# Patient Record
Sex: FEMALE | Race: WHITE | NOT HISPANIC OR LATINO | Employment: UNEMPLOYED | URBAN - METROPOLITAN AREA
[De-identification: names, ages, dates, MRNs, and addresses within clinical notes are randomized per-mention and may not be internally consistent; named-entity substitution may affect disease eponyms.]

---

## 2017-01-12 ENCOUNTER — OFFICE VISIT (OUTPATIENT)
Dept: FAMILY MEDICINE | Facility: CLINIC | Age: 67
End: 2017-01-12
Payer: COMMERCIAL

## 2017-01-12 VITALS
HEIGHT: 65 IN | DIASTOLIC BLOOD PRESSURE: 70 MMHG | WEIGHT: 158.5 LBS | SYSTOLIC BLOOD PRESSURE: 102 MMHG | BODY MASS INDEX: 26.41 KG/M2 | HEART RATE: 71 BPM | OXYGEN SATURATION: 98 % | TEMPERATURE: 97.9 F

## 2017-01-12 DIAGNOSIS — F41.0 ANXIETY ATTACK: ICD-10-CM

## 2017-01-12 DIAGNOSIS — R07.0 THROAT PAIN: ICD-10-CM

## 2017-01-12 DIAGNOSIS — E03.9 ACQUIRED HYPOTHYROIDISM: ICD-10-CM

## 2017-01-12 DIAGNOSIS — J01.00 ACUTE MAXILLARY SINUSITIS, RECURRENCE NOT SPECIFIED: Primary | ICD-10-CM

## 2017-01-12 LAB
DEPRECATED S PYO AG THROAT QL EIA: NORMAL
MICRO REPORT STATUS: NORMAL
SPECIMEN SOURCE: NORMAL

## 2017-01-12 PROCEDURE — 87081 CULTURE SCREEN ONLY: CPT | Performed by: FAMILY MEDICINE

## 2017-01-12 PROCEDURE — 87880 STREP A ASSAY W/OPTIC: CPT | Performed by: FAMILY MEDICINE

## 2017-01-12 PROCEDURE — 99213 OFFICE O/P EST LOW 20 MIN: CPT | Performed by: FAMILY MEDICINE

## 2017-01-12 RX ORDER — AZITHROMYCIN 250 MG/1
TABLET, FILM COATED ORAL
Qty: 6 TABLET | Refills: 0 | Status: SHIPPED | OUTPATIENT
Start: 2017-01-12 | End: 2017-04-05

## 2017-01-12 RX ORDER — LORAZEPAM 0.5 MG/1
TABLET ORAL
Qty: 20 TABLET | Refills: 0 | Status: SHIPPED | OUTPATIENT
Start: 2017-01-12 | End: 2017-05-02

## 2017-01-12 RX ORDER — LEVOTHYROXINE SODIUM 125 UG/1
TABLET ORAL
Qty: 90 TABLET | Refills: 1 | Status: SHIPPED | OUTPATIENT
Start: 2017-01-12 | End: 2018-01-26

## 2017-01-12 NOTE — PROGRESS NOTES
Subjective: She has had chronic problems with sinuses in the past which have mostly been better off gluten and sugars but with the stress of her recent finalized divorce and poor eating over the holidays she's had more trouble and 4 days ago she got a lot of sinus pressure and right ear pain and sore throat. Mucus is still mostly clear or thick. She is leaving in about a week to go to [facility]. She is worried about being sick there. She also asked for refill of the lorazepam, tends to use it for travel, hasn't filled it for 7 months. I notice she will need some more refills of her thyroid medication to get her till next June when she is due for a physical again.    Objective: ENT is nonspecific. Neck is normal. Lungs are clear. She does have a little pain over the right maxillary sinus    Assessment and plan: Sinusitis, relatively mild, could watch it for a few days and see if it gets better on its own and I wrote out a prescription for her to fill if things are not getting better in the next few days. I refilled the Ativan and sent another 6 months of thyroid.

## 2017-01-12 NOTE — NURSING NOTE
"Chief Complaint   Patient presents with     URI     sx for 4 days, right ear pain,  right side sinus pressure, sore throat      /70 mmHg  Pulse 71  Temp(Src) 97.9  F (36.6  C) (Oral)  Ht 5' 5.25\" (1.657 m)  Wt 158 lb 8 oz (71.895 kg)  BMI 26.19 kg/m2  SpO2 98%  LMP 09/14/2006 Estimated body mass index is 26.19 kg/(m^2) as calculated from the following:    Height as of this encounter: 5' 5.25\" (1.657 m).    Weight as of this encounter: 158 lb 8 oz (71.895 kg).  BP completed using cuff size: regular       Health Maintenance due pending provider review:  PHQ9    PHQ/ACT/KARINA--Gave pt questionnare      Tyrell Castañeda CMA  "

## 2017-01-13 ASSESSMENT — PATIENT HEALTH QUESTIONNAIRE - PHQ9: SUM OF ALL RESPONSES TO PHQ QUESTIONS 1-9: 4

## 2017-01-14 LAB
BACTERIA SPEC CULT: NORMAL
MICRO REPORT STATUS: NORMAL
SPECIMEN SOURCE: NORMAL

## 2017-02-20 ENCOUNTER — RADIANT APPOINTMENT (OUTPATIENT)
Dept: MAMMOGRAPHY | Facility: CLINIC | Age: 67
End: 2017-02-20

## 2017-02-20 ENCOUNTER — RECORDS - HEALTHEAST (OUTPATIENT)
Dept: ADMINISTRATIVE | Facility: OTHER | Age: 67
End: 2017-02-20

## 2017-02-20 DIAGNOSIS — Z12.31 VISIT FOR SCREENING MAMMOGRAM: ICD-10-CM

## 2017-02-21 ENCOUNTER — RECORDS - HEALTHEAST (OUTPATIENT)
Dept: ADMINISTRATIVE | Facility: OTHER | Age: 67
End: 2017-02-21

## 2017-04-05 ENCOUNTER — OFFICE VISIT (OUTPATIENT)
Dept: FAMILY MEDICINE | Facility: CLINIC | Age: 67
End: 2017-04-05
Payer: COMMERCIAL

## 2017-04-05 VITALS
OXYGEN SATURATION: 100 % | RESPIRATION RATE: 15 BRPM | SYSTOLIC BLOOD PRESSURE: 118 MMHG | BODY MASS INDEX: 25.49 KG/M2 | HEIGHT: 65 IN | WEIGHT: 153 LBS | DIASTOLIC BLOOD PRESSURE: 70 MMHG | HEART RATE: 72 BPM | TEMPERATURE: 98.3 F

## 2017-04-05 DIAGNOSIS — R30.0 DYSURIA: Primary | ICD-10-CM

## 2017-04-05 DIAGNOSIS — E03.9 ACQUIRED HYPOTHYROIDISM: ICD-10-CM

## 2017-04-05 LAB
ALBUMIN UR-MCNC: ABNORMAL MG/DL
APPEARANCE UR: CLEAR
BACTERIA #/AREA URNS HPF: ABNORMAL /HPF
BILIRUB UR QL STRIP: NEGATIVE
COLOR UR AUTO: YELLOW
GLUCOSE UR STRIP-MCNC: NEGATIVE MG/DL
HGB UR QL STRIP: ABNORMAL
KETONES UR STRIP-MCNC: NEGATIVE MG/DL
LEUKOCYTE ESTERASE UR QL STRIP: ABNORMAL
NITRATE UR QL: POSITIVE
NON-SQ EPI CELLS #/AREA URNS LPF: ABNORMAL /LPF
PH UR STRIP: 5.5 PH (ref 5–7)
RBC #/AREA URNS AUTO: ABNORMAL /HPF (ref 0–2)
SP GR UR STRIP: 1.01 (ref 1–1.03)
URN SPEC COLLECT METH UR: ABNORMAL
UROBILINOGEN UR STRIP-ACNC: 0.2 EU/DL (ref 0.2–1)
WBC #/AREA URNS AUTO: ABNORMAL /HPF (ref 0–2)

## 2017-04-05 PROCEDURE — 87186 SC STD MICRODIL/AGAR DIL: CPT | Performed by: FAMILY MEDICINE

## 2017-04-05 PROCEDURE — 81001 URINALYSIS AUTO W/SCOPE: CPT | Performed by: FAMILY MEDICINE

## 2017-04-05 PROCEDURE — 84443 ASSAY THYROID STIM HORMONE: CPT | Performed by: FAMILY MEDICINE

## 2017-04-05 PROCEDURE — 99214 OFFICE O/P EST MOD 30 MIN: CPT | Performed by: FAMILY MEDICINE

## 2017-04-05 PROCEDURE — 36415 COLL VENOUS BLD VENIPUNCTURE: CPT | Performed by: FAMILY MEDICINE

## 2017-04-05 PROCEDURE — 87088 URINE BACTERIA CULTURE: CPT | Performed by: FAMILY MEDICINE

## 2017-04-05 PROCEDURE — 87086 URINE CULTURE/COLONY COUNT: CPT | Performed by: FAMILY MEDICINE

## 2017-04-05 RX ORDER — CEPHALEXIN 500 MG/1
500 CAPSULE ORAL 3 TIMES DAILY
Qty: 9 CAPSULE | Refills: 0 | Status: SHIPPED | OUTPATIENT
Start: 2017-04-05 | End: 2017-04-08

## 2017-04-05 NOTE — MR AVS SNAPSHOT
"              After Visit Summary   4/5/2017    Adalgisa Dennis    MRN: [id number]           Patient Information     Date Of Birth          1950        Visit Information        Provider Department      4/5/2017 3:00 PM Daria Holt MD [facility]        Today's Diagnoses     Dysuria    -  1    Acquired hypothyroidism           Follow-ups after your visit        Who to contact     If you have questions or need follow up information about today's clinic visit or your schedule please contact [facility] directly at [phone].  Normal or non-critical lab and imaging results will be communicated to you by [employer], letter or phone within 4 business days after the clinic has received the results. If you do not hear from us within 7 days, please contact the clinic through [employer] or phone. If you have a critical or abnormal lab result, we will notify you by phone as soon as possible.  Submit refill requests through [employer] or call your pharmacy and they will forward the refill request to us. Please allow 3 business days for your refill to be completed.          Additional Information About Your Visit        MyChart Information     [employer] gives you secure access to your electronic health record. If you see a primary care provider, you can also send messages to your care team and make appointments. If you have questions, please call your primary care clinic.  If you do not have a primary care provider, please call [phone] and they will assist you.        Care EveryWhere ID     This is your Care EveryWhere ID. This could be used by other organizations to access your [facility] medical records  [id number]        Your Vitals Were     Pulse Temperature Respirations Height Last Period Pulse Oximetry    72 98.3  F (36.8  C) (Tympanic) 15 5' 5.25\" (1.657 m) 09/14/2006 100%    Breastfeeding? BMI (Body Mass Index)                No 25.27 kg/m2           Blood Pressure from Last 3 " Encounters:   04/05/17 118/70   01/12/17 102/70   06/02/16 118/62    Weight from Last 3 Encounters:   04/05/17 153 lb (69.4 kg)   01/12/17 158 lb 8 oz (71.9 kg)   06/02/16 156 lb 8 oz (71 kg)              We Performed the Following     TSH with free T4 reflex     UA with Microscopic reflex to Culture     Urine Culture Aerobic Bacterial          Today's Medication Changes          These changes are accurate as of: 4/5/17 11:59 PM.  If you have any questions, ask your nurse or doctor.               Start taking these medicines.        Dose/Directions    cephALEXin 500 MG capsule   Commonly known as:  KEFLEX   Used for:  Dysuria   Started by:  Daria Holt MD        Dose:  500 mg   Take 1 capsule (500 mg) by mouth 3 times daily for 3 days   Quantity:  9 capsule   Refills:  0            Where to get your medicines      These medications were sent to [facility] [address] [place] [address] AT [address] [address]     Phone:  [phone]     cephALEXin 500 MG capsule                Primary Care Provider Office Phone # Fax #    Panfilo Solares [phone]       [facility] [address] [address]        Thank you!     Thank you for choosing [facility]  for your care. Our goal is always to provide you with excellent care. Hearing back from our patients is one way we can continue to improve our services. Please take a few minutes to complete the written survey that you may receive in the mail after your visit with us. Thank you!             Your Updated Medication List - Protect others around you: Learn how to safely use, store and throw away your medicines at www.disposemymeds.org.          This list is accurate as of: 4/5/17 11:59 PM.  Always use your most recent med list.                   Brand Name Dispense Instructions for use    ascorbic acid 1000 MG Tabs    vitamin C     Take 1,000 mg by  mouth three times a week.       aspirin 81 MG tablet     100    1 tab po QD (Once per day)       CALCIUM 600 PO          cephALEXin 500 MG capsule    KEFLEX    9 capsule    Take 1 capsule (500 mg) by mouth 3 times daily for 3 days       HOMEOPATHIC PRODUCTS PO      Take  by mouth daily.       * levothyroxine 112 MCG tablet    SYNTHROID/LEVOTHROID    90 tablet    Take 1 tablet (112 mcg) by mouth daily Some days will take .125       * levothyroxine 125 MCG tablet    SYNTHROID/LEVOTHROID    90 tablet    TAKE 1 TABLET BY MOUTH 2 DAYS A WEEK       LORazepam 0.5 MG tablet    ATIVAN    20 tablet    Up to 1 daily as needed for anxiety       propranolol 20 MG tablet    INDERAL    540 tablet    TAKE 1-2 TABLETS BY MOUTH EVERY 8 HOURS       vitamin D 1000 UNITS capsule     30    1 CAPSULE DAILY       * Notice:  This list has 2 medication(s) that are the same as other medications prescribed for you. Read the directions carefully, and ask your doctor or other care provider to review them with you.

## 2017-04-05 NOTE — PROGRESS NOTES
"    SUBJECTIVE:                                                    Adalgisa Dennis is a 67 year old female who presents to clinic today for the following health issues:      URINARY TRACT SYMPTOMS      Duration: 1 day    Description  burning    Intensity:  moderate    Accompanying signs and symptoms:  Fever/chills: no   Flank pain no   Nausea and vomiting: no   Vaginal symptoms: none  Abdominal/Pelvic Pain: no     History  History of frequent UTI's: YES  History of kidney stones: no   Sexually Active: no   Possibility of pregnancy: No    Precipitating or alleviating factors: None    Therapies tried and outcome: Tylenol   Outcome: still having pain       PROBLEMS TO ADD ON...    Problem list and histories reviewed & adjusted, as indicated.  Additional history: as documented    Labs reviewed in EPIC    Reviewed and updated as needed this visit by clinical staff       Reviewed and updated as needed this visit by Provider         ROS:  Constitutional, HEENT, cardiovascular, pulmonary, GI, , musculoskeletal, neuro, skin, endocrine and psych systems are negative, except as otherwise noted.    OBJECTIVE:                                                    /70  Pulse 72  Temp 98.3  F (36.8  C) (Tympanic)  Resp 15  Ht 5' 5.25\" (1.657 m)  Wt 153 lb (69.4 kg)  LMP 09/14/2006  SpO2 100%  Breastfeeding? No  BMI 25.27 kg/m2  Body mass index is 25.27 kg/(m^2).  GENERAL: healthy, alert and no distress  NECK: no adenopathy, no asymmetry, masses, or scars and thyroid normal to palpation  RESP: lungs clear to auscultation - no rales, rhonchi or wheezes  CV: regular rate and rhythm, normal S1 S2, no S3 or S4, no murmur, click or rub, no peripheral edema and peripheral pulses strong  ABDOMEN: soft, nontender, no hepatosplenomegaly, no masses and bowel sounds normal  MS: no gross musculoskeletal defects noted, no edema          ASSESSMENT/PLAN:                                                    (R30.0) Dysuria  " (primary encounter diagnosis)  Plan: UA with Microscopic reflex to Culture, Urine Culture Aerobic Bacterial, encouraged hydration   start cephALEXin (KEFLEX)  500 MG capsule twice daily for 3 days   Potential medication side effects were discussed with the patient; let me know if any occur.  Follow up as needed  If not better      (E03.9) Acquired hypothyroidism  Comment: takes 125mcg M,F  twice a week and 112 rest of the week  Plan: TSH with free T4 reflex  Will refill if needed and TSH in range        The patient indicates understanding of these issues and agrees with the plan.     Daria Holt MD  [facility]

## 2017-04-06 LAB — TSH SERPL DL<=0.005 MIU/L-ACNC: 3.21 MU/L (ref 0.4–4)

## 2017-04-07 LAB
BACTERIA SPEC CULT: ABNORMAL
MICRO REPORT STATUS: ABNORMAL
MICROORGANISM SPEC CULT: ABNORMAL
SPECIMEN SOURCE: ABNORMAL

## 2017-04-08 NOTE — PROGRESS NOTES
-TSH (thyroid stimulating hormone) level is normal which indicates appropriate thyroid replacement dosing.  ADVISE: continuing same replacement dose and recheck in 12 months (TSH w/ T4 reflex, DX: hypothyroidism.)    Please keep us posted if need refill    The urine culture is positive for bacterial that's typical for urinary tract infection    Antibiotic should help. If not please keep us posted

## 2017-05-02 DIAGNOSIS — F41.0 ANXIETY ATTACK: ICD-10-CM

## 2017-05-02 DIAGNOSIS — E03.8 OTHER SPECIFIED HYPOTHYROIDISM: ICD-10-CM

## 2017-05-03 RX ORDER — LEVOTHYROXINE SODIUM 112 UG/1
TABLET ORAL
Qty: 65 TABLET | Refills: 2 | Status: SHIPPED | OUTPATIENT
Start: 2017-05-03 | End: 2017-06-27

## 2017-05-03 RX ORDER — LORAZEPAM 0.5 MG/1
TABLET ORAL
Qty: 20 TABLET | Refills: 0 | Status: SHIPPED | OUTPATIENT
Start: 2017-05-03 | End: 2017-06-27

## 2017-05-03 NOTE — TELEPHONE ENCOUNTER
MP,    Controlled Substance Refill Request for Ativan    Last refill: [facility] website not working this AM    Last clinic visit: 1/12/2017    Clinic visit frequency required: not documented  Next appt: none    Controlled substance agreement on file: No.    Documentation in problem list reviewed:  started, needs to be completed    Processing:  F[facility] Rx to [facility]    RX monitoring program ([place]) reviewed: [facility] website not working this AM  [place]:  [email or web address]    Please authorize if appropriate.  Thanks,  Ana ONEAL RN

## 2017-05-03 NOTE — TELEPHONE ENCOUNTER
Prescription approved per [facility] Refill Protocol.  Ana ONEAL RN

## 2017-05-03 NOTE — TELEPHONE ENCOUNTER
Pending Prescriptions:                       Disp   Refills    levothyroxine (SYNTHROID/LEVOTHROID) 112 *90 tab*2            Sig: TAKE 1 TABLET BY MOUTH ONCE DAILY, 5 DAYS A WEEK       Last Written Prescription Date: 12/15/2015  Last Quantity: 90, # refills: PRN  Last Office Visit with [facility], [facility] or [facility] prescribing provider: 4/5/2017        TSH   Date Value Ref Range Status   04/05/2017 3.21 0.40 - 4.00 mU/L Final

## 2017-05-03 NOTE — TELEPHONE ENCOUNTER
Pending Prescriptions:                       Disp   Refills    LORazepam (ATIVAN) 0.5 MG tablet [Pharmac*20 tab*0            Sig: TAKE 1 TABLET BY MOUTH AS NEEDED FOR ANXIETY        Last Written Prescription Date:  1/12/2017  Last Fill Quantity: 20,   # refills: 0  Last Office Visit with [facility], [facility] or [facility] prescribing provider: 4/5/2017  Future Office visit:       Routing refill request to provider for review/approval because:  Drug not on the [facility], [facility] or [facility] refill protocol or controlled substance

## 2017-05-12 ENCOUNTER — TRANSFERRED RECORDS (OUTPATIENT)
Dept: HEALTH INFORMATION MANAGEMENT | Facility: CLINIC | Age: 67
End: 2017-05-12

## 2017-05-23 ENCOUNTER — COMMUNICATION - HEALTHEAST (OUTPATIENT)
Dept: SCHEDULING | Facility: CLINIC | Age: 67
End: 2017-05-23

## 2017-06-27 ENCOUNTER — OFFICE VISIT (OUTPATIENT)
Dept: FAMILY MEDICINE | Facility: CLINIC | Age: 67
End: 2017-06-27
Payer: COMMERCIAL

## 2017-06-27 VITALS
SYSTOLIC BLOOD PRESSURE: 97 MMHG | TEMPERATURE: 96.2 F | OXYGEN SATURATION: 97 % | BODY MASS INDEX: 25.69 KG/M2 | DIASTOLIC BLOOD PRESSURE: 51 MMHG | HEART RATE: 63 BPM | WEIGHT: 154.2 LBS | HEIGHT: 65 IN

## 2017-06-27 DIAGNOSIS — Z23 NEED FOR VACCINATION: ICD-10-CM

## 2017-06-27 DIAGNOSIS — R82.90 NONSPECIFIC FINDING ON EXAMINATION OF URINE: ICD-10-CM

## 2017-06-27 DIAGNOSIS — F40.243 FLYING PHOBIA: ICD-10-CM

## 2017-06-27 DIAGNOSIS — F41.0 ANXIETY ATTACK: ICD-10-CM

## 2017-06-27 DIAGNOSIS — Z00.00 MEDICARE ANNUAL WELLNESS VISIT, SUBSEQUENT: Primary | ICD-10-CM

## 2017-06-27 DIAGNOSIS — D69.6 THROMBOCYTOPENIA (H): ICD-10-CM

## 2017-06-27 DIAGNOSIS — E03.8 OTHER SPECIFIED HYPOTHYROIDISM: ICD-10-CM

## 2017-06-27 DIAGNOSIS — I48.0 PAROXYSMAL ATRIAL FIBRILLATION (H): ICD-10-CM

## 2017-06-27 DIAGNOSIS — R30.0 DYSURIA: ICD-10-CM

## 2017-06-27 LAB
ALBUMIN UR-MCNC: NEGATIVE MG/DL
APPEARANCE UR: CLEAR
BILIRUB UR QL STRIP: NEGATIVE
COLOR UR AUTO: YELLOW
GLUCOSE UR STRIP-MCNC: NEGATIVE MG/DL
HGB UR QL STRIP: ABNORMAL
KETONES UR STRIP-MCNC: NEGATIVE MG/DL
LEUKOCYTE ESTERASE UR QL STRIP: ABNORMAL
NITRATE UR QL: NEGATIVE
NON-SQ EPI CELLS #/AREA URNS LPF: ABNORMAL /LPF
PH UR STRIP: 5.5 PH (ref 5–7)
RBC #/AREA URNS AUTO: ABNORMAL /HPF (ref 0–2)
SP GR UR STRIP: <=1.005 (ref 1–1.03)
URN SPEC COLLECT METH UR: ABNORMAL
UROBILINOGEN UR STRIP-ACNC: 0.2 EU/DL (ref 0.2–1)
WBC #/AREA URNS AUTO: ABNORMAL /HPF (ref 0–2)

## 2017-06-27 PROCEDURE — 81001 URINALYSIS AUTO W/SCOPE: CPT | Performed by: FAMILY MEDICINE

## 2017-06-27 PROCEDURE — 99213 OFFICE O/P EST LOW 20 MIN: CPT | Mod: 25 | Performed by: FAMILY MEDICINE

## 2017-06-27 PROCEDURE — G0438 PPPS, INITIAL VISIT: HCPCS | Performed by: FAMILY MEDICINE

## 2017-06-27 PROCEDURE — 87086 URINE CULTURE/COLONY COUNT: CPT | Performed by: FAMILY MEDICINE

## 2017-06-27 RX ORDER — NITROFURANTOIN 25; 75 MG/1; MG/1
100 CAPSULE ORAL 2 TIMES DAILY
Qty: 14 CAPSULE | Refills: 0 | Status: SHIPPED | OUTPATIENT
Start: 2017-06-27 | End: 2021-09-02

## 2017-06-27 RX ORDER — LEVOTHYROXINE SODIUM 112 UG/1
TABLET ORAL
Qty: 65 TABLET | Refills: 2 | Status: SHIPPED | OUTPATIENT
Start: 2017-06-27 | End: 2018-04-16

## 2017-06-27 RX ORDER — PROPRANOLOL HYDROCHLORIDE 20 MG/1
20 TABLET ORAL
Qty: 50 TABLET | Refills: 1 | Status: SHIPPED | OUTPATIENT
Start: 2017-06-27 | End: 2017-08-18

## 2017-06-27 RX ORDER — LORAZEPAM 0.5 MG/1
0.5 TABLET ORAL PRN
Qty: 20 TABLET | Refills: 0 | Status: SHIPPED | OUTPATIENT
Start: 2017-06-27 | End: 2017-11-27

## 2017-06-27 NOTE — NURSING NOTE
"Chief Complaint   Patient presents with     Wellness Visit       Initial BP 97/51  Pulse 63  Temp 96.2  F (35.7  C) (Oral)  Ht 5' 5.25\" (1.657 m)  Wt 154 lb 3.2 oz (69.9 kg)  LMP 09/14/2006  SpO2 97%  BMI 25.46 kg/m2 Estimated body mass index is 25.46 kg/(m^2) as calculated from the following:    Height as of this encounter: 5' 5.25\" (1.657 m).    Weight as of this encounter: 154 lb 3.2 oz (69.9 kg).  Medication Reconciliation: complete      Health Maintenance that is potentially due pending provider review:  NONE    n/a    EDITH Rodriguez  "

## 2017-06-27 NOTE — PROGRESS NOTES
SUBJECTIVE:   Adalgsia Dennis is a 67 year old female who presents for Preventive Visit.    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 She takes levothyroxine 112 mcg, 5 days a week and 125 mcg twice a week. She has refill on 125 mcg but needs more refill on 112 mcg.  She reports major life changes in past 6 months, finalized divorce, moved a mile away and step mom passed on, all in a span of past 6 months, so has been having a lot of grief but doing a lot better with grief and depression     She reports she has never done well on antidepressant, and has done better with counseling and homeopathy- see a specialist gets some prescribed medications based on symptoms based treatment  She got something today for bladder infection-camtheris, she took one tab, and not sure if it has helped yet or not  She has Paroxysms of afib and uses propranolol as needed - needs it once or twice a month, she reports dehydration, chocolate and coffee are often the triggers, so she limits them as much as she can. She does need refill on them as well  She states she feels that she has a lesions that she feels on left side of the vagina- it  comes and goes.She states she also has chronic genital HSV that comes and goes as well    Are you in the first 12 months of your Medicare Part B coverage?  No    Healthy Habits:    Do you get at least three servings of calcium containing foods daily (dairy, green leafy vegetables, etc.)? no, taking calcium and/or vitamin D supplement: yes     Amount of exercise or daily  activities, outside of work: 3 day(s) per week    Problems taking medications regularly No    Medication side effects: No    Have you had an eye exam in the past two years? yes    Do you see a dentist twice per year? yes    Do you have sleep apnea, excessive snoring or daytime drowsiness?no    COGNITIVE SCREEN  1) Repeat 3 items (Banana, Sunrise, Chair)    2) Clock draw: NORMAL  3) 3 item recall: Recalls 3 objects  Results: 3 items recalled: COGNITIVE IMPAIRMENT LESS LIKELY    Mini-CogTM Copyright S Drew. Licensed by the author for use in [facility]; reprinted with permission (ash@[facility]). All rights reserved.      Patient is having pain with urination.           Reviewed and updated as needed this visit by clinical staff  Tobacco  Allergies  Meds         Reviewed and updated as needed this visit by Provider        Social History   Substance Use Topics     Smoking status: Never Smoker     Smokeless tobacco: Never Used     Alcohol use 0.0 oz/week     0 Standard drinks or equivalent per week      Comment: Social ETOH       The patient does not drink >3 drinks per day nor >7 drinks per week.    Today's PHQ-2 Score:   PHQ-2 ( 1999 [employer]) 6/27/2017 5/24/2016   Q1: Little interest or pleasure in doing things 0 1   Q2: Feeling down, depressed or hopeless 0 0   PHQ-2 Score 0 1       Do you feel safe in your environment - Yes    Do you have a Health Care Directive?: No: Advance care planning reviewed with patient; information given to patient to review.    Current providers sharing in care for this patient include:   Patient Care Team:  Panfilo Solares MD as PCP - General (Family Practice)      Hearing impairment: No    Ability to successfully perform activities of daily living: Yes, no assistance needed     Fall risk:  Fallen 2 or more times in the past year?: No  Any fall with injury in the past year?: No    Home safety:  none identified      The following health maintenance items are reviewed in  "Epic and correct as of today:  Health Maintenance   Topic Date Due     PNEUMOCOCCAL (1 of 2 - PCV13) 03/09/2016     ADVANCE DIRECTIVE PLANNING Q5 YRS  06/21/2017     FALL RISK ASSESSMENT  06/02/2017     PHQ-9 Q6 MONTHS  07/12/2017     INFLUENZA VACCINE (SYSTEM ASSIGNED)  09/01/2017     DEPRESSION ACTION PLAN Q1 YR  01/12/2018     TSH Q1 YEAR  04/05/2018     MAMMO SCREEN Q2 YR (SYSTEM ASSIGNED)  02/20/2019     TETANUS IMMUNIZATION (SYSTEM ASSIGNED)  11/15/2020     LIPID SCREEN Q5 YR FEMALE (SYSTEM ASSIGNED)  12/17/2020     COLONOSCOPY Q10 YR  04/09/2025     DEXA SCAN SCREENING (SYSTEM ASSIGNED)  Completed     HEPATITIS C SCREENING  Completed     Labs reviewed in EPIC    Vaccinations are not Up to date      ROS:  Constitutional, HEENT, cardiovascular, pulmonary, GI, , musculoskeletal, neuro, skin, endocrine and psych systems are negative, except as otherwise noted.    OBJECTIVE:   BP 97/51  Pulse 63  Temp 96.2  F (35.7  C) (Oral)  Ht 5' 5.25\" (1.657 m)  Wt 154 lb 3.2 oz (69.9 kg)  LMP 09/14/2006  SpO2 97%  BMI 25.46 kg/m2 Estimated body mass index is 25.46 kg/(m^2) as calculated from the following:    Height as of this encounter: 5' 5.25\" (1.657 m).    Weight as of this encounter: 154 lb 3.2 oz (69.9 kg).  EXAM:   GENERAL: healthy, alert and no distress  EYES: Eyes grossly normal to inspection, PERRL and conjunctivae and sclerae normal  HENT: ear canals and TM's normal, nose and mouth without ulcers or lesions  NECK: no adenopathy, no asymmetry, masses, or scars and thyroid normal to palpation  RESP: lungs clear to auscultation - no rales, rhonchi or wheezes  BREAST: normal without masses, tenderness or nipple discharge and no palpable axillary masses or adenopathy  CV: regular rate and rhythm, normal S1 S2, no S3 or S4, no murmur, click or rub, no peripheral edema and peripheral pulses strong  ABDOMEN: soft, nontender, no hepatosplenomegaly, no masses and bowel sounds normal   (female): normal female " external genitalia, normal urethral meatus, vaginal mucosa pink, moist, well rugated, and normal cervix/adnexa/uterus without masses or discharge  MS: no gross musculoskeletal defects noted, no edema  SKIN: no suspicious lesions or rashes  NEURO: Normal strength and tone, mentation intact and speech normal  PSYCH: mentation appears normal, affect normal/bright. Patient deffered generalized anxiety disorder questions  KARINA-7 SCORE 5/24/2016   Total Score 3     PHQ-9 SCORE 12/17/2015 5/24/2016 1/12/2017   Total Score - - -   Total Score 4 5 4     ASSESSMENT / PLAN:   (Z00.00) Medicare annual wellness visit, subsequent  (primary encounter diagnosis)  Plan:     (I48.0) Paroxysmal atrial fibrillation (H)  Plan: propranolol (INDERAL) 20 MG tablet as needed .      (E03.8) Other specified hypothyroidism  Plan: levothyroxine (SYNTHROID/LEVOTHROID) 125 mcg Monday and Friday week and  112 MCG  5 days a week. Most recent TSH in range        tablet       (D69.6) Thrombocytopenia (H)  Plan: no active bleeding.chronic in nature. 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 She is Under care of - i dont know the interaction of supplemenet with other medications and she is advised caution. lorazepam only as needed- not intended for long term treatment   Plan: LORazepam (ATIVAN) 0.5 MG tablet,  As needed  Only # 20 given  No need for controlled substance contract. [place] concerns [place]    (R30.0) Dysuria  Plan: signs of urinary tract infection- advised antibiotic and improved hydration. She wants printed antibiotic so she can discuss it further with her homeopathy doctors  UA with Microscopic,  Urine Culture Aerobic Bacterial  nitrofurantoin, macrocrystal-monohydrate, (MACROBID) 100 MG  Twice daily for 7 days       Situational stress- she reports she is over all doing better      (Z23) Need for  "vaccination  Comment: discussed Prevnar and zostavax- patient declined  Plan:     In addition to the preventive visit, 15  minutes of the appointment were spent evaluating and developing a treatment plan for she additional concern(s) as above.        End of Life Planning:  Patient currently has an advanced directive: Yes.  Practitioner is supportive of decision.    COUNSELING:  Reviewed preventive health counseling, as reflected in patient instructions       Regular exercise       Healthy diet/nutrition    BP Screening:   Last 3 BP Readings:    BP Readings from Last 3 Encounters:   06/27/17 97/51   04/05/17 118/70   01/12/17 102/70       The following was recommended to the patient:  Re-screen BP within a year and recommended lifestyle modifications    Estimated body mass index is 25.46 kg/(m^2) as calculated from the following:    Height as of this encounter: 5' 5.25\" (1.657 m).    Weight as of this encounter: 154 lb 3.2 oz (69.9 kg).     reports that she has never smoked. She has never used smokeless tobacco.      Appropriate preventive services were discussed with this patient, including applicable screening as appropriate for cardiovascular disease, diabetes, osteopenia/osteoporosis, and glaucoma.  As appropriate for age/gender, discussed screening for colorectal cancer, prostate cancer, breast cancer, and cervical cancer. Checklist reviewing preventive services available has been given to the patient.    Reviewed patients plan of care and provided an AVS. The Basic Care Plan (routine screening as documented in Health Maintenance) for Adalgisa meets the Care Plan requirement. This Care Plan has been established and reviewed with the Patient.    Counseling Resources:  ATP IV Guidelines  Pooled Cohorts Equation Calculator  Breast Cancer Risk Calculator  FRAX Risk Assessment  ICSI Preventive Guidelines  Dietary Guidelines for Americans, 2010  USDA's MyPlate  ASA Prophylaxis  Lung CA Screening    Daria Blakely " MD Jonathon  [facility]

## 2017-06-27 NOTE — MR AVS SNAPSHOT
After Visit Summary   6/27/2017    Adalgisa Dennis    MRN: [id number]           Patient Information     Date Of Birth          1950        Visit Information        Provider Department      6/27/2017 2:00 PM Daria Holt MD [facility]        Today's Diagnoses     Medicare annual wellness visit, subsequent    -  1    Paroxysmal atrial fibrillation (H)        Other specified hypothyroidism        Thrombocytopenia (H)        Anxiety attack        Flying phobia        Dysuria        Need for vaccination        Nonspecific finding on examination of urine          Care Instructions      Preventive Health Recommendations    Female Ages 65 +    Yearly exam:     See your health care provider every year in order to  o Review health changes.   o Discuss preventive care.    o Review your medicines if your doctor has prescribed any.      You no longer need a yearly Pap test unless you've had an abnormal Pap test in the past 10 years. If you have vaginal symptoms, such as bleeding or discharge, be sure to talk with your provider about a Pap test.      Every 1 to 2 years, have a mammogram.  If you are over 69, talk with your health care provider about whether or not you want to continue having screening mammograms.      Every 10 years, have a colonoscopy. Or, have a yearly FIT test (stool test). These exams will check for colon cancer.       Have a cholesterol test every 5 years, or more often if your doctor advises it.       Have a diabetes test (fasting glucose) every three years. If you are at risk for diabetes, you should have this test more often.       At age 65, have a bone density scan (DEXA) to check for osteoporosis (brittle bone disease).    Shots:    Get a flu shot each year.    Get a tetanus shot every 10 years.    Talk to your doctor about your pneumonia vaccines. There are now two you should receive - Pneumovax (PPSV 23) and Prevnar (PCV 13).    Talk to your doctor about the  shingles vaccine.    Talk to your doctor about the hepatitis B vaccine.    Nutrition:     Eat at least 5 servings of fruits and vegetables each day.      Eat whole-grain bread, whole-wheat pasta and brown rice instead of white grains and rice.      Talk to your provider about Calcium and Vitamin D.     Lifestyle    Exercise at least 150 minutes a week (30 minutes a day, 5 days a week). This will help you control your weight and prevent disease.      Limit alcohol to one drink per day.      No smoking.       Wear sunscreen to prevent skin cancer.       See your dentist twice a year for an exam and cleaning.      See your eye doctor every 1 to 2 years to screen for conditions such as glaucoma, macular degeneration and cataracts.          Follow-ups after your visit        Who to contact     If you have questions or need follow up information about today's clinic visit or your schedule please contact [facility] directly at [phone].  Normal or non-critical lab and imaging results will be communicated to you by MyChart, letter or phone within 4 business days after the clinic has received the results. If you do not hear from us within 7 days, please contact the clinic through CloudRunner I/Ot or phone. If you have a critical or abnormal lab result, we will notify you by phone as soon as possible.  Submit refill requests through Zet Universe or call your pharmacy and they will forward the refill request to us. Please allow 3 business days for your refill to be completed.          Additional Information About Your Visit        TufinharSoundtracker Information     [employer] Universe gives you secure access to your electronic health record. If you see a primary care provider, you can also send messages to your care team and make appointments. If you have questions, please call your primary care clinic.  If you do not have a primary care provider, please call [phone] and they will assist you.        Care EveryWhere ID     This is your Care  "EveryWhere ID. This could be used by other organizations to access your [place] medical records  [id number]        Your Vitals Were     Pulse Temperature Height Last Period Pulse Oximetry BMI (Body Mass Index)    63 96.2  F (35.7  C) (Oral) 5' 5.25\" (1.657 m) 09/14/2006 97% 25.46 kg/m2       Blood Pressure from Last 3 Encounters:   06/27/17 97/51   04/05/17 118/70   01/12/17 102/70    Weight from Last 3 Encounters:   06/27/17 154 lb 3.2 oz (69.9 kg)   04/05/17 153 lb (69.4 kg)   01/12/17 158 lb 8 oz (71.9 kg)              We Performed the Following     OFFICE/OUTPT VISIT,EST,LEVL III     UA with Microscopic     Urine Culture Aerobic Bacterial          Today's Medication Changes          These changes are accurate as of: 6/27/17 11:59 PM.  If you have any questions, ask your nurse or doctor.               Start taking these medicines.        Dose/Directions    nitroFURantoin (macrocrystal-monohydrate) 100 MG capsule   Commonly known as:  MACROBID   Used for:  Dysuria   Started by:  Daria Holt MD        Dose:  100 mg   Take 1 capsule (100 mg) by mouth 2 times daily   Quantity:  14 capsule   Refills:  0         These medicines have changed or have updated prescriptions.        Dose/Directions    LORazepam 0.5 MG tablet   Commonly known as:  ATIVAN   This may have changed:  See the new instructions.   Used for:  Anxiety attack, Flying phobia   Changed by:  Daria Holt MD        Dose:  0.5 mg   Take 1 tablet (0.5 mg) by mouth as needed for anxiety   Quantity:  20 tablet   Refills:  0       propranolol 20 MG tablet   Commonly known as:  INDERAL   This may have changed:  See the new instructions.   Used for:  Paroxysmal atrial fibrillation (H)   Changed by:  Daria Holt MD        Dose:  20 mg   Take 1 tablet (20 mg) by mouth once as needed   Quantity:  50 tablet   Refills:  1            Where to get your medicines      These medications were sent to [facility], [place] - [address]  [address] [facility] [address]     Phone:  [phone]     levothyroxine 112 MCG tablet    propranolol 20 MG tablet         Some of these will need a paper prescription and others can be bought over the counter.  Ask your nurse if you have questions.     Bring a paper prescription for each of these medications     LORazepam 0.5 MG tablet    nitroFURantoin (macrocrystal-monohydrate) 100 MG capsule                Primary Care Provider Office Phone # Fax #    Panfilo Solares [phone]       [facility] [address] [facility]  [address]  [facility] [address]        Equal Access to Services     [place] : Hadii leora Guillen, waaxda shala, qaybta kaalmada maria isabel, deja ballesteros . So [facility] [phone].    ATENCIÓN: Si habla español, tiene a cervantes disposición servicios gratuitos de asistencia lingüística. Llame al [phone].    We comply with applicable federal civil rights laws and [place] laws. We do not discriminate on the basis of race, color, national origin, age, disability sex, sexual orientation or gender identity.            Thank you!     Thank you for choosing [facility]  for your care. Our goal is always to provide you with excellent care. Hearing back from our patients is one way we can continue to improve our services. Please take a few minutes to complete the written survey that you may receive in the mail after your visit with us. Thank you!             Your Updated Medication List - Protect others around you: Learn how to safely use, store and throw away your medicines at www.disposemymeds.org.          This list is accurate as of: 6/27/17 11:59 PM.  Always use your most recent med list.                   Brand Name Dispense Instructions for use Diagnosis    ascorbic acid 1000 MG Tabs    vitamin C     Take 1,000 mg by mouth three times a week.        aspirin 81 MG tablet     100    1 tab po QD  (Once per day)        CALCIUM 600 PO           HOMEOPATHIC PRODUCTS PO      Take  by mouth daily.        * levothyroxine 125 MCG tablet    SYNTHROID/LEVOTHROID    90 tablet    TAKE 1 TABLET BY MOUTH 2 DAYS A WEEK    Acquired hypothyroidism       * levothyroxine 112 MCG tablet    SYNTHROID/LEVOTHROID    65 tablet    Take 1 tablet 5 days a week    Other specified hypothyroidism       LORazepam 0.5 MG tablet    ATIVAN    20 tablet    Take 1 tablet (0.5 mg) by mouth as needed for anxiety    Anxiety attack, Flying phobia       nitroFURantoin (macrocrystal-monohydrate) 100 MG capsule    MACROBID    14 capsule    Take 1 capsule (100 mg) by mouth 2 times daily    Dysuria       propranolol 20 MG tablet    INDERAL    50 tablet    Take 1 tablet (20 mg) by mouth once as needed    Paroxysmal atrial fibrillation (H)       vitamin D 1000 UNITS capsule     30    1 CAPSULE DAILY        * Notice:  This list has 2 medication(s) that are the same as other medications prescribed for you. Read the directions carefully, and ask your doctor or other care provider to review them with you.

## 2017-06-28 LAB
BACTERIA SPEC CULT: NORMAL
MICRO REPORT STATUS: NORMAL
SPECIMEN SOURCE: NORMAL

## 2017-06-28 NOTE — PROGRESS NOTES
Mixed bacterial growth  If antibiotic helping no further action is required  If you never started antibiotic and no urinary symptoms - than no need to start antibiotics either

## 2017-08-01 ENCOUNTER — COMMUNICATION - HEALTHEAST (OUTPATIENT)
Dept: SCHEDULING | Facility: CLINIC | Age: 67
End: 2017-08-01

## 2017-08-03 ENCOUNTER — COMMUNICATION - HEALTHEAST (OUTPATIENT)
Dept: FAMILY MEDICINE | Facility: CLINIC | Age: 67
End: 2017-08-03

## 2017-08-05 ENCOUNTER — OFFICE VISIT (OUTPATIENT)
Dept: URGENT CARE | Facility: URGENT CARE | Age: 67
End: 2017-08-05
Payer: COMMERCIAL

## 2017-08-05 ENCOUNTER — RADIANT APPOINTMENT (OUTPATIENT)
Dept: GENERAL RADIOLOGY | Facility: CLINIC | Age: 67
End: 2017-08-05
Attending: INTERNAL MEDICINE
Payer: COMMERCIAL

## 2017-08-05 VITALS
SYSTOLIC BLOOD PRESSURE: 104 MMHG | WEIGHT: 156 LBS | HEIGHT: 65 IN | OXYGEN SATURATION: 99 % | TEMPERATURE: 97.8 F | HEART RATE: 56 BPM | BODY MASS INDEX: 25.99 KG/M2 | DIASTOLIC BLOOD PRESSURE: 66 MMHG

## 2017-08-05 DIAGNOSIS — R53.83 FATIGUE, UNSPECIFIED TYPE: ICD-10-CM

## 2017-08-05 DIAGNOSIS — R06.02 SOB (SHORTNESS OF BREATH): ICD-10-CM

## 2017-08-05 DIAGNOSIS — R09.81 SINUS CONGESTION: ICD-10-CM

## 2017-08-05 DIAGNOSIS — R53.83 FATIGUE, UNSPECIFIED TYPE: Primary | ICD-10-CM

## 2017-08-05 DIAGNOSIS — D69.6 THROMBOCYTOPENIA (H): ICD-10-CM

## 2017-08-05 LAB
BASOPHILS # BLD AUTO: 0.1 10E9/L (ref 0–0.2)
BASOPHILS NFR BLD AUTO: 0.7 %
DIFFERENTIAL METHOD BLD: ABNORMAL
EOSINOPHIL # BLD AUTO: 0.2 10E9/L (ref 0–0.7)
EOSINOPHIL NFR BLD AUTO: 2 %
ERYTHROCYTE [DISTWIDTH] IN BLOOD BY AUTOMATED COUNT: 12.8 % (ref 10–15)
HCT VFR BLD AUTO: 41.8 % (ref 35–47)
HGB BLD-MCNC: 13.8 G/DL (ref 11.7–15.7)
LYMPHOCYTES # BLD AUTO: 3.2 10E9/L (ref 0.8–5.3)
LYMPHOCYTES NFR BLD AUTO: 35 %
MCH RBC QN AUTO: 31.4 PG (ref 26.5–33)
MCHC RBC AUTO-ENTMCNC: 33 G/DL (ref 31.5–36.5)
MCV RBC AUTO: 95 FL (ref 78–100)
MONOCYTES # BLD AUTO: 0.8 10E9/L (ref 0–1.3)
MONOCYTES NFR BLD AUTO: 8.7 %
NEUTROPHILS # BLD AUTO: 4.9 10E9/L (ref 1.6–8.3)
NEUTROPHILS NFR BLD AUTO: 53.6 %
PLATELET # BLD AUTO: 82 10E9/L (ref 150–450)
RBC # BLD AUTO: 4.4 10E12/L (ref 3.8–5.2)
TSH SERPL DL<=0.005 MIU/L-ACNC: 2.45 MU/L (ref 0.4–4)
WBC # BLD AUTO: 9.1 10E9/L (ref 4–11)

## 2017-08-05 PROCEDURE — 84443 ASSAY THYROID STIM HORMONE: CPT | Performed by: INTERNAL MEDICINE

## 2017-08-05 PROCEDURE — 71020 XR CHEST 2 VW: CPT

## 2017-08-05 PROCEDURE — 85025 COMPLETE CBC W/AUTO DIFF WBC: CPT | Performed by: INTERNAL MEDICINE

## 2017-08-05 PROCEDURE — 99214 OFFICE O/P EST MOD 30 MIN: CPT | Performed by: INTERNAL MEDICINE

## 2017-08-05 PROCEDURE — 36415 COLL VENOUS BLD VENIPUNCTURE: CPT | Performed by: INTERNAL MEDICINE

## 2017-08-05 RX ORDER — FLUTICASONE PROPIONATE 50 MCG
1-2 SPRAY, SUSPENSION (ML) NASAL DAILY
Qty: 1 BOTTLE | Refills: 0 | Status: SHIPPED | OUTPATIENT
Start: 2017-08-05 | End: 2019-09-15

## 2017-08-05 NOTE — MR AVS SNAPSHOT
After Visit Summary   8/5/2017    Adalgisa Dennis    MRN: [id number]           Patient Information     Date Of Birth          1950        Visit Information        Provider Department      8/5/2017 11:00 AM Sanna Magana MD [facility]        Today's Diagnoses     Fatigue, unspecified type    -  1    Sinus congestion        SOB (shortness of breath)        Thrombocytopenia (H)          Care Instructions    Return for chest x-ray in 1 hour when technician arrives    Blood test for blood counts and thyroid    flonase for sinus congestion  Start oral antibiotics if flonase not improving symptoms     Schedule appointment for recheck at primary later this coming week    Platelet count 75.  Please recheck at next visit  White count & hemoglobin are normal           Follow-ups after your visit        Future tests that were ordered for you today     Open Future Orders        Priority Expected Expires Ordered    XR Chest 2 Views Routine 8/5/2017 8/5/2018 8/5/2017            Who to contact     If you have questions or need follow up information about today's clinic visit or your schedule please contact [facility] directly at [phone].  Normal or non-critical lab and imaging results will be communicated to you by Trony Solarhart, letter or phone within 4 business days after the clinic has received the results. If you do not hear from us within 7 days, please contact the clinic through Trxade Groupt or phone. If you have a critical or abnormal lab result, we will notify you by phone as soon as possible.  Submit refill requests through BioMers or call your pharmacy and they will forward the refill request to us. Please allow 3 business days for your refill to be completed.          Additional Information About Your Visit        Trony Solarhart Information     [employer] gives you secure access to your electronic health record. If you see a primary care provider, you can  "also send messages to your care team and make appointments. If you have questions, please call your primary care clinic.  If you do not have a primary care provider, please call [phone] and they will assist you.        Care EveryWhere ID     This is your Care EveryWhere ID. This could be used by other organizations to access your [place] medical records  [id number]        Your Vitals Were     Pulse Temperature Height Last Period Pulse Oximetry BMI (Body Mass Index)    56 97.8  F (36.6  C) (Tympanic) 5' 5\" (1.651 m) 09/14/2006 99% 25.96 kg/m2       Blood Pressure from Last 3 Encounters:   08/05/17 104/66   06/27/17 97/51   04/05/17 118/70    Weight from Last 3 Encounters:   08/05/17 156 lb (70.8 kg)   06/27/17 154 lb 3.2 oz (69.9 kg)   04/05/17 153 lb (69.4 kg)              We Performed the Following     CBC with platelets and differential     TSH with free T4 reflex          Today's Medication Changes          These changes are accurate as of: 8/5/17 12:12 PM.  If you have any questions, ask your nurse or doctor.               Start taking these medicines.        Dose/Directions    amoxicillin-clavulanate 875-125 MG per tablet   Commonly known as:  AUGMENTIN   Used for:  Sinus congestion   Started by:  Sanna Magana MD        Dose:  1 tablet   Take 1 tablet by mouth 2 times daily   Quantity:  20 tablet   Refills:  0       fluticasone 50 MCG/ACT spray   Commonly known as:  FLONASE   Used for:  Sinus congestion   Started by:  Sanna Magana MD        Dose:  1-2 spray   Spray 1-2 sprays into both nostrils daily   Quantity:  1 Bottle   Refills:  0            Where to get your medicines      These medications were sent to [id number] - [place], [place] - [address] AT [facility] [address]  [address], [place] [address]     Phone:  [phone]     fluticasone 50 MCG/ACT spray         Some of these will need a paper prescription and others can be bought over the " counter.  Ask your nurse if you have questions.     Bring a paper prescription for each of these medications     amoxicillin-clavulanate 875-125 MG per tablet                Primary Care Provider Office Phone # Fax #    Panfilo Solares [phone]       [facility] [address] [address]        Equal Access to Services     [facility]: Hadii aad ku hadasho Soomaali, waaxda luqadaha, qaybta kaalmada adeegyada, waxay idiin hayaan adeeg kharash la'aamabel . So [facility] [phone].    ATENCIÓN: Si habla español, tiene a cervantes disposición servicios gratuitos de asistencia lingüística. Kaelave al [phone].    We comply with applicable federal civil rights laws and [place] laws. We do not discriminate on the basis of race, color, national origin, age, disability sex, sexual orientation or gender identity.            Thank you!     Thank you for choosing [facility]  for your care. Our goal is always to provide you with excellent care. Hearing back from our patients is one way we can continue to improve our services. Please take a few minutes to complete the written survey that you may receive in the mail after your visit with us. Thank you!             Your Updated Medication List - Protect others around you: Learn how to safely use, store and throw away your medicines at www.disposemymeds.org.          This list is accurate as of: 8/5/17 12:12 PM.  Always use your most recent med list.                   Brand Name Dispense Instructions for use Diagnosis    amoxicillin-clavulanate 875-125 MG per tablet    AUGMENTIN    20 tablet    Take 1 tablet by mouth 2 times daily    Sinus congestion       ascorbic acid 1000 MG Tabs    vitamin C     Take 1,000 mg by mouth three times a week.        aspirin 81 MG tablet     100    1 tab po QD (Once per day)        CALCIUM 600 PO           fluticasone 50 MCG/ACT spray    FLONASE    1 Bottle    Spray 1-2 sprays  into both nostrils daily    Sinus congestion       HOMEOPATHIC PRODUCTS PO      Take  by mouth daily.        * levothyroxine 125 MCG tablet    SYNTHROID/LEVOTHROID    90 tablet    TAKE 1 TABLET BY MOUTH 2 DAYS A WEEK    Acquired hypothyroidism       * levothyroxine 112 MCG tablet    SYNTHROID/LEVOTHROID    65 tablet    Take 1 tablet 5 days a week    Other specified hypothyroidism       LORazepam 0.5 MG tablet    ATIVAN    20 tablet    Take 1 tablet (0.5 mg) by mouth as needed for anxiety    Anxiety attack, Flying phobia       nitroFURantoin (macrocrystal-monohydrate) 100 MG capsule    MACROBID    14 capsule    Take 1 capsule (100 mg) by mouth 2 times daily    Dysuria       propranolol 20 MG tablet    INDERAL    50 tablet    Take 1 tablet (20 mg) by mouth once as needed    Paroxysmal atrial fibrillation (H)       vitamin D 1000 UNITS capsule     30    1 CAPSULE DAILY        * Notice:  This list has 2 medication(s) that are the same as other medications prescribed for you. Read the directions carefully, and ask your doctor or other care provider to review them with you.

## 2017-08-05 NOTE — PATIENT INSTRUCTIONS
Return for chest x-ray in 1 hour when technician arrives    Blood test for blood counts and thyroid    flonase for sinus congestion  Start oral antibiotics if flonase not improving symptoms     Schedule appointment for recheck at primary later this coming week    Platelet count 75.  Please recheck at next visit  White count & hemoglobin are normal

## 2017-08-05 NOTE — NURSING NOTE
"Chief Complaint   Patient presents with     Urgent Care     Pt in clinic to have eval for sinus pain and fatigue.     Sinus Problem     Fatigue       Initial /66  Pulse 56  Temp 97.8  F (36.6  C) (Tympanic)  Ht 5' 5\" (1.651 m)  Wt 156 lb (70.8 kg)  LMP 09/14/2006  SpO2 99%  BMI 25.96 kg/m2 Estimated body mass index is 25.96 kg/(m^2) as calculated from the following:    Height as of this encounter: 5' 5\" (1.651 m).    Weight as of this encounter: 156 lb (70.8 kg).  Medication Reconciliation: complete   Abi Florian/ MA    "

## 2017-08-05 NOTE — PROGRESS NOTES
SUBJECTIVE:   Adalgisa Dennis is a 67 year old female presenting with a chief complaint of  Chief Complaint   Patient presents with     Urgent Care     Pt in clinic to have eval for sinus pain and fatigue.     Sinus Problem     Fatigue     5/1/2-17 - had huge collapse/exhausted.  Seen at [facility]  Test normal   Then Had a CPE.  Test results normal   Now sick for 1 week  Huge depression and exhaustion  Heaving to walking up stairs  No cough  Having sinus tenderness and drainage.  Wonders if having walking pneumonia    Not shortness of breath but thinks has pneumonia  No chest pain     No fevers    Current and Associated symptoms: malaise  Treatment measures tried include None tried.  Predisposing factors include seasonal allergies.   HX of asthma -= cold air  Hx sinus stuff  No exposure to smoke,  Has lone Afib      Past Medical History:   Diagnosis Date     Abnormal glandular Papanicolaou smear of cervix 2002    yearly paps since 2003     Cyst of thyroid      Depressive disorder, not elsewhere classified      Genital herpes, unspecified      Leiomyoma of uterus, unspecified      Current Outpatient Prescriptions   Medication Sig Dispense Refill     fluticasone (FLONASE) 50 MCG/ACT spray Spray 1-2 sprays into both nostrils daily 1 Bottle 0     amoxicillin-clavulanate (AUGMENTIN) 875-125 MG per tablet Take 1 tablet by mouth 2 times daily 20 tablet 0     propranolol (INDERAL) 20 MG tablet Take 1 tablet (20 mg) by mouth once as needed 50 tablet 1     LORazepam (ATIVAN) 0.5 MG tablet Take 1 tablet (0.5 mg) by mouth as needed for anxiety 20 tablet 0     levothyroxine (SYNTHROID/LEVOTHROID) 112 MCG tablet Take 1 tablet 5 days a week 65 tablet 2     levothyroxine (SYNTHROID/LEVOTHROID) 125 MCG tablet TAKE 1 TABLET BY MOUTH 2 DAYS A WEEK 90 tablet 1     Calcium Carbonate (CALCIUM 600 PO)        ASPIRIN 81 MG OR TABS 1 tab po QD (Once per day) 100 3     nitrofurantoin, macrocrystal-monohydrate, (MACROBID) 100 MG capsule Take  "1 capsule (100 mg) by mouth 2 times daily (Patient not taking: Reported on 8/5/2017) 14 capsule 0     HOMEOPATHIC PRODUCTS PO Take  by mouth daily.       ascorbic acid (VITAMIN C) 1000 MG TABS Take 1,000 mg by mouth three times a week.       VITAMIN D 1000 UNIT OR CAPS 1 CAPSULE DAILY 30 0     Social History   Substance Use Topics     Smoking status: Former Smoker     Smokeless tobacco: Former User     Alcohol use 0.0 oz/week     0 Standard drinks or equivalent per week      Comment: Social ETOH       ROS:  CONSTITUTIONAL:above  ENT/MOUTH: NO cold symptoms   But has sinus congestion  RESP:above    OBJECTIVE  :/66  Pulse 56  Temp 97.8  F (36.6  C) (Tympanic)  Ht 5' 5\" (1.651 m)  Wt 156 lb (70.8 kg)  LMP 09/14/2006  SpO2 99%  BMI 25.96 kg/m2  GENERAL APPEARANCE: healthy, alert and no distress  HENT: ear canals and TM's normal.  Nose and mouth without ulcers, erythema or lesions  NECK: supple, nontender, no lymphadenopathy  RESP: lungs clear to auscultation - no rales, rhonchi or wheezes  CV: regular rates and rhythm, normal S1 S2, no murmur noted    ASSESSMENT:    ICD-10-CM    1. Fatigue, unspecified type R53.83 CBC with platelets and differential     TSH with free T4 reflex     XR Chest 2 Views   2. Sinus congestion R09.81 fluticasone (FLONASE) 50 MCG/ACT spray     XR Chest 2 Views     amoxicillin-clavulanate (AUGMENTIN) 875-125 MG per tablet   3. SOB (shortness of breath) R06.02 XR Chest 2 Views   4. Thrombocytopenia (H) D69.6      Has had low platelets 100-130's after reviewing chart    PLAN:  Patient Instructions   Return for chest x-ray in 1 hour when technician arrives    Blood test for blood counts and thyroid    flonase for sinus congestion  Start oral antibiotics if flonase not improving symptoms     Schedule appointment for recheck at primary later this coming week    Platelet count 75.  Please recheck at next visit  White count & hemoglobin are normal        Platelet Antibody 05/16/2006  2:55 " PM 51   (Note)  Platelet Antibody Test Results:  Platelet Antibody Screen:        Negative  HLA Class I Antibody Screen:     Negative     Range 100-130s      Addendum for chest x-ray - appears negative l  Radiology review pending.    Asked to hold aspirin until recheck

## 2017-08-15 DIAGNOSIS — E03.9 ACQUIRED HYPOTHYROIDISM: ICD-10-CM

## 2017-08-15 RX ORDER — LEVOTHYROXINE SODIUM 125 UG/1
TABLET ORAL
Start: 2017-08-15

## 2017-08-15 NOTE — TELEPHONE ENCOUNTER
Denied  Rx sent to [facility] ([phone]) 1/12/2017 for 1+ year supply, please call to transfer if patient wants to fill at [facility]  Ana ONEAL RN    Pending Prescriptions:                       Disp   Refills    levothyroxine (SYNTHROID/LEVOTHROID) 125 M*18 tab*4        Sig: TAKE 1 TABLET (125MCG) BY MOUTH TWO DAYS PER WEEK

## 2017-08-18 DIAGNOSIS — I48.0 PAROXYSMAL ATRIAL FIBRILLATION (H): ICD-10-CM

## 2017-08-21 RX ORDER — PROPRANOLOL HYDROCHLORIDE 20 MG/1
TABLET ORAL
Qty: 50 TABLET | Refills: 1 | Status: SHIPPED | OUTPATIENT
Start: 2017-08-21 | End: 2017-08-29

## 2017-08-21 NOTE — TELEPHONE ENCOUNTER
Inderal       Last Written Prescription Date: 06/27/2017  Last Fill Quantity: 50, # refills: 1  Last Office Visit with G, P or [facility] prescribing provider: 06/27/2017       Potassium   Date Value Ref Range Status   04/21/2016 4.6 3.4 - 5.3 mmol/L Final     Creatinine   Date Value Ref Range Status   04/21/2016 0.65 0.52 - 1.04 mg/dL Final     BP Readings from Last 3 Encounters:   08/05/17 104/66   06/27/17 97/51   04/05/17 118/70

## 2017-08-21 NOTE — TELEPHONE ENCOUNTER
Routing refill request to provider for review/approval because:  Drug not on the [facility] refill protocol for dx of Paroxysmal atrial fibrillation   Ana ONEAL RN

## 2017-08-22 NOTE — TELEPHONE ENCOUNTER
AS  Received fax from [facility].  Need clarification on how many pills she can take a day.  Instructions state take 1 tablet by mouth once as needed.  Does this mean one daily as needed?  Please clarify.  Thanks, Palak Siddiqi RN

## 2017-08-28 ENCOUNTER — OFFICE VISIT - HEALTHEAST (OUTPATIENT)
Dept: FAMILY MEDICINE | Facility: CLINIC | Age: 67
End: 2017-08-28

## 2017-08-28 DIAGNOSIS — R53.83 FATIGUE: ICD-10-CM

## 2017-08-28 DIAGNOSIS — F33.9 MAJOR DEPRESSION, RECURRENT (H): ICD-10-CM

## 2017-08-28 ASSESSMENT — MIFFLIN-ST. JEOR: SCORE: 1236.43

## 2017-08-29 RX ORDER — PROPRANOLOL HYDROCHLORIDE 20 MG/1
TABLET ORAL
Qty: 50 TABLET | Refills: 1 | Status: SHIPPED | OUTPATIENT
Start: 2017-08-29 | End: 2018-01-23

## 2017-08-31 ENCOUNTER — COMMUNICATION - HEALTHEAST (OUTPATIENT)
Dept: SCHEDULING | Facility: CLINIC | Age: 67
End: 2017-08-31

## 2017-08-31 DIAGNOSIS — R53.83 FATIGUE: ICD-10-CM

## 2017-09-01 ENCOUNTER — AMBULATORY - HEALTHEAST (OUTPATIENT)
Dept: LAB | Facility: CLINIC | Age: 67
End: 2017-09-01

## 2017-09-01 DIAGNOSIS — R53.83 FATIGUE: ICD-10-CM

## 2017-09-05 ENCOUNTER — COMMUNICATION - HEALTHEAST (OUTPATIENT)
Dept: FAMILY MEDICINE | Facility: CLINIC | Age: 67
End: 2017-09-05

## 2017-09-13 ENCOUNTER — COMMUNICATION - HEALTHEAST (OUTPATIENT)
Dept: FAMILY MEDICINE | Facility: CLINIC | Age: 67
End: 2017-09-13

## 2017-10-03 ENCOUNTER — TRANSFERRED RECORDS (OUTPATIENT)
Dept: HEALTH INFORMATION MANAGEMENT | Facility: CLINIC | Age: 67
End: 2017-10-03

## 2017-10-16 ENCOUNTER — OFFICE VISIT - HEALTHEAST (OUTPATIENT)
Dept: FAMILY MEDICINE | Facility: CLINIC | Age: 67
End: 2017-10-16

## 2017-10-16 DIAGNOSIS — F34.1 DYSTHYMIA: ICD-10-CM

## 2017-10-16 DIAGNOSIS — F43.9 SITUATIONAL STRESS: ICD-10-CM

## 2017-10-16 DIAGNOSIS — I48.91 ATRIAL FIBRILLATION (H): ICD-10-CM

## 2017-10-16 ASSESSMENT — MIFFLIN-ST. JEOR: SCORE: 1233.03

## 2017-11-27 DIAGNOSIS — F40.243 FLYING PHOBIA: ICD-10-CM

## 2017-11-27 DIAGNOSIS — F41.0 ANXIETY ATTACK: ICD-10-CM

## 2017-11-27 NOTE — TELEPHONE ENCOUNTER
Pending Prescriptions:                       Disp   Refills    LORazepam (ATIVAN) 0.5 MG tablet [Pharmacy*20 tab*PRN      Sig: TAKE 1 TABLET BY MOUTH AS NEEDED FOR ANXIETY         Last Written Prescription Date:  06/27/2017  Last Fill Quantity: 20,   # refills: 0  Last Office Visit: 06/27/2017  Future Office visit:       Routing refill request to provider for review/approval because:  Drug not on the [facility], P or [facility] refill protocol or controlled substance

## 2017-11-29 RX ORDER — LORAZEPAM 0.5 MG/1
TABLET ORAL
Qty: 20 TABLET | Status: SHIPPED | OUTPATIENT
Start: 2017-11-29 | End: 2021-10-25

## 2017-11-29 NOTE — TELEPHONE ENCOUNTER
AS,    Controlled Substance Refill Request for Ativan    Last refill: 8/18/2017 #20    Last clinic visit: 6/27/2017     Clinic visit frequency required: not documented  Next appt: none    Controlled substance agreement on file: No.    Documentation in problem list reviewed:  started, needs to be completed    Processing:  Fax Rx to [facility]    [place] reviewed[place]  reviewed- no concerns  [place]:  [email or web address]    Please authorize if appropriate.  Thanks,  Ana ONEAL RN

## 2017-11-29 NOTE — TELEPHONE ENCOUNTER
Please call patient   #20 tab- refill given for lorazepam  Any further refill- need return office visit

## 2017-12-15 ENCOUNTER — OFFICE VISIT - HEALTHEAST (OUTPATIENT)
Dept: FAMILY MEDICINE | Facility: CLINIC | Age: 67
End: 2017-12-15

## 2017-12-15 ENCOUNTER — COMMUNICATION - HEALTHEAST (OUTPATIENT)
Dept: TELEHEALTH | Facility: CLINIC | Age: 67
End: 2017-12-15

## 2017-12-15 DIAGNOSIS — J32.0 MAXILLARY SINUSITIS: ICD-10-CM

## 2017-12-15 DIAGNOSIS — R53.83 FATIGUE: ICD-10-CM

## 2017-12-20 ENCOUNTER — COMMUNICATION - HEALTHEAST (OUTPATIENT)
Dept: FAMILY MEDICINE | Facility: CLINIC | Age: 67
End: 2017-12-20

## 2018-01-23 DIAGNOSIS — I48.0 PAROXYSMAL ATRIAL FIBRILLATION (H): ICD-10-CM

## 2018-01-24 RX ORDER — PROPRANOLOL HYDROCHLORIDE 20 MG/1
TABLET ORAL
Qty: 50 TABLET | Refills: 1 | Status: SHIPPED | OUTPATIENT
Start: 2018-01-24 | End: 2022-05-31

## 2018-01-24 NOTE — TELEPHONE ENCOUNTER
"Prescription approved per [facility].    Requested Prescriptions   Signed Prescriptions Disp Refills     propranolol (INDERAL) 20 MG tablet 50 tablet 1     Sig: TAKE 1 TABLET (20 MG) BY MOUTH ONCE DAILY AS NEEDED    Beta-Blockers Protocol Passed    1/23/2018  6:04 PM       Passed - Blood pressure under 140/90    BP Readings from Last 3 Encounters:   08/05/17 104/66   06/27/17 97/51   04/05/17 118/70                Passed - Patient is age 6 or older       Passed - Recent or future visit with authorizing provider's specialty    Patient had office visit in the last year or has a visit in the next 30 days with authorizing provider.  See \"Patient Info\" tab in inbasket, or \"Choose Columns\" in Meds & Orders section of the refill encounter.               "

## 2018-01-26 DIAGNOSIS — E03.8 OTHER SPECIFIED HYPOTHYROIDISM: ICD-10-CM

## 2018-01-26 DIAGNOSIS — E03.9 ACQUIRED HYPOTHYROIDISM: ICD-10-CM

## 2018-01-26 RX ORDER — LEVOTHYROXINE SODIUM 125 UG/1
TABLET ORAL
Qty: 24 TABLET | Refills: 1 | Status: SHIPPED | OUTPATIENT
Start: 2018-01-26 | End: 2018-04-16

## 2018-01-26 RX ORDER — LEVOTHYROXINE SODIUM 112 UG/1
TABLET ORAL
Start: 2018-01-26

## 2018-01-26 NOTE — TELEPHONE ENCOUNTER
"Denied  Rx sent 6/27/2017 for 9 months  Ana ONEAL RN    Requested Prescriptions   Pending Prescriptions Disp Refills     levothyroxine (SYNTHROID/LEVOTHROID) 112 MCG tablet [Pharmacy Med Name: LEVOTHYROXINE 112 MCG TAB*# 112 TAB] 65 tablet 2     Sig: TAKE 1 TABLET 5 DAYS A WEEK    Thyroid Protocol Passed    1/26/2018 12:27 PM       Passed - Patient is 12 years or older       Passed - Recent or future visit with authorizing provider's specialty    Patient had office visit in the last year or has a visit in the next 30 days with authorizing provider.  See \"Patient Info\" tab in inbasket, or \"Choose Columns\" in Meds & Orders section of the refill encounter.            Passed - Normal TSH on file in past 12 months    Recent Labs   Lab Test  08/05/17   1134   TSH  2.45             Passed - No active pregnancy on record    If patient is pregnant or has had a positive pregnancy test, please check TSH.         Passed - No positive pregnancy test in past 12 months    If patient is pregnant or has had a positive pregnancy test, please check TSH.              "

## 2018-01-26 NOTE — TELEPHONE ENCOUNTER
"Prescription approved per [facility] Refill Protocol.  Ana ONEAL RN      Requested Prescriptions   Pending Prescriptions Disp Refills     levothyroxine (SYNTHROID/LEVOTHROID) 125 MCG tablet [Pharmacy Med Name: LEVOTHYROXINE 125 MCG TAB** 125 TAB] 90 tablet PRN     Sig: TAKE 1 TABLET BY MOUTH TWICE A WEEK    Thyroid Protocol Passed    1/26/2018 12:27 PM       Passed - Patient is 12 years or older       Passed - Recent or future visit with authorizing provider's specialty    Patient had office visit in the last year or has a visit in the next 30 days with authorizing provider.  See \"Patient Info\" tab in inbasket, or \"Choose Columns\" in Meds & Orders section of the refill encounter.            Passed - Normal TSH on file in past 12 months    Recent Labs   Lab Test  08/05/17   1134   TSH  2.45             Passed - No active pregnancy on record    If patient is pregnant or has had a positive pregnancy test, please check TSH.         Passed - No positive pregnancy test in past 12 months    If patient is pregnant or has had a positive pregnancy test, please check TSH.              "

## 2018-02-15 ENCOUNTER — RECORDS - HEALTHEAST (OUTPATIENT)
Dept: ADMINISTRATIVE | Facility: OTHER | Age: 68
End: 2018-02-15

## 2018-02-21 ENCOUNTER — RADIANT APPOINTMENT (OUTPATIENT)
Dept: MAMMOGRAPHY | Facility: CLINIC | Age: 68
End: 2018-02-21
Attending: INTERNAL MEDICINE
Payer: COMMERCIAL

## 2018-02-21 DIAGNOSIS — Z12.39 SCREENING BREAST EXAMINATION: ICD-10-CM

## 2018-04-16 DIAGNOSIS — E03.8 OTHER SPECIFIED HYPOTHYROIDISM: ICD-10-CM

## 2018-04-16 DIAGNOSIS — E03.9 ACQUIRED HYPOTHYROIDISM: ICD-10-CM

## 2018-04-16 NOTE — TELEPHONE ENCOUNTER
"Requested Prescriptions   Pending Prescriptions Disp Refills     levothyroxine (SYNTHROID/LEVOTHROID) 125 MCG tablet [Pharmacy Med Name: LEVOTHYROXINE 125 MCG TAB** 125 TAB]  Last Written Prescription Date:  01/26/2018  Last Fill Quantity: 24 tablet,  # refills: 1   Last Office Visit: 6/27/2017   Future Office Visit:    24 tablet 1     Sig: TAKE 1 TABLET BY MOUTH TWICE A WEEK    Thyroid Protocol Passed    4/16/2018 10:01 AM       Passed - Patient is 12 years or older       Passed - Recent (12 mo) or future (30 days) visit within the authorizing provider's specialty    Patient had office visit in the last 12 months or has a visit in the next 30 days with authorizing provider or within the authorizing provider's specialty.  See \"Patient Info\" tab in inbasket, or \"Choose Columns\" in Meds & Orders section of the refill encounter.           Passed - Normal TSH on file in past 12 months    Recent Labs   Lab Test  08/05/17   1134   TSH  2.45             Passed - No active pregnancy on record    If patient is pregnant or has had a positive pregnancy test, please check TSH.         Passed - No positive pregnancy test in past 12 months    If patient is pregnant or has had a positive pregnancy test, please check TSH.          levothyroxine (SYNTHROID/LEVOTHROID) 112 MCG tablet [Pharmacy Med Name: LEVOTHYROXINE 112 MCG TAB*# 112 TAB]  Last Written Prescription Date:  06/27/2017  Last Fill Quantity: 65 tablet,  # refills: 2   Last Office Visit: 6/27/2017   Future Office Visit:    65 tablet 2     Sig: TAKE 1 TABLET 5 DAYS A WEEK    Thyroid Protocol Passed    4/16/2018 10:01 AM       Passed - Patient is 12 years or older       Passed - Recent (12 mo) or future (30 days) visit within the authorizing provider's specialty    Patient had office visit in the last 12 months or has a visit in the next 30 days with authorizing provider or within the authorizing provider's specialty.  See \"Patient Info\" tab in inbasket, or \"Choose Columns\" " in Meds & Orders section of the refill encounter.           Passed - Normal TSH on file in past 12 months    Recent Labs   Lab Test  08/05/17   1134   TSH  2.45             Passed - No active pregnancy on record    If patient is pregnant or has had a positive pregnancy test, please check TSH.         Passed - No positive pregnancy test in past 12 months    If patient is pregnant or has had a positive pregnancy test, please check TSH.

## 2018-04-17 RX ORDER — LEVOTHYROXINE SODIUM 112 UG/1
TABLET ORAL
Qty: 65 TABLET | Refills: 1 | Status: SHIPPED | OUTPATIENT
Start: 2018-04-17 | End: 2021-09-02

## 2018-04-17 RX ORDER — LEVOTHYROXINE SODIUM 125 UG/1
TABLET ORAL
Qty: 24 TABLET | Refills: 1 | Status: SHIPPED | OUTPATIENT
Start: 2018-04-17 | End: 2021-09-02

## 2018-05-14 ENCOUNTER — OFFICE VISIT - HEALTHEAST (OUTPATIENT)
Dept: FAMILY MEDICINE | Facility: CLINIC | Age: 68
End: 2018-05-14

## 2018-05-14 DIAGNOSIS — R53.83 FATIGUE: ICD-10-CM

## 2018-05-14 DIAGNOSIS — E03.9 ACQUIRED HYPOTHYROIDISM: ICD-10-CM

## 2018-05-14 DIAGNOSIS — K92.9 DIGESTIVE DISORDER: ICD-10-CM

## 2018-05-14 LAB
BASOPHILS # BLD AUTO: 0.1 THOU/UL (ref 0–0.2)
BASOPHILS NFR BLD AUTO: 1 % (ref 0–2)
EOSINOPHIL # BLD AUTO: 0.3 THOU/UL (ref 0–0.4)
EOSINOPHIL NFR BLD AUTO: 3 % (ref 0–6)
ERYTHROCYTE [DISTWIDTH] IN BLOOD BY AUTOMATED COUNT: 13.2 % (ref 11–14.5)
GGT SERPL-CCNC: 14 U/L (ref 0–50)
HCT VFR BLD AUTO: 41.7 % (ref 35–47)
HGB BLD-MCNC: 13.9 G/DL (ref 12–16)
LYMPHOCYTES # BLD AUTO: 4.5 THOU/UL (ref 0.8–4.4)
LYMPHOCYTES NFR BLD AUTO: 49 % (ref 20–40)
MCH RBC QN AUTO: 31.3 PG (ref 27–34)
MCHC RBC AUTO-ENTMCNC: 33.3 G/DL (ref 32–36)
MCV RBC AUTO: 94 FL (ref 80–100)
MONOCYTES # BLD AUTO: 0.7 THOU/UL (ref 0–0.9)
MONOCYTES NFR BLD AUTO: 8 % (ref 2–10)
NEUTROPHILS # BLD AUTO: 3.6 THOU/UL (ref 2–7.7)
NEUTROPHILS NFR BLD AUTO: 39 % (ref 50–70)
PLATELET # BLD AUTO: 135 THOU/UL (ref 140–440)
PMV BLD AUTO: ABNORMAL FL (ref 8.5–12.5)
RBC # BLD AUTO: 4.44 MILL/UL (ref 3.8–5.4)
WBC: 9.2 THOU/UL (ref 4–11)

## 2018-05-17 LAB — T3REVERSE SERPL-MCNC: 21.5 NG/DL (ref 9–27)

## 2018-05-18 LAB
ARSENIC, WHOLE BLOOD: <10 NG/ML
LAB SAMPLE TYPE: NORMAL
LAB STATE REPORTED TO: NORMAL
LEAD, WHOLE BLOOD - HISTORICAL: 2 UG/DL
MERCURY, WHOLE BLOOD - HISTORICAL: <5 NG/ML
SPECIMEN STATUS: NORMAL

## 2018-05-22 ENCOUNTER — COMMUNICATION - HEALTHEAST (OUTPATIENT)
Dept: FAMILY MEDICINE | Facility: CLINIC | Age: 68
End: 2018-05-22

## 2018-05-30 ENCOUNTER — RECORDS - HEALTHEAST (OUTPATIENT)
Dept: ADMINISTRATIVE | Facility: OTHER | Age: 68
End: 2018-05-30

## 2018-06-06 ENCOUNTER — RECORDS - HEALTHEAST (OUTPATIENT)
Dept: ADMINISTRATIVE | Facility: OTHER | Age: 68
End: 2018-06-06

## 2018-06-11 ENCOUNTER — COMMUNICATION - HEALTHEAST (OUTPATIENT)
Dept: FAMILY MEDICINE | Facility: CLINIC | Age: 68
End: 2018-06-11

## 2018-06-15 ENCOUNTER — OFFICE VISIT - HEALTHEAST (OUTPATIENT)
Dept: FAMILY MEDICINE | Facility: CLINIC | Age: 68
End: 2018-06-15

## 2018-06-15 ENCOUNTER — COMMUNICATION - HEALTHEAST (OUTPATIENT)
Dept: FAMILY MEDICINE | Facility: CLINIC | Age: 68
End: 2018-06-15

## 2018-06-15 DIAGNOSIS — E27.9 ADRENAL GLAND DYSFUNCTION (H): ICD-10-CM

## 2018-06-15 DIAGNOSIS — K92.9 DIGESTIVE DISORDER: ICD-10-CM

## 2018-06-15 DIAGNOSIS — N94.10 DYSPAREUNIA IN FEMALE: ICD-10-CM

## 2018-06-15 DIAGNOSIS — R53.82 CHRONIC FATIGUE: ICD-10-CM

## 2018-06-18 ENCOUNTER — COMMUNICATION - HEALTHEAST (OUTPATIENT)
Dept: FAMILY MEDICINE | Facility: CLINIC | Age: 68
End: 2018-06-18

## 2018-07-09 ENCOUNTER — COMMUNICATION - HEALTHEAST (OUTPATIENT)
Dept: FAMILY MEDICINE | Facility: CLINIC | Age: 68
End: 2018-07-09

## 2018-07-13 ENCOUNTER — RECORDS - HEALTHEAST (OUTPATIENT)
Dept: ADMINISTRATIVE | Facility: OTHER | Age: 68
End: 2018-07-13

## 2018-07-16 ENCOUNTER — COMMUNICATION - HEALTHEAST (OUTPATIENT)
Dept: FAMILY MEDICINE | Facility: CLINIC | Age: 68
End: 2018-07-16

## 2018-07-18 ENCOUNTER — OFFICE VISIT - HEALTHEAST (OUTPATIENT)
Dept: FAMILY MEDICINE | Facility: CLINIC | Age: 68
End: 2018-07-18

## 2018-07-18 ENCOUNTER — COMMUNICATION - HEALTHEAST (OUTPATIENT)
Dept: FAMILY MEDICINE | Facility: CLINIC | Age: 68
End: 2018-07-18

## 2018-07-18 DIAGNOSIS — A69.20 LYME DISEASE: ICD-10-CM

## 2018-07-18 DIAGNOSIS — M79.621 PAIN OF RIGHT UPPER ARM: ICD-10-CM

## 2018-07-19 ENCOUNTER — COMMUNICATION - HEALTHEAST (OUTPATIENT)
Dept: FAMILY MEDICINE | Facility: CLINIC | Age: 68
End: 2018-07-19

## 2018-08-23 ENCOUNTER — COMMUNICATION - HEALTHEAST (OUTPATIENT)
Dept: FAMILY MEDICINE | Facility: CLINIC | Age: 68
End: 2018-08-23

## 2018-08-24 ENCOUNTER — AMBULATORY - HEALTHEAST (OUTPATIENT)
Dept: LAB | Facility: CLINIC | Age: 68
End: 2018-08-24

## 2018-08-24 DIAGNOSIS — M79.10 MYALGIA: ICD-10-CM

## 2018-08-24 DIAGNOSIS — M79.601 RIGHT UPPER LIMB PAIN: ICD-10-CM

## 2018-08-24 DIAGNOSIS — M25.511 RIGHT SHOULDER PAIN: ICD-10-CM

## 2018-08-24 DIAGNOSIS — R53.83 FATIGUE: ICD-10-CM

## 2018-08-24 DIAGNOSIS — E63.9 NUTRITIONAL DISORDER: ICD-10-CM

## 2018-08-24 DIAGNOSIS — I48.0 PAROXYSMAL ATRIAL FIBRILLATION (H): ICD-10-CM

## 2018-08-24 DIAGNOSIS — A69.20 LYME DISEASE: ICD-10-CM

## 2018-08-24 DIAGNOSIS — E55.9 VITAMIN D DEFICIENCY DISEASE: ICD-10-CM

## 2018-08-24 LAB
ALBUMIN SERPL-MCNC: 4.1 G/DL (ref 3.5–5)
ALP SERPL-CCNC: 64 U/L (ref 45–120)
ALT SERPL W P-5'-P-CCNC: 15 U/L (ref 0–45)
ANION GAP SERPL CALCULATED.3IONS-SCNC: 10 MMOL/L (ref 5–18)
AST SERPL W P-5'-P-CCNC: 21 U/L (ref 0–40)
BASOPHILS # BLD AUTO: 0.1 THOU/UL (ref 0–0.2)
BASOPHILS NFR BLD AUTO: 1 % (ref 0–2)
BILIRUB SERPL-MCNC: 0.9 MG/DL (ref 0–1)
BUN SERPL-MCNC: 19 MG/DL (ref 8–22)
CALCIUM SERPL-MCNC: 9.1 MG/DL (ref 8.5–10.5)
CHLORIDE BLD-SCNC: 107 MMOL/L (ref 98–107)
CO2 SERPL-SCNC: 22 MMOL/L (ref 22–31)
CREAT SERPL-MCNC: 0.8 MG/DL (ref 0.6–1.1)
EOSINOPHIL # BLD AUTO: 0.2 THOU/UL (ref 0–0.4)
EOSINOPHIL NFR BLD AUTO: 2 % (ref 0–6)
ERYTHROCYTE [DISTWIDTH] IN BLOOD BY AUTOMATED COUNT: 14.1 % (ref 11–14.5)
GFR SERPL CREATININE-BSD FRML MDRD: >60 ML/MIN/1.73M2
GLUCOSE BLD-MCNC: 104 MG/DL (ref 70–125)
HCT VFR BLD AUTO: 38.2 % (ref 35–47)
HGB BLD-MCNC: 13 G/DL (ref 12–16)
LYMPHOCYTES # BLD AUTO: 4 THOU/UL (ref 0.8–4.4)
LYMPHOCYTES NFR BLD AUTO: 47 % (ref 20–40)
MCH RBC QN AUTO: 31 PG (ref 27–34)
MCHC RBC AUTO-ENTMCNC: 34 G/DL (ref 32–36)
MCV RBC AUTO: 91 FL (ref 80–100)
MONOCYTES # BLD AUTO: 0.6 THOU/UL (ref 0–0.9)
MONOCYTES NFR BLD AUTO: 7 % (ref 2–10)
NEUTROPHILS # BLD AUTO: 3.7 THOU/UL (ref 2–7.7)
NEUTROPHILS NFR BLD AUTO: 43 % (ref 50–70)
PLATELET # BLD AUTO: 118 THOU/UL (ref 140–440)
PMV BLD AUTO: ABNORMAL FL (ref 8.5–12.5)
POTASSIUM BLD-SCNC: 4.5 MMOL/L (ref 3.5–5)
PROT SERPL-MCNC: 6.4 G/DL (ref 6–8)
RBC # BLD AUTO: 4.2 MILL/UL (ref 3.8–5.4)
SODIUM SERPL-SCNC: 139 MMOL/L (ref 136–145)
WBC: 8.6 THOU/UL (ref 4–11)

## 2018-08-27 LAB
25(OH)D3 SERPL-MCNC: 58.5 NG/ML (ref 30–80)
25(OH)D3 SERPL-MCNC: 58.5 NG/ML (ref 30–80)

## 2018-09-15 DIAGNOSIS — I48.0 PAROXYSMAL ATRIAL FIBRILLATION (H): ICD-10-CM

## 2018-09-17 NOTE — TELEPHONE ENCOUNTER
"Looks like pt being seen outside of   Sent MyChart to pt to determine if A.S still PCP  Ana ONEAL RN    Requested Prescriptions   Pending Prescriptions Disp Refills     propranolol (INDERAL) 20 MG tablet [Pharmacy Med Name: PROPRANOLOL 20 MG TABLET 20 TAB] 50 tablet 4     Sig: TAKE 1 TABLET (20 MG) BY MOUTH ONCE DAILY AS NEEDED    Beta-Blockers Protocol Failed    9/15/2018 10:43 AM       Failed - Blood pressure under 140/90 in past 12 months    BP Readings from Last 3 Encounters:   08/05/17 104/66   06/27/17 97/51   04/05/17 118/70                Failed - Recent (12 mo) or future (30 days) visit within the authorizing provider's specialty    Patient had office visit in the last 12 months or has a visit in the next 30 days with authorizing provider or within the authorizing provider's specialty.  See \"Patient Info\" tab in inbasket, or \"Choose Columns\" in Meds & Orders section of the refill encounter.           Passed - Patient is age 6 or older            "

## 2018-09-19 RX ORDER — PROPRANOLOL HYDROCHLORIDE 20 MG/1
TABLET ORAL
OUTPATIENT
Start: 2018-09-19

## 2018-09-19 NOTE — TELEPHONE ENCOUNTER
"Patient did not read MyChart  Denied refill request  \"Patient no longer under provider care.\"  Ana ONEAL RN    "

## 2018-09-20 ENCOUNTER — COMMUNICATION - HEALTHEAST (OUTPATIENT)
Dept: FAMILY MEDICINE | Facility: CLINIC | Age: 68
End: 2018-09-20

## 2018-10-03 DIAGNOSIS — E03.9 ACQUIRED HYPOTHYROIDISM: ICD-10-CM

## 2018-10-03 RX ORDER — LEVOTHYROXINE SODIUM 125 UG/1
TABLET ORAL
Start: 2018-10-03

## 2018-10-03 NOTE — TELEPHONE ENCOUNTER
"Denied  Patient not seeing Dr Holt  See provider outside of [facility]  Ana ONEAL RN    Requested Prescriptions   Pending Prescriptions Disp Refills     levothyroxine (SYNTHROID/LEVOTHROID) 125 MCG tablet [Pharmacy Med Name: LEVOTHYROXINE 125 MCG TAB** 125 TAB] 24 tablet 1     Sig: TAKE 1 TABLET BY MOUTH TWICE A WEEK    Thyroid Protocol Failed    10/3/2018  1:59 PM       Failed - Recent (12 mo) or future (30 days) visit within the authorizing provider's specialty    Patient had office visit in the last 12 months or has a visit in the next 30 days with authorizing provider or within the authorizing provider's specialty.  See \"Patient Info\" tab in inbasket, or \"Choose Columns\" in Meds & Orders section of the refill encounter.           Failed - Normal TSH on file in past 12 months    Recent Labs   Lab Test  08/05/17   1134   TSH  2.45             Passed - Patient is 12 years or older       Passed - No active pregnancy on record    If patient is pregnant or has had a positive pregnancy test, please check TSH.         Passed - No positive pregnancy test in past 12 months    If patient is pregnant or has had a positive pregnancy test, please check TSH.              "

## 2018-10-24 ENCOUNTER — COMMUNICATION - HEALTHEAST (OUTPATIENT)
Dept: FAMILY MEDICINE | Facility: CLINIC | Age: 68
End: 2018-10-24

## 2018-10-25 ENCOUNTER — COMMUNICATION - HEALTHEAST (OUTPATIENT)
Dept: FAMILY MEDICINE | Facility: CLINIC | Age: 68
End: 2018-10-25

## 2018-10-26 ENCOUNTER — AMBULATORY - HEALTHEAST (OUTPATIENT)
Dept: LAB | Facility: CLINIC | Age: 68
End: 2018-10-26

## 2018-10-26 DIAGNOSIS — M79.601 RIGHT UPPER LIMB PAIN: ICD-10-CM

## 2018-10-26 DIAGNOSIS — I48.0 PAROXYSMAL ATRIAL FIBRILLATION (H): ICD-10-CM

## 2018-10-26 DIAGNOSIS — A69.20 LYME DISEASE: ICD-10-CM

## 2018-10-26 DIAGNOSIS — M79.10 MYALGIA: ICD-10-CM

## 2018-10-26 DIAGNOSIS — M25.511 RIGHT SHOULDER PAIN: ICD-10-CM

## 2018-10-26 DIAGNOSIS — E55.9 VITAMIN D DEFICIENCY DISEASE: ICD-10-CM

## 2018-10-26 DIAGNOSIS — E63.9 NUTRITIONAL DISORDER: ICD-10-CM

## 2018-10-26 DIAGNOSIS — R53.83 FATIGUE: ICD-10-CM

## 2018-10-26 LAB
ALBUMIN SERPL-MCNC: 4.3 G/DL (ref 3.5–5)
ALP SERPL-CCNC: 72 U/L (ref 45–120)
ALT SERPL W P-5'-P-CCNC: 18 U/L (ref 0–45)
ANION GAP SERPL CALCULATED.3IONS-SCNC: 8 MMOL/L (ref 5–18)
AST SERPL W P-5'-P-CCNC: 20 U/L (ref 0–40)
BASOPHILS # BLD AUTO: 0.1 THOU/UL (ref 0–0.2)
BASOPHILS NFR BLD AUTO: 1 % (ref 0–2)
BILIRUB SERPL-MCNC: 0.6 MG/DL (ref 0–1)
BUN SERPL-MCNC: 16 MG/DL (ref 8–22)
CALCIUM SERPL-MCNC: 9.7 MG/DL (ref 8.5–10.5)
CHLORIDE BLD-SCNC: 103 MMOL/L (ref 98–107)
CO2 SERPL-SCNC: 28 MMOL/L (ref 22–31)
CREAT SERPL-MCNC: 0.92 MG/DL (ref 0.6–1.1)
EOSINOPHIL # BLD AUTO: 0.2 THOU/UL (ref 0–0.4)
EOSINOPHIL NFR BLD AUTO: 3 % (ref 0–6)
ERYTHROCYTE [DISTWIDTH] IN BLOOD BY AUTOMATED COUNT: 13.2 % (ref 11–14.5)
GFR SERPL CREATININE-BSD FRML MDRD: >60 ML/MIN/1.73M2
GLUCOSE BLD-MCNC: 95 MG/DL (ref 70–125)
HCT VFR BLD AUTO: 40.9 % (ref 35–47)
HGB BLD-MCNC: 13.1 G/DL (ref 12–16)
LYMPHOCYTES # BLD AUTO: 4.3 THOU/UL (ref 0.8–4.4)
LYMPHOCYTES NFR BLD AUTO: 51 % (ref 20–40)
MCH RBC QN AUTO: 30.9 PG (ref 27–34)
MCHC RBC AUTO-ENTMCNC: 32 G/DL (ref 32–36)
MCV RBC AUTO: 97 FL (ref 80–100)
MONOCYTES # BLD AUTO: 0.7 THOU/UL (ref 0–0.9)
MONOCYTES NFR BLD AUTO: 8 % (ref 2–10)
NEUTROPHILS # BLD AUTO: 3.1 THOU/UL (ref 2–7.7)
NEUTROPHILS NFR BLD AUTO: 38 % (ref 50–70)
PLATELET # BLD AUTO: 119 THOU/UL (ref 140–440)
PMV BLD AUTO: ABNORMAL FL (ref 8.5–12.5)
POTASSIUM BLD-SCNC: 4 MMOL/L (ref 3.5–5)
PROT SERPL-MCNC: 6.5 G/DL (ref 6–8)
RBC # BLD AUTO: 4.24 MILL/UL (ref 3.8–5.4)
SODIUM SERPL-SCNC: 139 MMOL/L (ref 136–145)
WBC: 8.3 THOU/UL (ref 4–11)

## 2018-10-31 ENCOUNTER — OFFICE VISIT - HEALTHEAST (OUTPATIENT)
Dept: FAMILY MEDICINE | Facility: CLINIC | Age: 68
End: 2018-10-31

## 2018-10-31 ENCOUNTER — COMMUNICATION - HEALTHEAST (OUTPATIENT)
Dept: FAMILY MEDICINE | Facility: CLINIC | Age: 68
End: 2018-10-31

## 2018-10-31 DIAGNOSIS — N89.8 VAGINAL IRRITATION: ICD-10-CM

## 2018-10-31 LAB
CLUE CELLS: ABNORMAL
TRICHOMONAS, WET PREP: ABNORMAL
YEAST, WET PREP: ABNORMAL

## 2018-10-31 ASSESSMENT — MIFFLIN-ST. JEOR: SCORE: 1237.56

## 2018-11-01 ENCOUNTER — AMBULATORY - HEALTHEAST (OUTPATIENT)
Dept: LAB | Facility: CLINIC | Age: 68
End: 2018-11-01

## 2018-11-01 DIAGNOSIS — R53.83 FATIGUE: ICD-10-CM

## 2018-11-01 DIAGNOSIS — M79.601 ARM PAIN, RIGHT: ICD-10-CM

## 2018-11-01 DIAGNOSIS — M25.511 RIGHT SHOULDER PAIN: ICD-10-CM

## 2018-11-01 DIAGNOSIS — T56.5X4S TOXIC EFFECT OF ZINC AND ITS COMPOUNDS, UNDETERMINED, SEQUELA: ICD-10-CM

## 2018-11-01 DIAGNOSIS — E63.9 NUTRITIONAL DISORDER: ICD-10-CM

## 2018-11-01 DIAGNOSIS — A69.22 LYME NEUROPATHY: ICD-10-CM

## 2018-11-01 DIAGNOSIS — E55.9 AVITAMINOSIS D: ICD-10-CM

## 2018-11-01 DIAGNOSIS — R74.9 ABNORMAL SERUM ENZYME LEVEL: ICD-10-CM

## 2018-11-01 DIAGNOSIS — M79.10 MYALGIA: ICD-10-CM

## 2018-11-01 DIAGNOSIS — A69.20 LYME DISEASE: ICD-10-CM

## 2018-11-01 DIAGNOSIS — I48.0 PAROXYSMAL ATRIAL FIBRILLATION (H): ICD-10-CM

## 2018-11-06 ENCOUNTER — TRANSFERRED RECORDS (OUTPATIENT)
Dept: HEALTH INFORMATION MANAGEMENT | Facility: CLINIC | Age: 68
End: 2018-11-06

## 2018-11-15 ENCOUNTER — OFFICE VISIT - HEALTHEAST (OUTPATIENT)
Dept: OBGYN | Facility: CLINIC | Age: 68
End: 2018-11-15

## 2018-11-15 DIAGNOSIS — N94.10 DYSPAREUNIA IN FEMALE: ICD-10-CM

## 2018-11-15 DIAGNOSIS — N90.5 VULVAR ATROPHY: ICD-10-CM

## 2018-11-15 DIAGNOSIS — N95.2 VAGINAL ATROPHY: ICD-10-CM

## 2018-11-15 ASSESSMENT — MIFFLIN-ST. JEOR: SCORE: 1228.49

## 2018-11-16 ENCOUNTER — OFFICE VISIT - HEALTHEAST (OUTPATIENT)
Dept: FAMILY MEDICINE | Facility: CLINIC | Age: 68
End: 2018-11-16

## 2018-11-16 DIAGNOSIS — Z01.818 PREOP GENERAL PHYSICAL EXAM: ICD-10-CM

## 2018-11-16 DIAGNOSIS — H25.013 CATARACT CORTICAL, SENILE, BILATERAL: ICD-10-CM

## 2018-11-16 ASSESSMENT — MIFFLIN-ST. JEOR: SCORE: 1234.16

## 2018-11-21 ENCOUNTER — COMMUNICATION - HEALTHEAST (OUTPATIENT)
Dept: SCHEDULING | Facility: CLINIC | Age: 68
End: 2018-11-21

## 2018-11-23 ENCOUNTER — OFFICE VISIT - HEALTHEAST (OUTPATIENT)
Dept: FAMILY MEDICINE | Facility: CLINIC | Age: 68
End: 2018-11-23

## 2018-11-23 DIAGNOSIS — M79.10 MYALGIA: ICD-10-CM

## 2018-11-23 DIAGNOSIS — R53.83 FATIGUE: ICD-10-CM

## 2018-11-23 DIAGNOSIS — M79.601 ARM PAIN, RIGHT: ICD-10-CM

## 2018-11-23 DIAGNOSIS — E55.9 AVITAMINOSIS D: ICD-10-CM

## 2018-11-23 DIAGNOSIS — R74.9 ABNORMAL SERUM ENZYME LEVEL: ICD-10-CM

## 2018-11-23 DIAGNOSIS — I48.0 PAROXYSMAL ATRIAL FIBRILLATION (H): ICD-10-CM

## 2018-11-23 DIAGNOSIS — A69.20 LYME DISEASE: ICD-10-CM

## 2018-11-23 DIAGNOSIS — T56.5X4S TOXIC EFFECT OF ZINC AND ITS COMPOUNDS, UNDETERMINED, SEQUELA: ICD-10-CM

## 2018-11-23 DIAGNOSIS — R23.3 BRUISING, SPONTANEOUS: ICD-10-CM

## 2018-11-23 DIAGNOSIS — E63.9 NUTRITIONAL DISORDER: ICD-10-CM

## 2018-11-23 DIAGNOSIS — M25.511 RIGHT SHOULDER PAIN: ICD-10-CM

## 2018-11-23 DIAGNOSIS — A69.22 LYME NEUROPATHY: ICD-10-CM

## 2018-11-23 LAB
ALBUMIN SERPL-MCNC: 3.9 G/DL (ref 3.5–5)
ALP SERPL-CCNC: 78 U/L (ref 45–120)
ALT SERPL W P-5'-P-CCNC: 20 U/L (ref 0–45)
ANION GAP SERPL CALCULATED.3IONS-SCNC: 13 MMOL/L (ref 5–18)
AST SERPL W P-5'-P-CCNC: 18 U/L (ref 0–40)
BASOPHILS # BLD AUTO: 0.1 THOU/UL (ref 0–0.2)
BASOPHILS NFR BLD AUTO: 1 % (ref 0–2)
BILIRUB SERPL-MCNC: 0.5 MG/DL (ref 0–1)
BUN SERPL-MCNC: 20 MG/DL (ref 8–22)
CALCIUM SERPL-MCNC: 9.8 MG/DL (ref 8.5–10.5)
CHLORIDE BLD-SCNC: 105 MMOL/L (ref 98–107)
CO2 SERPL-SCNC: 21 MMOL/L (ref 22–31)
CREAT SERPL-MCNC: 0.86 MG/DL (ref 0.6–1.1)
EOSINOPHIL # BLD AUTO: 0.2 THOU/UL (ref 0–0.4)
EOSINOPHIL NFR BLD AUTO: 2 % (ref 0–6)
ERYTHROCYTE [DISTWIDTH] IN BLOOD BY AUTOMATED COUNT: 13 % (ref 11–14.5)
GFR SERPL CREATININE-BSD FRML MDRD: >60 ML/MIN/1.73M2
GLUCOSE BLD-MCNC: 103 MG/DL (ref 70–125)
HCT VFR BLD AUTO: 42.4 % (ref 35–47)
HGB BLD-MCNC: 14 G/DL (ref 12–16)
LYMPHOCYTES # BLD AUTO: 4.4 THOU/UL (ref 0.8–4.4)
LYMPHOCYTES NFR BLD AUTO: 46 % (ref 20–40)
MCH RBC QN AUTO: 30.8 PG (ref 27–34)
MCHC RBC AUTO-ENTMCNC: 33 G/DL (ref 32–36)
MCV RBC AUTO: 93 FL (ref 80–100)
MONOCYTES # BLD AUTO: 0.7 THOU/UL (ref 0–0.9)
MONOCYTES NFR BLD AUTO: 7 % (ref 2–10)
NEUTROPHILS # BLD AUTO: 4.3 THOU/UL (ref 2–7.7)
NEUTROPHILS NFR BLD AUTO: 45 % (ref 50–70)
PLATELET # BLD AUTO: 129 THOU/UL (ref 140–440)
PMV BLD AUTO: ABNORMAL FL (ref 8.5–12.5)
POTASSIUM BLD-SCNC: 4.5 MMOL/L (ref 3.5–5)
PROT SERPL-MCNC: 6.5 G/DL (ref 6–8)
RBC # BLD AUTO: 4.54 MILL/UL (ref 3.8–5.4)
SODIUM SERPL-SCNC: 139 MMOL/L (ref 136–145)
WBC: 9.6 THOU/UL (ref 4–11)

## 2018-11-23 ASSESSMENT — MIFFLIN-ST. JEOR: SCORE: 1254.57

## 2018-11-27 LAB
COPPER SERPL-MCNC: 94 UG/DL (ref 80–155)
ZINC SERPL-MCNC: 67 UG/DL (ref 60–120)

## 2018-11-28 ENCOUNTER — COMMUNICATION - HEALTHEAST (OUTPATIENT)
Dept: FAMILY MEDICINE | Facility: CLINIC | Age: 68
End: 2018-11-28

## 2018-12-19 ENCOUNTER — COMMUNICATION - HEALTHEAST (OUTPATIENT)
Dept: FAMILY MEDICINE | Facility: CLINIC | Age: 68
End: 2018-12-19

## 2018-12-19 DIAGNOSIS — E03.9 HYPOTHYROIDISM: ICD-10-CM

## 2019-01-07 ENCOUNTER — OFFICE VISIT - HEALTHEAST (OUTPATIENT)
Dept: FAMILY MEDICINE | Facility: CLINIC | Age: 69
End: 2019-01-07

## 2019-01-07 DIAGNOSIS — Z12.31 VISIT FOR SCREENING MAMMOGRAM: ICD-10-CM

## 2019-01-07 DIAGNOSIS — E03.9 ACQUIRED HYPOTHYROIDISM: ICD-10-CM

## 2019-01-07 DIAGNOSIS — A69.20 LYME DISEASE: ICD-10-CM

## 2019-01-07 LAB
BASOPHILS # BLD AUTO: 0.1 THOU/UL (ref 0–0.2)
BASOPHILS NFR BLD AUTO: 1 % (ref 0–2)
EOSINOPHIL # BLD AUTO: 0.2 THOU/UL (ref 0–0.4)
EOSINOPHIL NFR BLD AUTO: 2 % (ref 0–6)
ERYTHROCYTE [DISTWIDTH] IN BLOOD BY AUTOMATED COUNT: 13.4 % (ref 11–14.5)
HCT VFR BLD AUTO: 45.4 % (ref 35–47)
HGB BLD-MCNC: 15 G/DL (ref 12–16)
LYMPHOCYTES # BLD AUTO: 3.7 THOU/UL (ref 0.8–4.4)
LYMPHOCYTES NFR BLD AUTO: 46 % (ref 20–40)
MCH RBC QN AUTO: 31.1 PG (ref 27–34)
MCHC RBC AUTO-ENTMCNC: 33 G/DL (ref 32–36)
MCV RBC AUTO: 94 FL (ref 80–100)
MONOCYTES # BLD AUTO: 0.7 THOU/UL (ref 0–0.9)
MONOCYTES NFR BLD AUTO: 9 % (ref 2–10)
NEUTROPHILS # BLD AUTO: 3.3 THOU/UL (ref 2–7.7)
NEUTROPHILS NFR BLD AUTO: 42 % (ref 50–70)
PLATELET # BLD AUTO: 142 THOU/UL (ref 140–440)
PMV BLD AUTO: ABNORMAL FL (ref 8.5–12.5)
RBC # BLD AUTO: 4.83 MILL/UL (ref 3.8–5.4)
T3FREE SERPL-MCNC: 2.4 PG/ML (ref 1.9–3.9)
T4 FREE SERPL-MCNC: 1.5 NG/DL (ref 0.7–1.8)
TSH SERPL DL<=0.005 MIU/L-ACNC: 0.59 UIU/ML (ref 0.3–5)
WBC: 7.9 THOU/UL (ref 4–11)

## 2019-01-07 ASSESSMENT — MIFFLIN-ST. JEOR: SCORE: 1250.03

## 2019-01-08 ENCOUNTER — COMMUNICATION - HEALTHEAST (OUTPATIENT)
Dept: FAMILY MEDICINE | Facility: CLINIC | Age: 69
End: 2019-01-08

## 2019-01-29 ENCOUNTER — COMMUNICATION - HEALTHEAST (OUTPATIENT)
Dept: FAMILY MEDICINE | Facility: CLINIC | Age: 69
End: 2019-01-29

## 2019-01-30 ENCOUNTER — COMMUNICATION - HEALTHEAST (OUTPATIENT)
Dept: FAMILY MEDICINE | Facility: CLINIC | Age: 69
End: 2019-01-30

## 2019-01-31 ENCOUNTER — COMMUNICATION - HEALTHEAST (OUTPATIENT)
Dept: FAMILY MEDICINE | Facility: CLINIC | Age: 69
End: 2019-01-31

## 2019-01-31 DIAGNOSIS — M25.511 CHRONIC RIGHT SHOULDER PAIN: ICD-10-CM

## 2019-01-31 DIAGNOSIS — G89.29 CHRONIC RIGHT SHOULDER PAIN: ICD-10-CM

## 2019-02-12 ENCOUNTER — COMMUNICATION - HEALTHEAST (OUTPATIENT)
Dept: FAMILY MEDICINE | Facility: CLINIC | Age: 69
End: 2019-02-12

## 2019-02-12 DIAGNOSIS — D22.9 ATYPICAL MOLE: ICD-10-CM

## 2019-02-28 ENCOUNTER — RECORDS - HEALTHEAST (OUTPATIENT)
Dept: ADMINISTRATIVE | Facility: OTHER | Age: 69
End: 2019-02-28

## 2019-02-28 ENCOUNTER — ANCILLARY PROCEDURE (OUTPATIENT)
Dept: MAMMOGRAPHY | Facility: CLINIC | Age: 69
End: 2019-02-28
Attending: FAMILY MEDICINE
Payer: COMMERCIAL

## 2019-02-28 DIAGNOSIS — Z12.31 VISIT FOR SCREENING MAMMOGRAM: ICD-10-CM

## 2019-03-05 ENCOUNTER — RECORDS - HEALTHEAST (OUTPATIENT)
Dept: ADMINISTRATIVE | Facility: OTHER | Age: 69
End: 2019-03-05

## 2019-03-15 ENCOUNTER — OFFICE VISIT - HEALTHEAST (OUTPATIENT)
Dept: FAMILY MEDICINE | Facility: CLINIC | Age: 69
End: 2019-03-15

## 2019-03-15 DIAGNOSIS — Z01.818 PREOP GENERAL PHYSICAL EXAM: ICD-10-CM

## 2019-03-15 DIAGNOSIS — H25.9 SENILE CATARACT OF RIGHT EYE, UNSPECIFIED AGE-RELATED CATARACT TYPE: ICD-10-CM

## 2019-03-15 DIAGNOSIS — I48.0 PAROXYSMAL ATRIAL FIBRILLATION (H): ICD-10-CM

## 2019-03-18 ENCOUNTER — COMMUNICATION - HEALTHEAST (OUTPATIENT)
Dept: FAMILY MEDICINE | Facility: CLINIC | Age: 69
End: 2019-03-18

## 2019-04-01 ENCOUNTER — COMMUNICATION - HEALTHEAST (OUTPATIENT)
Dept: FAMILY MEDICINE | Facility: CLINIC | Age: 69
End: 2019-04-01

## 2019-04-01 DIAGNOSIS — E03.9 HYPOTHYROIDISM: ICD-10-CM

## 2019-04-15 ENCOUNTER — COMMUNICATION - HEALTHEAST (OUTPATIENT)
Dept: FAMILY MEDICINE | Facility: CLINIC | Age: 69
End: 2019-04-15

## 2019-04-15 DIAGNOSIS — F41.9 ANXIETY: ICD-10-CM

## 2019-04-15 DIAGNOSIS — M81.0 SENILE OSTEOPOROSIS: ICD-10-CM

## 2019-05-01 ENCOUNTER — OFFICE VISIT - HEALTHEAST (OUTPATIENT)
Dept: FAMILY MEDICINE | Facility: CLINIC | Age: 69
End: 2019-05-01

## 2019-05-01 DIAGNOSIS — F40.243 FEAR OF FLYING: ICD-10-CM

## 2019-05-01 DIAGNOSIS — A69.20 LYME DISEASE: ICD-10-CM

## 2019-05-01 DIAGNOSIS — M85.80 OSTEOPENIA, UNSPECIFIED LOCATION: ICD-10-CM

## 2019-05-01 ASSESSMENT — MIFFLIN-ST. JEOR: SCORE: 1259.1

## 2019-05-07 ENCOUNTER — TRANSFERRED RECORDS (OUTPATIENT)
Dept: HEALTH INFORMATION MANAGEMENT | Facility: CLINIC | Age: 69
End: 2019-05-07

## 2019-06-01 ENCOUNTER — COMMUNICATION - HEALTHEAST (OUTPATIENT)
Dept: FAMILY MEDICINE | Facility: CLINIC | Age: 69
End: 2019-06-01

## 2019-06-01 DIAGNOSIS — I48.0 PAROXYSMAL ATRIAL FIBRILLATION (H): ICD-10-CM

## 2019-07-09 ENCOUNTER — COMMUNICATION - HEALTHEAST (OUTPATIENT)
Dept: FAMILY MEDICINE | Facility: CLINIC | Age: 69
End: 2019-07-09

## 2019-07-09 DIAGNOSIS — E03.9 ACQUIRED HYPOTHYROIDISM: ICD-10-CM

## 2019-08-08 ENCOUNTER — COMMUNICATION - HEALTHEAST (OUTPATIENT)
Dept: FAMILY MEDICINE | Facility: CLINIC | Age: 69
End: 2019-08-08

## 2019-08-08 DIAGNOSIS — F41.9 ANXIETY: ICD-10-CM

## 2019-09-15 ENCOUNTER — RECORDS - HEALTHEAST (OUTPATIENT)
Dept: ADMINISTRATIVE | Facility: OTHER | Age: 69
End: 2019-09-15

## 2019-09-15 ENCOUNTER — HOSPITAL ENCOUNTER (EMERGENCY)
Facility: CLINIC | Age: 69
Discharge: HOME | End: 2019-09-15
Attending: EMERGENCY MEDICINE | Admitting: EMERGENCY MEDICINE
Payer: COMMERCIAL

## 2019-09-15 VITALS
DIASTOLIC BLOOD PRESSURE: 69 MMHG | TEMPERATURE: 97.7 F | HEART RATE: 60 BPM | RESPIRATION RATE: 16 BRPM | BODY MASS INDEX: 26.46 KG/M2 | SYSTOLIC BLOOD PRESSURE: 126 MMHG | WEIGHT: 159 LBS | OXYGEN SATURATION: 100 %

## 2019-09-15 DIAGNOSIS — L53.9 REDNESS OF SKIN: ICD-10-CM

## 2019-09-15 PROCEDURE — 99284 EMERGENCY DEPT VISIT MOD MDM: CPT | Mod: Z6 | Performed by: EMERGENCY MEDICINE

## 2019-09-15 PROCEDURE — 99282 EMERGENCY DEPT VISIT SF MDM: CPT | Performed by: EMERGENCY MEDICINE

## 2019-09-15 RX ORDER — DOXYCYCLINE 100 MG/1
100 CAPSULE ORAL 2 TIMES DAILY
Qty: 28 CAPSULE | Refills: 0 | Status: SHIPPED | OUTPATIENT
Start: 2019-09-15 | End: 2019-09-29

## 2019-09-15 RX ORDER — CEPHALEXIN 500 MG/1
500 CAPSULE ORAL 4 TIMES DAILY
Qty: 40 CAPSULE | Refills: 0 | Status: SHIPPED | OUTPATIENT
Start: 2019-09-15 | End: 2019-09-25

## 2019-09-15 ASSESSMENT — ENCOUNTER SYMPTOMS
SHORTNESS OF BREATH: 0
FEVER: 0
COLOR CHANGE: 1
ABDOMINAL PAIN: 0
VOMITING: 0

## 2019-09-15 NOTE — ED PROVIDER NOTES
History     Chief Complaint   Patient presents with     Tick Removal     pt reported tick bite yesterday, left cheack, and left arm. pt was been diagnosed with lime disease 7/2018. redness on the sites noted. vs stable     HPI  Adalgisa Dennis is a 69 year old female who presents for concerns that she may have an infection from tick bites.  Patient mowed the lawn yesterday and yesterday evening noticed a small red area on the right forearm (not the left).  Then this morning she noticed redness and swelling just below the right eye in addition to the mild redness and swelling on the right forearm.  She denies eye symptoms, blurry vision or eye pain.  Denies fever.  The patient is concerned that she could have had tick bites that caused these areas of redness, but did not see a tick or remove a tick from herself.  14 months ago, patient had an erythema migrans rash and was treated for Lyme's disease.  She had been sick with an unknown illness for 2 years prior to that and continued to have the same symptoms from her prior illness.  Patient was treated with 4 full months of doxycycline and states that she is currently being treated with some herbal medicine for chronic Lyme's disease.  She tells me that her alternative medicine specialist has told her that she has several co-infections that she needs continued treatment for.  The patient cannot tell me the names of the co-infections, but they were found through blood tests.  Patient otherwise denies other symptoms, no fever, cough, chest pain, shortness of breath, abdominal pain, vomiting.    I have reviewed the Medications, Allergies, Past Medical and Surgical History, and Social History in the [employer] system.    Past Medical History:   Diagnosis Date     Abnormal glandular Papanicolaou smear of cervix 2002    yearly paps since 2003     Atrial fibrillation, unspecified type (H) 2005     Cyst of thyroid      Depressive disorder, not elsewhere classified      Genital  herpes, unspecified      Leiomyoma of uterus, unspecified        Past Surgical History:   Procedure Laterality Date     C APPENDECTOMY  1965     C NONSPECIFIC PROCEDURE  2002    Endometrial bx     ENT SURGERY      thyroidectomy       Family History   Problem Relation Age of Onset     Psychotic Disorder Mother         Mental Illness Schizophrenia, committed suicide at age 48     Depression Mother         suicide age 48, patient was age 20     Thyroid Disease Mother         questionable     Heart Disease Father         carotid endarterectomy     Cerebrovascular Disease Father         age 87     C.A.D. Father      Depression Sister         Dagmar     Cancer Paternal Uncle         Lung     Respiratory Maternal Uncle         Emphysema     Breast Cancer Sister         age 51     Anesthesia Reaction Paternal Grandmother      Arthritis Paternal Grandmother      Lipids Sister      Psychotic Disorder Sister        Social History     Tobacco Use     Smoking status: Former Smoker     Smokeless tobacco: Former User   Substance Use Topics     Alcohol use: Yes     Alcohol/week: 0.0 oz     Comment: Social ETOH         Review of Systems   Constitutional: Negative for fever.   Respiratory: Negative for shortness of breath.    Cardiovascular: Negative for chest pain.   Gastrointestinal: Negative for abdominal pain and vomiting.   Skin: Positive for color change.   All other systems reviewed and are negative.      Physical Exam   BP: 126/69  Pulse: 60  Temp: 97.7  F (36.5  C)  Resp: 16  Weight: 72.1 kg (159 lb)  SpO2: 100 %      Physical Exam    GEN:  Alert, well developed, no acute distress  HEENT:  PERRL, EOMI, Mucous membranes are moist.  No conjunctival injection.  There is erythema with mild swelling and induration of the skin overlying the right eyelid and inferior right orbit area.  I do not see a embedded tick.  There are no vesicles or excoriations.  No open wound.  Cardio:  RRR, no murmur, radial pulses equal bilaterally  PULM:   Lungs clear, good air movement, no wheezes, rales   Abd:  Soft, normal bowel sounds, no focal tenderness  Back exam:  No CVA tenderness  Musculoskeletal:  normal range of motion, no lower extremity swelling or calf tenderness  Neuro:  Alert and oriented X3, Follows commands, moving all extremities spontaneously   Skin:  Warm, dry, facial redness inferior to the right eye as noted above, the right forearm also has an area of mild swelling and erythema over the proximal ulna area.  No vesicles, no excoriations, no open wound, no embedded tick. The patient's back was checked for ticks and rash as well and no rash or wound or tick was seen on her back.   ED Course        Procedures           Critical Care time:  none  The patient is concerned that the 2 areas of redness may have a small tick in them, but I checked with magnification and do not see a tick in either area.  She could have other insect bites and have a localized reaction to them causing these 2 areas of redness. Cellulitis is possible as well, but having cellulitis in 2 different areas at the same time is not common.  No signs of orbital cellulitis.  The eye is not effected.   Labs Ordered and Resulted from Time of ED Arrival Up to the Time of Departure from the ED - No data to display         Assessments & Plan (with Medical Decision Making)   Patient presents 1 day after moving the lawn and has 2 areas of redness that I suspect may be from insect bites.  She, however, is concerned about reinfection with Lyme disease. I explained that the tick would have to be attached for 2-3 days to get Lyme from it, but she is concerned and does not believe the traditional knowledge that we have from the CDC regarding Lyme disease. I will treat for cellulitis with Keflex and for possible Lyme exposure with Doxycycline although I think her likelihood of re-infection with Lyme is very low.     I have reviewed the nursing notes.    I have reviewed the findings, diagnosis,  plan and need for follow up with the patient.    New Prescriptions    CEPHALEXIN (KEFLEX) 500 MG CAPSULE    Take 1 capsule (500 mg) by mouth 4 times daily for 10 days    DOXYCYCLINE HYCLATE (VIBRAMYCIN) 100 MG CAPSULE    Take 1 capsule (100 mg) by mouth 2 times daily for 14 days       Final diagnoses:   Redness of skin       9/15/2019   [facility], [place], EMERGENCY DEPARTMENT     Evi Monroy MD  09/15/19 0968

## 2019-09-15 NOTE — ED AVS SNAPSHOT
[facility], Emergency Department  [address]  [facility] [address]  Phone:  [phone]  Fax:  [phone]                                    Adalgisa Dennis   MRN: [id number]    Department:  [facility], Emergency Department   Date of Visit:  9/15/2019           After Visit Summary Signature Page    I have received my discharge instructions, and my questions have been answered. I have discussed any challenges I see with this plan with the nurse or doctor.    ..........................................................................................................................................  Patient/Patient Representative Signature      ..........................................................................................................................................  Patient Representative Print Name and Relationship to Patient    ..................................................               ................................................  Date                                   Time    ..........................................................................................................................................  Reviewed by Signature/Title    ...................................................              ..............................................  Date                                               Time          22EPIC Rev 08/18

## 2019-09-15 NOTE — DISCHARGE INSTRUCTIONS
Please make an appointment to follow up with Your Primary Care Provider in 2-3 days if not improving.

## 2019-09-15 NOTE — ED NOTES
"Writer called the pt to remind her the prescription, pt stated \" I was not going to take these medication any way\".   "

## 2019-10-02 ENCOUNTER — HEALTH MAINTENANCE LETTER (OUTPATIENT)
Age: 69
End: 2019-10-02

## 2019-10-02 ENCOUNTER — OFFICE VISIT - HEALTHEAST (OUTPATIENT)
Dept: FAMILY MEDICINE | Facility: CLINIC | Age: 69
End: 2019-10-02

## 2019-10-02 DIAGNOSIS — R53.82 CHRONIC FATIGUE: ICD-10-CM

## 2019-10-02 DIAGNOSIS — L29.9 ITCHY SCALP: ICD-10-CM

## 2019-10-02 ASSESSMENT — PATIENT HEALTH QUESTIONNAIRE - PHQ9: SUM OF ALL RESPONSES TO PHQ QUESTIONS 1-9: 3

## 2019-10-02 ASSESSMENT — MIFFLIN-ST. JEOR: SCORE: 1254.57

## 2019-10-07 ENCOUNTER — COMMUNICATION - HEALTHEAST (OUTPATIENT)
Dept: FAMILY MEDICINE | Facility: CLINIC | Age: 69
End: 2019-10-07

## 2019-10-10 ENCOUNTER — AMBULATORY - HEALTHEAST (OUTPATIENT)
Dept: LAB | Facility: CLINIC | Age: 69
End: 2019-10-10

## 2019-10-10 DIAGNOSIS — R53.83 FATIGUE: ICD-10-CM

## 2019-10-10 DIAGNOSIS — G47.00 INSOMNIA, UNSPECIFIED: ICD-10-CM

## 2019-10-10 DIAGNOSIS — G60.9 IDIOPATHIC PERIPHERAL NEUROPATHY: ICD-10-CM

## 2019-10-10 LAB
ALBUMIN SERPL-MCNC: 4.2 G/DL (ref 3.5–5)
ALP SERPL-CCNC: 89 U/L (ref 45–120)
ALT SERPL W P-5'-P-CCNC: 18 U/L (ref 0–45)
ANION GAP SERPL CALCULATED.3IONS-SCNC: 11 MMOL/L (ref 5–18)
AST SERPL W P-5'-P-CCNC: 20 U/L (ref 0–40)
BASOPHILS # BLD AUTO: 0.1 THOU/UL (ref 0–0.2)
BASOPHILS NFR BLD AUTO: 1 % (ref 0–2)
BILIRUB SERPL-MCNC: 0.3 MG/DL (ref 0–1)
BUN SERPL-MCNC: 26 MG/DL (ref 8–22)
CALCIUM SERPL-MCNC: 8.9 MG/DL (ref 8.5–10.5)
CHLORIDE BLD-SCNC: 100 MMOL/L (ref 98–107)
CO2 SERPL-SCNC: 24 MMOL/L (ref 22–31)
CREAT SERPL-MCNC: 0.68 MG/DL (ref 0.6–1.1)
EOSINOPHIL # BLD AUTO: 0.3 THOU/UL (ref 0–0.4)
EOSINOPHIL NFR BLD AUTO: 2 % (ref 0–6)
ERYTHROCYTE [DISTWIDTH] IN BLOOD BY AUTOMATED COUNT: 13.2 % (ref 11–14.5)
GFR SERPL CREATININE-BSD FRML MDRD: >60 ML/MIN/1.73M2
GLUCOSE BLD-MCNC: 94 MG/DL (ref 70–125)
HCT VFR BLD AUTO: 40.9 % (ref 35–47)
HGB BLD-MCNC: 13.6 G/DL (ref 12–16)
LYMPHOCYTES # BLD AUTO: 4.7 THOU/UL (ref 0.8–4.4)
LYMPHOCYTES NFR BLD AUTO: 43 % (ref 20–40)
MCH RBC QN AUTO: 31.6 PG (ref 27–34)
MCHC RBC AUTO-ENTMCNC: 33.3 G/DL (ref 32–36)
MCV RBC AUTO: 95 FL (ref 80–100)
MONOCYTES # BLD AUTO: 0.8 THOU/UL (ref 0–0.9)
MONOCYTES NFR BLD AUTO: 8 % (ref 2–10)
NEUTROPHILS # BLD AUTO: 5.1 THOU/UL (ref 2–7.7)
NEUTROPHILS NFR BLD AUTO: 46 % (ref 50–70)
PLATELET # BLD AUTO: 124 THOU/UL (ref 140–440)
PMV BLD AUTO: ABNORMAL FL
POTASSIUM BLD-SCNC: 4.9 MMOL/L (ref 3.5–5)
PROT SERPL-MCNC: 6.6 G/DL (ref 6–8)
RBC # BLD AUTO: 4.31 MILL/UL (ref 3.8–5.4)
SODIUM SERPL-SCNC: 135 MMOL/L (ref 136–145)
WBC: 11.1 THOU/UL (ref 4–11)

## 2019-10-11 ENCOUNTER — COMMUNICATION - HEALTHEAST (OUTPATIENT)
Dept: FAMILY MEDICINE | Facility: CLINIC | Age: 69
End: 2019-10-11

## 2019-10-15 ENCOUNTER — AMBULATORY - HEALTHEAST (OUTPATIENT)
Dept: LAB | Facility: CLINIC | Age: 69
End: 2019-10-15

## 2019-10-15 DIAGNOSIS — R53.82 CHRONIC FATIGUE: ICD-10-CM

## 2019-10-17 ENCOUNTER — COMMUNICATION - HEALTHEAST (OUTPATIENT)
Dept: FAMILY MEDICINE | Facility: CLINIC | Age: 69
End: 2019-10-17

## 2019-10-17 LAB
CORTIS SAL-MCNC: 0.05 UG/DL
CORTIS SAL-MCNC: 0.06 UG/DL
CORTIS SAL-MCNC: 0.08 UG/DL
CORTIS SAL-MCNC: 0.68 UG/DL

## 2019-11-07 ENCOUNTER — AMBULATORY - HEALTHEAST (OUTPATIENT)
Dept: LAB | Facility: CLINIC | Age: 69
End: 2019-11-07

## 2019-11-07 DIAGNOSIS — G93.32 CHRONIC FATIGUE SYNDROME: ICD-10-CM

## 2019-11-07 LAB
T3FREE SERPL-MCNC: 2 PG/ML (ref 1.9–3.9)
T4 FREE SERPL-MCNC: 1.1 NG/DL (ref 0.7–1.8)
THYROID PEROXIDASE ANTIBODIES - HISTORICAL: <3 IU/ML (ref 0–5.6)
TSH SERPL DL<=0.005 MIU/L-ACNC: 5.22 UIU/ML (ref 0.3–5)

## 2019-11-11 LAB
AC/FC RATIO: 0.2 (ref 0.1–0.8)
ACYLCARNITINE (AC): 9 NMOL/ML (ref 5–30)
FREE (FC): 40 NMOL/ML (ref 25–54)
INTERPRETATION: NORMAL
TOTAL: 49 NMOL/ML (ref 34–78)
TSI ACT/NOR SER: 104 %

## 2019-11-13 ENCOUNTER — COMMUNICATION - HEALTHEAST (OUTPATIENT)
Dept: FAMILY MEDICINE | Facility: CLINIC | Age: 69
End: 2019-11-13

## 2019-11-13 DIAGNOSIS — E03.9 ACQUIRED HYPOTHYROIDISM: ICD-10-CM

## 2019-11-13 LAB — ACHR BIND IGG+IGM SER IA-SCNC: 0 NMOL/L

## 2019-12-09 ENCOUNTER — COMMUNICATION - HEALTHEAST (OUTPATIENT)
Dept: FAMILY MEDICINE | Facility: CLINIC | Age: 69
End: 2019-12-09

## 2019-12-09 DIAGNOSIS — E03.9 ACQUIRED HYPOTHYROIDISM: ICD-10-CM

## 2019-12-15 ENCOUNTER — HEALTH MAINTENANCE LETTER (OUTPATIENT)
Age: 69
End: 2019-12-15

## 2020-01-16 ENCOUNTER — COMMUNICATION - HEALTHEAST (OUTPATIENT)
Dept: FAMILY MEDICINE | Facility: CLINIC | Age: 70
End: 2020-01-16

## 2020-01-16 DIAGNOSIS — E03.9 HYPOTHYROIDISM: ICD-10-CM

## 2020-01-21 ENCOUNTER — COMMUNICATION - HEALTHEAST (OUTPATIENT)
Dept: FAMILY MEDICINE | Facility: CLINIC | Age: 70
End: 2020-01-21

## 2020-01-21 DIAGNOSIS — E03.9 HYPOTHYROIDISM: ICD-10-CM

## 2020-02-03 ENCOUNTER — OFFICE VISIT - HEALTHEAST (OUTPATIENT)
Dept: FAMILY MEDICINE | Facility: CLINIC | Age: 70
End: 2020-02-03

## 2020-02-03 DIAGNOSIS — Z71.84 TRAVEL ADVICE ENCOUNTER: ICD-10-CM

## 2020-02-03 DIAGNOSIS — F41.9 ANXIETY: ICD-10-CM

## 2020-02-03 DIAGNOSIS — I48.0 PAROXYSMAL ATRIAL FIBRILLATION (H): ICD-10-CM

## 2020-02-05 ENCOUNTER — COMMUNICATION - HEALTHEAST (OUTPATIENT)
Dept: FAMILY MEDICINE | Facility: CLINIC | Age: 70
End: 2020-02-05

## 2020-02-05 DIAGNOSIS — R69 TRAVEL-RELATED ILLNESS: ICD-10-CM

## 2020-02-17 ENCOUNTER — OFFICE VISIT - HEALTHEAST (OUTPATIENT)
Dept: FAMILY MEDICINE | Facility: CLINIC | Age: 70
End: 2020-02-17

## 2020-02-17 DIAGNOSIS — Z00.00 ROUTINE GENERAL MEDICAL EXAMINATION AT A HEALTH CARE FACILITY: ICD-10-CM

## 2020-02-17 DIAGNOSIS — E03.9 ACQUIRED HYPOTHYROIDISM: ICD-10-CM

## 2020-02-17 DIAGNOSIS — E55.9 VITAMIN D DEFICIENCY: ICD-10-CM

## 2020-02-17 DIAGNOSIS — L72.3 SEBACEOUS CYST: ICD-10-CM

## 2020-02-17 DIAGNOSIS — D50.8 OTHER IRON DEFICIENCY ANEMIA: ICD-10-CM

## 2020-02-17 DIAGNOSIS — R53.81 MALAISE: ICD-10-CM

## 2020-02-17 LAB
BASOPHILS # BLD AUTO: 0.1 THOU/UL (ref 0–0.2)
BASOPHILS NFR BLD AUTO: 1 % (ref 0–2)
EOSINOPHIL # BLD AUTO: 0.2 THOU/UL (ref 0–0.4)
EOSINOPHIL NFR BLD AUTO: 3 % (ref 0–6)
ERYTHROCYTE [DISTWIDTH] IN BLOOD BY AUTOMATED COUNT: 13.2 % (ref 11–14.5)
HCT VFR BLD AUTO: 37.3 % (ref 35–47)
HGB BLD-MCNC: 11.8 G/DL (ref 12–16)
LYMPHOCYTES # BLD AUTO: 4 THOU/UL (ref 0.8–4.4)
LYMPHOCYTES NFR BLD AUTO: 46 % (ref 20–40)
MCH RBC QN AUTO: 30.8 PG (ref 27–34)
MCHC RBC AUTO-ENTMCNC: 31.6 G/DL (ref 32–36)
MCV RBC AUTO: 97 FL (ref 80–100)
MONOCYTES # BLD AUTO: 0.6 THOU/UL (ref 0–0.9)
MONOCYTES NFR BLD AUTO: 7 % (ref 2–10)
NEUTROPHILS # BLD AUTO: 3.7 THOU/UL (ref 2–7.7)
NEUTROPHILS NFR BLD AUTO: 43 % (ref 50–70)
PLATELET # BLD AUTO: 123 THOU/UL (ref 140–440)
PMV BLD AUTO: ABNORMAL FL
RBC # BLD AUTO: 3.83 MILL/UL (ref 3.8–5.4)
WBC: 8.7 THOU/UL (ref 4–11)

## 2020-02-17 ASSESSMENT — MIFFLIN-ST. JEOR: SCORE: 1264.78

## 2020-02-17 ASSESSMENT — PATIENT HEALTH QUESTIONNAIRE - PHQ9: SUM OF ALL RESPONSES TO PHQ QUESTIONS 1-9: 5

## 2020-02-18 ENCOUNTER — AMBULATORY - HEALTHEAST (OUTPATIENT)
Dept: FAMILY MEDICINE | Facility: CLINIC | Age: 70
End: 2020-02-18

## 2020-02-18 DIAGNOSIS — D50.8 OTHER IRON DEFICIENCY ANEMIA: ICD-10-CM

## 2020-02-18 LAB
ALBUMIN SERPL-MCNC: 4.1 G/DL (ref 3.5–5)
ALP SERPL-CCNC: 58 U/L (ref 45–120)
ALT SERPL W P-5'-P-CCNC: 18 U/L (ref 0–45)
ANION GAP SERPL CALCULATED.3IONS-SCNC: 10 MMOL/L (ref 5–18)
AST SERPL W P-5'-P-CCNC: 18 U/L (ref 0–40)
BILIRUB SERPL-MCNC: 0.3 MG/DL (ref 0–1)
BUN SERPL-MCNC: 20 MG/DL (ref 8–22)
CALCIUM SERPL-MCNC: 8.6 MG/DL (ref 8.5–10.5)
CHLORIDE BLD-SCNC: 105 MMOL/L (ref 98–107)
CO2 SERPL-SCNC: 23 MMOL/L (ref 22–31)
CREAT SERPL-MCNC: 0.64 MG/DL (ref 0.6–1.1)
GFR SERPL CREATININE-BSD FRML MDRD: >60 ML/MIN/1.73M2
GLUCOSE BLD-MCNC: 83 MG/DL (ref 70–125)
IRON SATN MFR SERPL: 34 % (ref 20–50)
IRON SERPL-MCNC: 84 UG/DL (ref 42–175)
POTASSIUM BLD-SCNC: 4 MMOL/L (ref 3.5–5)
PROT SERPL-MCNC: 6.1 G/DL (ref 6–8)
SODIUM SERPL-SCNC: 138 MMOL/L (ref 136–145)
T3FREE SERPL-MCNC: 1.1 PG/ML (ref 1.9–3.9)
T4 FREE SERPL-MCNC: 0.9 NG/DL (ref 0.7–1.8)
TIBC SERPL-MCNC: 249 UG/DL (ref 313–563)
TRANSFERRIN SERPL-MCNC: 199 MG/DL (ref 212–360)
TSH SERPL DL<=0.005 MIU/L-ACNC: 16.07 UIU/ML (ref 0.3–5)

## 2020-02-19 ENCOUNTER — COMMUNICATION - HEALTHEAST (OUTPATIENT)
Dept: FAMILY MEDICINE | Facility: CLINIC | Age: 70
End: 2020-02-19

## 2020-02-19 LAB — 25(OH)D3 SERPL-MCNC: 56.6 NG/ML (ref 30–80)

## 2020-02-20 ENCOUNTER — COMMUNICATION - HEALTHEAST (OUTPATIENT)
Dept: FAMILY MEDICINE | Facility: CLINIC | Age: 70
End: 2020-02-20

## 2020-02-20 DIAGNOSIS — R19.7 DIARRHEA OF PRESUMED INFECTIOUS ORIGIN: ICD-10-CM

## 2020-02-21 ENCOUNTER — COMMUNICATION - HEALTHEAST (OUTPATIENT)
Dept: FAMILY MEDICINE | Facility: CLINIC | Age: 70
End: 2020-02-21

## 2020-02-24 ENCOUNTER — AMBULATORY - HEALTHEAST (OUTPATIENT)
Dept: LAB | Facility: CLINIC | Age: 70
End: 2020-02-24

## 2020-02-24 ENCOUNTER — AMBULATORY - HEALTHEAST (OUTPATIENT)
Dept: FAMILY MEDICINE | Facility: CLINIC | Age: 70
End: 2020-02-24

## 2020-02-24 DIAGNOSIS — R19.7 DIARRHEA OF PRESUMED INFECTIOUS ORIGIN: ICD-10-CM

## 2020-02-24 DIAGNOSIS — E03.9 ACQUIRED HYPOTHYROIDISM: ICD-10-CM

## 2020-02-24 DIAGNOSIS — R53.82 CHRONIC FATIGUE: ICD-10-CM

## 2020-02-25 ENCOUNTER — COMMUNICATION - HEALTHEAST (OUTPATIENT)
Dept: FAMILY MEDICINE | Facility: CLINIC | Age: 70
End: 2020-02-25

## 2020-02-25 LAB
H PYLORI AG STL QL IA: POSITIVE
SHIGA TOXIN 1: NEGATIVE
SHIGA TOXIN 2: NEGATIVE

## 2020-02-27 ENCOUNTER — AMBULATORY - HEALTHEAST (OUTPATIENT)
Dept: LAB | Facility: CLINIC | Age: 70
End: 2020-02-27

## 2020-02-27 DIAGNOSIS — E03.9 ACQUIRED HYPOTHYROIDISM: ICD-10-CM

## 2020-02-27 DIAGNOSIS — R53.82 CHRONIC FATIGUE: ICD-10-CM

## 2020-02-27 LAB
BASOPHILS # BLD AUTO: 0.1 THOU/UL (ref 0–0.2)
BASOPHILS NFR BLD AUTO: 1 % (ref 0–2)
EOSINOPHIL # BLD AUTO: 0.2 THOU/UL (ref 0–0.4)
EOSINOPHIL NFR BLD AUTO: 2 % (ref 0–6)
ERYTHROCYTE [DISTWIDTH] IN BLOOD BY AUTOMATED COUNT: 13.2 % (ref 11–14.5)
HCT VFR BLD AUTO: 36.5 % (ref 35–47)
HGB BLD-MCNC: 12.2 G/DL (ref 12–16)
LYMPHOCYTES # BLD AUTO: 3.7 THOU/UL (ref 0.8–4.4)
LYMPHOCYTES NFR BLD AUTO: 37 % (ref 20–40)
MCH RBC QN AUTO: 31 PG (ref 27–34)
MCHC RBC AUTO-ENTMCNC: 33.4 G/DL (ref 32–36)
MCV RBC AUTO: 93 FL (ref 80–100)
MONOCYTES # BLD AUTO: 0.8 THOU/UL (ref 0–0.9)
MONOCYTES NFR BLD AUTO: 8 % (ref 2–10)
NEUTROPHILS # BLD AUTO: 5 THOU/UL (ref 2–7.7)
NEUTROPHILS NFR BLD AUTO: 51 % (ref 50–70)
PLATELET # BLD AUTO: 119 THOU/UL (ref 140–440)
PMV BLD AUTO: ABNORMAL FL
RBC # BLD AUTO: 3.93 MILL/UL (ref 3.8–5.4)
T3FREE SERPL-MCNC: 1.5 PG/ML (ref 1.9–3.9)
T4 FREE SERPL-MCNC: 1 NG/DL (ref 0.7–1.8)
TSH SERPL DL<=0.005 MIU/L-ACNC: 11.1 UIU/ML (ref 0.3–5)
WBC: 9.9 THOU/UL (ref 4–11)

## 2020-02-28 ENCOUNTER — COMMUNICATION - HEALTHEAST (OUTPATIENT)
Dept: FAMILY MEDICINE | Facility: CLINIC | Age: 70
End: 2020-02-28

## 2020-02-28 LAB — BACTERIA SPEC CULT: NORMAL

## 2020-03-02 ENCOUNTER — COMMUNICATION - HEALTHEAST (OUTPATIENT)
Dept: FAMILY MEDICINE | Facility: CLINIC | Age: 70
End: 2020-03-02

## 2020-03-02 DIAGNOSIS — E03.9 ACQUIRED HYPOTHYROIDISM: ICD-10-CM

## 2020-03-02 DIAGNOSIS — K92.9 DIGESTIVE DISORDER: ICD-10-CM

## 2020-03-10 ENCOUNTER — AMBULATORY - HEALTHEAST (OUTPATIENT)
Dept: LAB | Facility: CLINIC | Age: 70
End: 2020-03-10

## 2020-03-10 DIAGNOSIS — K92.9 DIGESTIVE DISORDER: ICD-10-CM

## 2020-03-10 DIAGNOSIS — E03.9 ACQUIRED HYPOTHYROIDISM: ICD-10-CM

## 2020-03-10 LAB
T3FREE SERPL-MCNC: 1.7 PG/ML (ref 1.9–3.9)
T4 FREE SERPL-MCNC: 1.1 NG/DL (ref 0.7–1.8)
TSH SERPL DL<=0.005 MIU/L-ACNC: 5.92 UIU/ML (ref 0.3–5)

## 2020-03-12 LAB — H PYLORI AG STL QL IA: NEGATIVE

## 2020-03-16 ENCOUNTER — COMMUNICATION - HEALTHEAST (OUTPATIENT)
Dept: FAMILY MEDICINE | Facility: CLINIC | Age: 70
End: 2020-03-16

## 2020-03-16 DIAGNOSIS — E03.9 HYPOTHYROIDISM: ICD-10-CM

## 2020-04-08 ENCOUNTER — COMMUNICATION - HEALTHEAST (OUTPATIENT)
Dept: FAMILY MEDICINE | Facility: CLINIC | Age: 70
End: 2020-04-08

## 2020-04-08 DIAGNOSIS — E03.9 HYPOTHYROIDISM: ICD-10-CM

## 2020-04-22 ENCOUNTER — COMMUNICATION - HEALTHEAST (OUTPATIENT)
Dept: FAMILY MEDICINE | Facility: CLINIC | Age: 70
End: 2020-04-22

## 2020-05-08 ENCOUNTER — COMMUNICATION - HEALTHEAST (OUTPATIENT)
Dept: FAMILY MEDICINE | Facility: CLINIC | Age: 70
End: 2020-05-08

## 2020-05-08 DIAGNOSIS — E03.9 ACQUIRED HYPOTHYROIDISM: ICD-10-CM

## 2020-05-21 ENCOUNTER — AMBULATORY - HEALTHEAST (OUTPATIENT)
Dept: LAB | Facility: CLINIC | Age: 70
End: 2020-05-21

## 2020-05-21 DIAGNOSIS — E03.9 ACQUIRED HYPOTHYROIDISM: ICD-10-CM

## 2020-05-21 LAB
T3FREE SERPL-MCNC: 2.3 PG/ML (ref 1.9–3.9)
T4 FREE SERPL-MCNC: 0.9 NG/DL (ref 0.7–1.8)
TSH SERPL DL<=0.005 MIU/L-ACNC: 6.05 UIU/ML (ref 0.3–5)

## 2020-05-26 ENCOUNTER — COMMUNICATION - HEALTHEAST (OUTPATIENT)
Dept: FAMILY MEDICINE | Facility: CLINIC | Age: 70
End: 2020-05-26

## 2020-05-28 ENCOUNTER — ANCILLARY PROCEDURE (OUTPATIENT)
Dept: MAMMOGRAPHY | Facility: CLINIC | Age: 70
End: 2020-05-28
Attending: FAMILY MEDICINE
Payer: COMMERCIAL

## 2020-05-28 ENCOUNTER — RECORDS - HEALTHEAST (OUTPATIENT)
Dept: ADMINISTRATIVE | Facility: OTHER | Age: 70
End: 2020-05-28

## 2020-05-28 DIAGNOSIS — Z12.31 VISIT FOR SCREENING MAMMOGRAM: ICD-10-CM

## 2020-06-05 ENCOUNTER — COMMUNICATION - HEALTHEAST (OUTPATIENT)
Dept: FAMILY MEDICINE | Facility: CLINIC | Age: 70
End: 2020-06-05

## 2020-06-10 ENCOUNTER — RECORDS - HEALTHEAST (OUTPATIENT)
Dept: ADMINISTRATIVE | Facility: OTHER | Age: 70
End: 2020-06-10

## 2020-06-29 ENCOUNTER — COMMUNICATION - HEALTHEAST (OUTPATIENT)
Dept: FAMILY MEDICINE | Facility: CLINIC | Age: 70
End: 2020-06-29

## 2020-06-29 DIAGNOSIS — E03.9 ACQUIRED HYPOTHYROIDISM: ICD-10-CM

## 2020-07-08 ENCOUNTER — COMMUNICATION - HEALTHEAST (OUTPATIENT)
Dept: FAMILY MEDICINE | Facility: CLINIC | Age: 70
End: 2020-07-08

## 2020-07-22 ENCOUNTER — COMMUNICATION - HEALTHEAST (OUTPATIENT)
Dept: FAMILY MEDICINE | Facility: CLINIC | Age: 70
End: 2020-07-22

## 2020-07-22 DIAGNOSIS — Z11.59 ENCOUNTER FOR SCREENING FOR OTHER VIRAL DISEASES: Primary | ICD-10-CM

## 2020-07-22 DIAGNOSIS — Z12.11 SCREEN FOR COLON CANCER: ICD-10-CM

## 2020-08-09 DIAGNOSIS — Z11.59 ENCOUNTER FOR SCREENING FOR OTHER VIRAL DISEASES: ICD-10-CM

## 2020-08-09 LAB
LABORATORY COMMENT REPORT: NORMAL
SARS-COV-2 RNA SPEC QL NAA+PROBE: NEGATIVE
SARS-COV-2 RNA SPEC QL NAA+PROBE: NORMAL
SPECIMEN SOURCE: NORMAL
SPECIMEN SOURCE: NORMAL

## 2020-08-11 ENCOUNTER — HOSPITAL ENCOUNTER (OUTPATIENT)
Facility: CLINIC | Age: 70
Discharge: HOME | End: 2020-08-11
Attending: SPECIALIST | Admitting: SPECIALIST
Payer: COMMERCIAL

## 2020-08-11 ENCOUNTER — RECORDS - HEALTHEAST (OUTPATIENT)
Dept: ADMINISTRATIVE | Facility: OTHER | Age: 70
End: 2020-08-11

## 2020-08-11 VITALS
HEIGHT: 65 IN | OXYGEN SATURATION: 100 % | HEART RATE: 52 BPM | DIASTOLIC BLOOD PRESSURE: 89 MMHG | TEMPERATURE: 98.1 F | SYSTOLIC BLOOD PRESSURE: 103 MMHG | RESPIRATION RATE: 8 BRPM | BODY MASS INDEX: 24.16 KG/M2 | WEIGHT: 145 LBS

## 2020-08-11 LAB — COLONOSCOPY: NORMAL

## 2020-08-11 PROCEDURE — 45378 DIAGNOSTIC COLONOSCOPY: CPT | Performed by: SPECIALIST

## 2020-08-11 PROCEDURE — 99153 MOD SED SAME PHYS/QHP EA: CPT

## 2020-08-11 PROCEDURE — 25000128 H RX IP 250 OP 636: Performed by: SPECIALIST

## 2020-08-11 PROCEDURE — G0105 COLORECTAL SCRN; HI RISK IND: HCPCS | Performed by: SPECIALIST

## 2020-08-11 PROCEDURE — G0500 MOD SEDAT ENDO SERVICE >5YRS: HCPCS | Performed by: SPECIALIST

## 2020-08-11 RX ORDER — ONDANSETRON 2 MG/ML
4 INJECTION INTRAMUSCULAR; INTRAVENOUS
Status: DISCONTINUED | OUTPATIENT
Start: 2020-08-11 | End: 2020-08-11 | Stop reason: HOSPADM

## 2020-08-11 RX ORDER — LIDOCAINE 40 MG/G
CREAM TOPICAL
Status: DISCONTINUED | OUTPATIENT
Start: 2020-08-11 | End: 2020-08-11 | Stop reason: HOSPADM

## 2020-08-11 RX ORDER — FENTANYL CITRATE 50 UG/ML
INJECTION, SOLUTION INTRAMUSCULAR; INTRAVENOUS PRN
Status: DISCONTINUED | OUTPATIENT
Start: 2020-08-11 | End: 2020-08-11 | Stop reason: HOSPADM

## 2020-08-11 ASSESSMENT — MIFFLIN-ST. JEOR: SCORE: 1183.6

## 2020-08-19 ENCOUNTER — OFFICE VISIT - HEALTHEAST (OUTPATIENT)
Dept: FAMILY MEDICINE | Facility: CLINIC | Age: 70
End: 2020-08-19
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
## 2020-08-19 DIAGNOSIS — S30.860A INSECT BITE OF LOWER BACK, INITIAL ENCOUNTER: ICD-10-CM

## 2020-08-19 DIAGNOSIS — A69.22 LYME NEUROPATHY: ICD-10-CM

## 2020-08-19 ASSESSMENT — PATIENT HEALTH QUESTIONNAIRE - PHQ9: SUM OF ALL RESPONSES TO PHQ QUESTIONS 1-9: 3

## 2020-08-25 ENCOUNTER — RECORDS - HEALTHEAST (OUTPATIENT)
Dept: ADMINISTRATIVE | Facility: OTHER | Age: 70
End: 2020-08-25

## 2020-10-02 ENCOUNTER — COMMUNICATION - HEALTHEAST (OUTPATIENT)
Dept: FAMILY MEDICINE | Facility: CLINIC | Age: 70
End: 2020-10-02

## 2020-10-02 DIAGNOSIS — E03.9 ACQUIRED HYPOTHYROIDISM: ICD-10-CM

## 2020-10-23 ENCOUNTER — VIRTUAL VISIT (OUTPATIENT)
Dept: FAMILY MEDICINE | Facility: OTHER | Age: 70
End: 2020-10-23
Payer: COMMERCIAL

## 2020-10-23 DIAGNOSIS — Z20.822 SUSPECTED COVID-19 VIRUS INFECTION: ICD-10-CM

## 2020-10-23 DIAGNOSIS — Z20.822 SUSPECTED COVID-19 VIRUS INFECTION: Primary | ICD-10-CM

## 2020-10-23 LAB
SARS-COV-2 RNA SPEC QL NAA+PROBE: NOT DETECTED
SPECIMEN SOURCE: NORMAL

## 2020-10-23 PROCEDURE — U0003 INFECTIOUS AGENT DETECTION BY NUCLEIC ACID (DNA OR RNA); SEVERE ACUTE RESPIRATORY SYNDROME CORONAVIRUS 2 (SARS-COV-2) (CORONAVIRUS DISEASE [COVID-19]), AMPLIFIED PROBE TECHNIQUE, MAKING USE OF HIGH THROUGHPUT TECHNOLOGIES AS DESCRIBED BY CMS-2020-01-R: HCPCS | Performed by: FAMILY MEDICINE

## 2020-10-23 PROCEDURE — 99421 OL DIG E/M SVC 5-10 MIN: CPT | Performed by: NURSE PRACTITIONER

## 2020-10-23 NOTE — PROGRESS NOTES
"Date: 10/23/2020 08:37:41  Clinician: Komal Diaz  Clinician NPI: [id number]  Patient: Adalgisa Dennis  Patient : 1950  Patient Address: [address], [place] [address]  Patient Phone: [phone]  Visit Protocol: URI  Patient Summary:  Adalgisa is a 70 year old ( : 1950 ) female who initiated a [facility] Visit for COVID-19 (Coronavirus) evaluation and screening. When asked the question \"Please sign me up to receive news, health information and promotions from [facility].\", Adalgisa responded \"No\".    When asked when her symptoms started, Adalgisa reported that she does not have any symptoms.   She denies taking antibiotic medication in the past month and having recent facial or sinus surgery in the past 60 days.    Pertinent COVID-19 (Coronavirus) information  In the past 14 days, Adalgisa has not worked in a congregate living setting.   She does not work or volunteer as healthcare worker or a  and does not work or volunteer in a healthcare facility.   Adalgisa also has not lived in a congregate living setting in the past 14 days. She does not live with a healthcare worker.   Adalgisa has had a close contact with a laboratory-confirmed COVID-19 patient in the last 14 days. Additional information about contact with COVID-19 (Coronavirus) patient as reported by the patient (free text): Casey Acevedo. We were at an outside cafe table more than six feet apart on a very windy day. As we were drinking coffee we did not wear masks.   Patient reported they are not living in the same household with a COVID-19 positive patient.  Patient denies being in an enclosed space for greater than 15 minutes with a COVID-19 patient.  Since 2019, Adalgisa and has not had upper respiratory infection or influenza-like illness. Has not been diagnosed with lab-confirmed COVID-19 test   Pertinent medical history  Adalgisa does not get yeast infections when she takes antibiotics.   " Adalgisa does not need a return to work/school note.   Weight: 141 lbs   Adalgisa does not smoke or use smokeless tobacco.   Additional information as reported by the patient (free text): I have chronic Lymes disease and am in treatment for a major mold infection   Weight: 141 lbs    MEDICATIONS: Eliquis oral, levothyroxine oral, ALLERGIES: Sulfa (Sulfonamide Antibiotics)  Clinician Response:  Dear Adalgisa,   Based on your exposure to COVID-19 (coronavirus), we would like to test you for this virus.  1. Please call [phone] to schedule your visit. Explain that you were referred by [facility] to have a COVID-19 test. Be ready to [facility].  The following will serve as your written order for this COVID Test, ordered by me, for the indication of suspected COVID [Z20.828]: The test will be ordered in [email or web address], our electronic health record, after you are scheduled. It will show as ordered and authorized by Abhijeet Thapa MD.  Order: COVID-19 (coronavirus) PCR for ASYMPTOMATIC EXPOSURE testing from [facility].  If you know you have had close contact with someone who tested positive, you should be quarantined for 14 days after this exposure. You should stay in quarantine for the14 days even if the covid test is negative, the optimal time to test after exposure is 5-7 days from the exposure  Quarantine means   What should I do?  For safety, it's very important to follow these rules. Do this for 14 days after the date you were last exposed to the virus..  Stay home and away from others. Don't go to school or anywhere else. Generally quarantine means staying home from work but there are some exceptions to this. Please contact your workplace.   No hugging, kissing or shaking hands.  Don't let anyone visit.  Cover your mouth and nose with a mask, tissue or washcloth to avoid spreading germs.  Wash your hands and face often. Use soap and water.  What are the symptoms of COVID-19?  The most common symptoms are  cough, fever and trouble breathing. Less common symptoms include headache, body aches, fatigue (feeling very tired), chills, sore throat, stuffy or runny nose, diarrhea (loose poop), loss of taste or smell, belly pain, and nausea or vomiting (feeling sick to your stomach or throwing up).  After 14 days, if you have still don't have symptoms, you likely don't have this virus.  If you develop symptoms, follow these guidelines.  If you're normally healthy: Please start another [facility] visit to report your symptoms. Go to [email or web address].  If you have a serious health problem (like cancer, heart failure, an organ transplant or kidney disease): Call your specialty clinic. Let them know that you might have COVID-19.  2. When it's time for your COVID test:  Stay at least 6 feet away from others. (If someone will drive you to your test, stay in the backseat, as far away from the  as you can.)  Cover your mouth and nose with a mask, tissue or washcloth.  Go straight to the testing site. Don't make any stops on the way there or back.  Please note  Caregivers in these groups are at risk for severe illness due to COVID-19:  o People 65 years and older  o People who live in a nursing home or long-term care facility  o People with chronic disease (lung, heart, cancer, diabetes, kidney, liver, immunologic)  o People who have a weakened immune system, including those who:  Are in cancer treatment  Take medicine that weakens the immune system, such as corticosteroids  Had a bone marrow or organ transplant  Have an immune deficiency  Have poorly controlled HIV or AIDS  Are obese (body mass index of 40 or higher)  Smoke regularly  Where can I get more information?   [facility] -- About COVID-19: [email or web address]  CDC -- What to Do If You're Sick: www.cdc.gov/coronavirus/2019-ncov/about/steps-when-sick.html  CDC -- Ending Home Isolation: www.cdc.gov/coronavirus/2019-ncov/hcp/disposition-in-home-patients.html  [facility]  -- Caring for Someone: www.cdc.gov/coronavirus/2019-ncov/if-you-are-sick/care-for-someone.html  [facility] -- Interim Guidance for Hospital Discharge to Home: www.health.[facility].[place]/diseases/coronavirus/hcp/hospdischarge.pdf  [facility] clinical trials (COVID-19 research studies): [email or web address]-clinical-trials  Below are the COVID-19 hotlines at the [place] Department of Health ([facility]). Interpreters are available.  For health questions: Call 897-805-2612 or 1-359.814.9631 (7 a.m. to 7 p.m.)  For questions about schools and childcare: Call 408-926-7477 or 1-502.975.9101 (7 a.m. to 7 p.m.)    Diagnosis: Contact with and (suspected) exposure to other viral communicable diseases  Diagnosis ICD: Z20.828

## 2020-10-25 ENCOUNTER — COMMUNICATION - HEALTHEAST (OUTPATIENT)
Dept: FAMILY MEDICINE | Facility: CLINIC | Age: 70
End: 2020-10-25

## 2020-10-27 ENCOUNTER — AMBULATORY - HEALTHEAST (OUTPATIENT)
Dept: LAB | Facility: CLINIC | Age: 70
End: 2020-10-27

## 2020-10-27 DIAGNOSIS — E03.9 ACQUIRED HYPOTHYROIDISM: ICD-10-CM

## 2020-10-27 LAB
T4 FREE SERPL-MCNC: 0.9 NG/DL (ref 0.7–1.8)
TSH SERPL DL<=0.005 MIU/L-ACNC: 20.11 UIU/ML (ref 0.3–5)

## 2020-10-28 ENCOUNTER — COMMUNICATION - HEALTHEAST (OUTPATIENT)
Dept: FAMILY MEDICINE | Facility: CLINIC | Age: 70
End: 2020-10-28

## 2020-10-28 ENCOUNTER — OFFICE VISIT - HEALTHEAST (OUTPATIENT)
Dept: FAMILY MEDICINE | Facility: CLINIC | Age: 70
End: 2020-10-28

## 2020-10-28 DIAGNOSIS — J32.9 CHRONIC SINUSITIS, UNSPECIFIED LOCATION: ICD-10-CM

## 2020-10-28 DIAGNOSIS — E03.9 ACQUIRED HYPOTHYROIDISM: ICD-10-CM

## 2020-11-03 ENCOUNTER — COMMUNICATION - HEALTHEAST (OUTPATIENT)
Dept: FAMILY MEDICINE | Facility: CLINIC | Age: 70
End: 2020-11-03

## 2020-12-03 ENCOUNTER — RECORDS - HEALTHEAST (OUTPATIENT)
Dept: ADMINISTRATIVE | Facility: OTHER | Age: 70
End: 2020-12-03

## 2020-12-10 ENCOUNTER — COMMUNICATION - HEALTHEAST (OUTPATIENT)
Dept: FAMILY MEDICINE | Facility: CLINIC | Age: 70
End: 2020-12-10

## 2020-12-10 ENCOUNTER — AMBULATORY - HEALTHEAST (OUTPATIENT)
Dept: LAB | Facility: CLINIC | Age: 70
End: 2020-12-10

## 2020-12-10 DIAGNOSIS — E03.9 ACQUIRED HYPOTHYROIDISM: ICD-10-CM

## 2020-12-10 LAB
T3FREE SERPL-MCNC: 1.6 PG/ML (ref 1.9–3.9)
T4 FREE SERPL-MCNC: 1 NG/DL (ref 0.7–1.8)
TSH SERPL DL<=0.005 MIU/L-ACNC: 15.19 UIU/ML (ref 0.3–5)

## 2021-01-09 ENCOUNTER — OFFICE VISIT (OUTPATIENT)
Dept: URGENT CARE | Facility: URGENT CARE | Age: 71
End: 2021-01-09
Payer: COMMERCIAL

## 2021-01-09 VITALS — BODY MASS INDEX: 22.99 KG/M2 | WEIGHT: 138 LBS | HEIGHT: 65 IN

## 2021-01-09 DIAGNOSIS — R82.90 NONSPECIFIC FINDING ON EXAMINATION OF URINE: ICD-10-CM

## 2021-01-09 DIAGNOSIS — R30.0 DYSURIA: ICD-10-CM

## 2021-01-09 DIAGNOSIS — N39.0 ACUTE UTI: Primary | ICD-10-CM

## 2021-01-09 LAB
ALBUMIN UR-MCNC: ABNORMAL MG/DL
APPEARANCE UR: CLEAR
BACTERIA #/AREA URNS HPF: ABNORMAL /HPF
BILIRUB UR QL STRIP: NEGATIVE
COLOR UR AUTO: YELLOW
GLUCOSE UR STRIP-MCNC: NEGATIVE MG/DL
HGB UR QL STRIP: ABNORMAL
KETONES UR STRIP-MCNC: NEGATIVE MG/DL
LEUKOCYTE ESTERASE UR QL STRIP: ABNORMAL
NITRATE UR QL: NEGATIVE
PH UR STRIP: 6 PH (ref 5–7)
RBC #/AREA URNS AUTO: >100 /HPF
SOURCE: ABNORMAL
SP GR UR STRIP: 1.02 (ref 1–1.03)
TRANS CELLS #/AREA URNS HPF: ABNORMAL /HPF
UROBILINOGEN UR STRIP-ACNC: 0.2 EU/DL (ref 0.2–1)
WBC #/AREA URNS AUTO: ABNORMAL /HPF
WBC CLUMPS #/AREA URNS HPF: PRESENT /HPF

## 2021-01-09 PROCEDURE — 87086 URINE CULTURE/COLONY COUNT: CPT | Performed by: PHYSICIAN ASSISTANT

## 2021-01-09 PROCEDURE — 87186 SC STD MICRODIL/AGAR DIL: CPT | Performed by: PHYSICIAN ASSISTANT

## 2021-01-09 PROCEDURE — 99203 OFFICE O/P NEW LOW 30 MIN: CPT | Performed by: PHYSICIAN ASSISTANT

## 2021-01-09 PROCEDURE — 81001 URINALYSIS AUTO W/SCOPE: CPT | Performed by: FAMILY MEDICINE

## 2021-01-09 PROCEDURE — 87088 URINE BACTERIA CULTURE: CPT | Performed by: PHYSICIAN ASSISTANT

## 2021-01-09 RX ORDER — CEFDINIR 300 MG/1
300 CAPSULE ORAL 2 TIMES DAILY
Qty: 20 CAPSULE | Refills: 0 | Status: SHIPPED | OUTPATIENT
Start: 2021-01-09 | End: 2021-01-19

## 2021-01-09 RX ORDER — PHENAZOPYRIDINE HYDROCHLORIDE 200 MG/1
200 TABLET, FILM COATED ORAL 3 TIMES DAILY PRN
Qty: 9 TABLET | Refills: 0 | Status: SHIPPED | OUTPATIENT
Start: 2021-01-09 | End: 2021-01-12

## 2021-01-09 ASSESSMENT — MIFFLIN-ST. JEOR: SCORE: 1151.84

## 2021-01-10 NOTE — PROGRESS NOTES
Patient presents with:  Urgent Care  UTI: burning with urination, started a couple days ago      ASSESSMENT:   Lower, uncomplicated urinary tract infection.    PLAN:  OMNICEF  PYRIDIUM  As per ordered above  Drink plenty of fluids.  Prevention and treatment of UTI's discussed.Signs and symptoms of pyelonephritis mentioned.  Follow up with primary care physician if not improving        SUBJECTIVE:   Adalgisa Dennis is a 70 year old female who  presents today for a possible UTI. Symptoms of dysuria, urgency and frequency have been going on for 2day(s).  Hematuria no, but the urine has been dark.  sudden onsetand moderate.  There is no history of fever, chills, nausea or vomiting.  No history of vaginal or penile discharge. This patient does have a history of urinary tract infections. Patient denies long duration, rigors, flank pain, temperature > 101 degrees F. and Vomiting, significant nausea or diarrhea.    Past Medical History:   Diagnosis Date     Abnormal glandular Papanicolaou smear of cervix 2002    yearly paps since 2003     Atrial fibrillation, unspecified type (H) 2005     Cyst of thyroid      Depressive disorder, not elsewhere classified      Genital herpes, unspecified      Leiomyoma of uterus, unspecified      Patient Active Problem List   Diagnosis     Cyst of thyroid     Other isolated or specific phobias     Thrombocytopenia (H)     Degeneration of lumbar or lumbosacral intervertebral disc     Dysthymia     Paroxysmal atrial fibrillation (H)     Acquired hypothyroidism     CARDIOVASCULAR SCREENING; LDL GOAL LESS THAN 160     Advanced directives, counseling/discussion     Shoulder injury     Anxiety attack     Social History     Tobacco Use     Smoking status: Former Smoker     Smokeless tobacco: Former User   Substance Use Topics     Alcohol use: Not Currently     Alcohol/week: 0.0 standard drinks       ROS:   CONSTITUTIONAL:NEGATIVE for fever, chills, change in weight  INTEGUMENTARY/SKIN: NEGATIVE  "for worrisome rashes, moles or lesions  GI: NEGATIVE for nausea, abdominal pain, heartburn, or change in bowel habits  : as per HPI  Review of systems negative except as stated above.    OBJECTIVE:  Ht 1.651 m (5' 5\")   Wt 62.6 kg (138 lb)   LMP 09/14/2006   BMI 22.96 kg/m    GENERAL APPEARANCE: healthy, alert and no distress  ABDOMEN:  soft, nontender, no HSM or masses and bowel sounds normal  BACK: No CVA tenderness  SKIN: no suspicious lesions or rashes    "

## 2021-01-10 NOTE — PATIENT INSTRUCTIONS
(N39.0) Acute UTI  (primary encounter diagnosis)  Comment:   Plan: phenazopyridine (PYRIDIUM) 200 MG tablet,         cefdinir (OMNICEF) 300 MG capsule            (R30.0) Dysuria  Comment:   Plan: *UA reflex to Microscopic and Culture ([place]         and [facility] (except [place] and         [place]), Urine Microscopic            Patient Education     Urinary Tract Infections in Women    Urinary tract infections (UTIs) are most often caused by bacteria. These bacteria enter the urinary tract. The bacteria may come from inside the body. Or they may travel from the skin outside the rectum or vagina into the urethra. Female anatomy makes it easy for bacteria from the bowel to enter a woman s urinary tract, which is the most common source of UTI. This means women develop UTIs more often than men. Pain in or around the urinary tract is a common UTI symptom. But the only way to know for sure if you have a UTI for the healthcare provider to test your urine. The two tests that may be done are the urinalysis and urine culture.   Types of UTIs    Cystitis. A bladder infection (cystitis) is the most common UTI in women. You may have urgent or frequent need to pee. You may also have pain, burning when you pee, and bloody urine.    Urethritis. This is an inflamed urethra, which is the tube that carries urine from the bladder to outside the body. You may have lower stomach or back pain. You may also have urgent or frequent need to pee.    Pyelonephritis. This is a kidney infection. If not treated, it can be serious and damage your kidneys. In severe cases, you may need to stay in the hospital. You may have a fever and lower back pain.    Medicines to treat a UTI  Most UTIs are treated with antibiotics. These kill the bacteria. The length of time you need to take them depends on the type of infection. It may be as short as 3 days. If you have repeated UTIs, you may need a low-dose antibiotic for several months. Take  antibiotics exactly as directed. Don t stop taking them until all of the medicine is gone. If you stop taking the antibiotic too soon, the infection may not go away. You may also develop a resistance to the antibiotic. This can make it much harder to treat.   Lifestyle changes to treat and prevent UTIs   The lifestyle changes below will help get rid of your UTI. They may also help prevent future UTIs.     Drink plenty of fluids. This includes water, juice, or other caffeine-free drinks. Fluids help flush bacteria out of your body.    Empty your bladder. Always empty your bladder when you feel the urge to pee. And always pee before going to sleep. Urine that stays in your bladder can lead to infection. Try to pee before and after sex as well.    Practice good personal hygiene. Wipe yourself from front to back after using the toilet. This helps keep bacteria from getting into the urethra.    Use condoms during sex. These help prevent UTIs caused by sexually transmitted bacteria. Also don't use spermicides during sex. These can increase the risk for UTIs. Choose other forms of birth control instead. For women who tend to get UTIs after sex, a low-dose of a preventive antibiotic may be used. Be sure to discuss this option with your healthcare provider.    Follow up with your healthcare provider as directed. He or she may test to make sure the infection has cleared. If needed, more treatment may be started.  Lalo last reviewed this educational content on 7/1/2019 2000-2020 [employer]. [address] [place] [address], [place] [address]. All rights reserved. This information is not intended as a substitute for professional medical care. Always follow your healthcare professional's instructions.

## 2021-01-11 ENCOUNTER — AMBULATORY - HEALTHEAST (OUTPATIENT)
Dept: LAB | Facility: CLINIC | Age: 71
End: 2021-01-11

## 2021-01-11 DIAGNOSIS — E03.9 ACQUIRED HYPOTHYROIDISM: ICD-10-CM

## 2021-01-11 LAB
T3FREE SERPL-MCNC: 1.8 PG/ML (ref 1.9–3.9)
T4 FREE SERPL-MCNC: 1 NG/DL (ref 0.7–1.8)
TSH SERPL DL<=0.005 MIU/L-ACNC: 5.93 UIU/ML (ref 0.3–5)

## 2021-01-12 ENCOUNTER — COMMUNICATION - HEALTHEAST (OUTPATIENT)
Dept: FAMILY MEDICINE | Facility: CLINIC | Age: 71
End: 2021-01-12

## 2021-01-12 ENCOUNTER — TELEPHONE (OUTPATIENT)
Dept: URGENT CARE | Facility: URGENT CARE | Age: 71
End: 2021-01-12

## 2021-01-12 NOTE — TELEPHONE ENCOUNTER
Spoke with pt re:     Message  Received: Today  Message Contents   Elda Cool, Briseida Ayala, LC             ESBL producers are resistant to all cephalosporins (including 3rd generation).  Please contact patient to let her know that the urine culture grew out a bacteria resistant to the medication she is currently taking.       Please have her stop the Omnicef.     Please call in Macrobid 100mg 1 po BID for 7 days, dispense 14, no refills.       Thank you!   Elda      Called in rx to [facility]  lc avila

## 2021-01-13 LAB
BACTERIA SPEC CULT: ABNORMAL
Lab: ABNORMAL
SPECIMEN SOURCE: ABNORMAL
THYROGLOB AB SERPL-ACNC: <0.9 IU/ML (ref 0–4)

## 2021-01-15 ENCOUNTER — HEALTH MAINTENANCE LETTER (OUTPATIENT)
Age: 71
End: 2021-01-15

## 2021-03-02 ENCOUNTER — AMBULATORY - HEALTHEAST (OUTPATIENT)
Dept: LAB | Facility: CLINIC | Age: 71
End: 2021-03-02

## 2021-03-02 ENCOUNTER — COMMUNICATION - HEALTHEAST (OUTPATIENT)
Dept: FAMILY MEDICINE | Facility: CLINIC | Age: 71
End: 2021-03-02

## 2021-03-02 DIAGNOSIS — E03.9 ACQUIRED HYPOTHYROIDISM: ICD-10-CM

## 2021-03-02 LAB
T3FREE SERPL-MCNC: 2.9 PG/ML (ref 1.9–3.9)
T4 FREE SERPL-MCNC: 1 NG/DL (ref 0.7–1.8)
TSH SERPL DL<=0.005 MIU/L-ACNC: 0.46 UIU/ML (ref 0.3–5)

## 2021-03-03 ENCOUNTER — COMMUNICATION - HEALTHEAST (OUTPATIENT)
Dept: FAMILY MEDICINE | Facility: CLINIC | Age: 71
End: 2021-03-03

## 2021-04-07 ENCOUNTER — COMMUNICATION - HEALTHEAST (OUTPATIENT)
Dept: FAMILY MEDICINE | Facility: CLINIC | Age: 71
End: 2021-04-07

## 2021-04-12 ENCOUNTER — OFFICE VISIT - HEALTHEAST (OUTPATIENT)
Dept: FAMILY MEDICINE | Facility: CLINIC | Age: 71
End: 2021-04-12

## 2021-04-12 DIAGNOSIS — R53.82 CHRONIC FATIGUE: ICD-10-CM

## 2021-04-12 DIAGNOSIS — E03.9 ACQUIRED HYPOTHYROIDISM: ICD-10-CM

## 2021-04-12 DIAGNOSIS — F41.9 ANXIETY: ICD-10-CM

## 2021-04-12 DIAGNOSIS — I48.0 PAROXYSMAL ATRIAL FIBRILLATION (H): ICD-10-CM

## 2021-04-12 DIAGNOSIS — Z00.00 ROUTINE GENERAL MEDICAL EXAMINATION AT A HEALTH CARE FACILITY: ICD-10-CM

## 2021-04-12 DIAGNOSIS — Z13.228 ENCOUNTER FOR SCREENING FOR OTHER METABOLIC DISORDERS: ICD-10-CM

## 2021-04-12 LAB
ALBUMIN SERPL-MCNC: 4.6 G/DL (ref 3.5–5)
ALP SERPL-CCNC: 90 U/L (ref 45–120)
ALT SERPL W P-5'-P-CCNC: 12 U/L (ref 0–45)
ANION GAP SERPL CALCULATED.3IONS-SCNC: 16 MMOL/L (ref 5–18)
AST SERPL W P-5'-P-CCNC: 17 U/L (ref 0–40)
BILIRUB SERPL-MCNC: 0.4 MG/DL (ref 0–1)
BUN SERPL-MCNC: 15 MG/DL (ref 8–28)
CALCIUM SERPL-MCNC: 9.3 MG/DL (ref 8.5–10.5)
CHLORIDE BLD-SCNC: 102 MMOL/L (ref 98–107)
CHOLEST SERPL-MCNC: 233 MG/DL
CO2 SERPL-SCNC: 22 MMOL/L (ref 22–31)
CREAT SERPL-MCNC: 0.64 MG/DL (ref 0.6–1.1)
ERYTHROCYTE [DISTWIDTH] IN BLOOD BY AUTOMATED COUNT: 12.7 % (ref 11–14.5)
FASTING STATUS PATIENT QL REPORTED: YES
GFR SERPL CREATININE-BSD FRML MDRD: >60 ML/MIN/1.73M2
GLUCOSE BLD-MCNC: 69 MG/DL (ref 70–125)
HCT VFR BLD AUTO: 43.3 % (ref 35–47)
HDLC SERPL-MCNC: 55 MG/DL
HGB BLD-MCNC: 14.5 G/DL (ref 12–16)
LDLC SERPL CALC-MCNC: 148 MG/DL
MCH RBC QN AUTO: 31 PG (ref 27–34)
MCHC RBC AUTO-ENTMCNC: 33.5 G/DL (ref 32–36)
MCV RBC AUTO: 93 FL (ref 80–100)
PLATELET # BLD AUTO: 157 THOU/UL (ref 140–440)
POTASSIUM BLD-SCNC: 4.5 MMOL/L (ref 3.5–5)
PROT SERPL-MCNC: 7 G/DL (ref 6–8)
RBC # BLD AUTO: 4.67 MILL/UL (ref 3.8–5.4)
SODIUM SERPL-SCNC: 140 MMOL/L (ref 136–145)
T3FREE SERPL-MCNC: 3.5 PG/ML (ref 1.9–3.9)
T4 FREE SERPL-MCNC: 1 NG/DL (ref 0.7–1.8)
TRIGL SERPL-MCNC: 148 MG/DL
TSH SERPL DL<=0.005 MIU/L-ACNC: 0.85 UIU/ML (ref 0.3–5)
WBC: 13.4 THOU/UL (ref 4–11)

## 2021-04-12 ASSESSMENT — MIFFLIN-ST. JEOR: SCORE: 1158.18

## 2021-04-12 ASSESSMENT — PATIENT HEALTH QUESTIONNAIRE - PHQ9: SUM OF ALL RESPONSES TO PHQ QUESTIONS 1-9: 5

## 2021-04-13 ENCOUNTER — COMMUNICATION - HEALTHEAST (OUTPATIENT)
Dept: LAB | Facility: CLINIC | Age: 71
End: 2021-04-13

## 2021-04-13 ENCOUNTER — COMMUNICATION - HEALTHEAST (OUTPATIENT)
Dept: FAMILY MEDICINE | Facility: CLINIC | Age: 71
End: 2021-04-13

## 2021-04-16 ENCOUNTER — AMBULATORY - HEALTHEAST (OUTPATIENT)
Dept: LAB | Facility: CLINIC | Age: 71
End: 2021-04-16

## 2021-04-16 DIAGNOSIS — R53.82 CHRONIC FATIGUE: ICD-10-CM

## 2021-04-18 LAB
MAGNESIUM,RBC - HISTORICAL: 4.9 MG/DL (ref 3.5–7.1)
SPECIMEN STATUS: NORMAL

## 2021-04-19 LAB
EBV DNA # SPEC NAA+PROBE: <390 CPY/ML
EBV DNA SPEC NAA+PROBE-LOG#: <2.6 LOG
EBV DNA SPEC QL NAA+PROBE: NOT DETECTED
EBV EA-D IGG SER-ACNC: <0.2 AI (ref 0–0.8)
EBV NA IGG SER IA-ACNC: 6.3 AI (ref 0–0.8)
EBV VCA IGG SER IA-ACNC: >8 AI (ref 0–0.8)
EBV VCA IGM SER IA-ACNC: <0.2 AI (ref 0–0.8)
SPECIMEN SOURCE: NORMAL

## 2021-04-20 ENCOUNTER — COMMUNICATION - HEALTHEAST (OUTPATIENT)
Dept: FAMILY MEDICINE | Facility: CLINIC | Age: 71
End: 2021-04-20

## 2021-04-22 ENCOUNTER — OFFICE VISIT - HEALTHEAST (OUTPATIENT)
Dept: FAMILY MEDICINE | Facility: CLINIC | Age: 71
End: 2021-04-22
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
## 2021-05-11 ENCOUNTER — HOSPITAL ENCOUNTER (OUTPATIENT)
Dept: CARDIOLOGY | Facility: HOSPITAL | Age: 71
Discharge: HOME | End: 2021-05-11
Attending: FAMILY MEDICINE
Payer: COMMERCIAL

## 2021-05-11 DIAGNOSIS — I48.0 PAROXYSMAL ATRIAL FIBRILLATION (H): ICD-10-CM

## 2021-05-25 ENCOUNTER — RECORDS - HEALTHEAST (OUTPATIENT)
Dept: FAMILY MEDICINE | Facility: CLINIC | Age: 71
End: 2021-05-25

## 2021-05-26 ASSESSMENT — PATIENT HEALTH QUESTIONNAIRE - PHQ9: SUM OF ALL RESPONSES TO PHQ QUESTIONS 1-9: 3

## 2021-05-27 ASSESSMENT — PATIENT HEALTH QUESTIONNAIRE - PHQ9
SUM OF ALL RESPONSES TO PHQ QUESTIONS 1-9: 5
SUM OF ALL RESPONSES TO PHQ QUESTIONS 1-9: 5
SUM OF ALL RESPONSES TO PHQ QUESTIONS 1-9: 3

## 2021-05-27 NOTE — TELEPHONE ENCOUNTER
Refill Approved    Rx renewed per Medication Renewal Policy. Medication was last renewed on 12/19/18.    Iesha Ortiz, [facility] Triage/Med Refill 4/2/2019     Requested Prescriptions   Pending Prescriptions Disp Refills     levothyroxine (SYNTHROID, LEVOTHROID) 125 MCG tablet [Pharmacy Med Name: LEVOTHYROXINE 125 MCG TAB** 125 TAB] 30 tablet 11     Sig: TAKE 1 TABLET BY MOUTH 2 DAYS A WEEK    Thyroid Hormones Protocol Passed - 4/1/2019 10:09 AM       Passed - Provider visit in past 12 months or next 3 months    Last office visit with prescriber/PCP: 1/7/2019 Melissa Walsh MD OR same dept: 1/7/2019 Melissa Walsh MD OR same specialty: 1/7/2019 Melissa Walsh MD  Last physical: 3/15/2019 Last MTM visit: Visit date not found   Next visit within 3 mo: Visit date not found  Next physical within 3 mo: Visit date not found  Prescriber OR PCP: Melissa Walsh MD  Last diagnosis associated with med order: 1. Hypothyroidism  - levothyroxine (SYNTHROID, LEVOTHROID) 125 MCG tablet [Pharmacy Med Name: LEVOTHYROXINE 125 MCG TAB** 125 TAB]; TAKE 1 TABLET BY MOUTH 2 DAYS A WEEK  Dispense: 30 tablet; Refill: 11    If protocol passes may refill for 12 months if within 3 months of last provider visit (or a total of 15 months).            Passed - TSH on file in past 12 months for patient age 12 & older    TSH   Date Value Ref Range Status   01/07/2019 0.59 0.30 - 5.00 uIU/mL Final

## 2021-05-27 NOTE — TELEPHONE ENCOUNTER
Controlled Substance Refill Request  Medication:   Requested Prescriptions     Pending Prescriptions Disp Refills     LORazepam (ATIVAN) 0.5 MG tablet [Pharmacy Med Name: LORAZEPAM 0.5 MG TABLET** 0.5 TAB] 30 tablet 4     Sig: TAKE 1 TABLET BY MOUTH DAILY AS NEEDED FOR ANXIETY     Date Last Fill: 11/16/18  Pharmacy: [facility]    Submit electronically to pharmacy  Controlled Substance Agreement on File:   Encounter-Level CSA Scan Date:    There are no encounter-level csa scan date.       Last office visit: Last office visit pertaining to requested medication was 3/15/19

## 2021-05-27 NOTE — TELEPHONE ENCOUNTER
Medication: LORazepam (ATIVAN) 0.5 MG tablet  Pharmacy: [facility]   Last OV: 03/15/19  Last Refill: 11/16/18 Q:30  Refills: 0    Please advise on refill.     EAGLE/MICA

## 2021-05-28 NOTE — TELEPHONE ENCOUNTER
Call pt - she needs a visit to discuss lorazepam for anxiety. We need to have her sign controlled substance contract for this med.

## 2021-05-28 NOTE — TELEPHONE ENCOUNTER
Patient has scheduled an appointment on  05/01/19 to discuss lorazepam for anxiety per Dr. Walsh's request.     EAGLE/MICA

## 2021-05-28 NOTE — PROGRESS NOTES
"SUBJECTIVE: Adalgisa Dennis is a 69 y.o. female with:  Chief Complaint   Patient presents with     Medication check     lorazepam-anxiety    1) She had bilateral cataract surgery.  Still has some aching from eye procedure but seems to be healing well.    2) Anxiety- Using lorazepam for flying mostly.  Occasionally uses med for sleep and also for some of her chronic Lymes disease pain.  Med helps her relax.  She uses 30 tabs in about 4 months.  No side effects from the medication.    3) Chronic Lyme Disease - She is doing LDI homeopathic therapy with [facility] provider.  Took 4 months of antibiotics this summer.  She is very frustrated as still having a lot of fatigue.  Thinking about seeing another Lyme provider.  She is doing guided imagery and meditation daily.  Also trying to eat healthy diet.  Planning a trip to [place] soon.    4) Osteopenia - Last dexa scan in 2018.  Took ProBono supplement for 1 year and wonders about doing another dexa scan.    5) Atrial fibrillation - She has been in sinus rhythm.  Has not followed up with cardiology this year.    SH: Single.  Retired.    OBJECTIVE: /64 (Patient Site: Right Arm, Patient Position: Sitting, Cuff Size: Adult Regular)   Pulse 62   Ht 5' 5.5\" (1.664 m)   Wt 161 lb (73 kg)   LMP 01/28/2006   BMI 26.38 kg/m   no distress  PSYCH:  Awake, alert, and oriented x 3.  Mood and affect are appropriate.  Good eye contact.  Speech is normal.  No suicidal ideation.  No hallucinations.    Adalgisa was seen today for medication check.    Diagnoses and all orders for this visit:    Fear of flying / anxiety - Controlled substance contract done for lorazepam- she can use #30 tabs every 4 months.  Needs to be seen every 6 months for follow-up.  She does understand potential side effects of medicine.    Lyme disease- We discussed challenges with treatment.  She will continue current protocol for now.    Osteopenia, unspecified location- Will hold off on dexa " scan given up coming trip and other pressing medical issues.  I think it would be fine to recheck next year.  Stop ProBono given cost and take a calcium/ magnesium supplement with adequate vitamin D.       Melissa Walsh

## 2021-05-29 NOTE — TELEPHONE ENCOUNTER
Refill Approved    Rx renewed per Medication Renewal Policy. Medication was last renewed on 9/20/18.    Iesha Ortiz, [facility] Triage/Med Refill 6/3/2019     Requested Prescriptions   Pending Prescriptions Disp Refills     propranolol (INDERAL) 20 MG tablet [Pharmacy Med Name: PROPRANOLOL 20 MG TABLET 20 TAB] 30 tablet 2     Sig: TAKE 1 TABLET (20 MG TOTAL) BY MOUTH DAILY.       Beta-Blockers Refill Protocol Passed - 6/1/2019 12:03 PM        Passed - PCP or prescribing provider visit in past 12 months or next 3 months     Last office visit with prescriber/PCP: 5/1/2019 Melissa Walsh MD OR same dept: 5/1/2019 Melissa Walsh MD OR same specialty: 5/1/2019 Melissa Walsh MD  Last physical: 3/15/2019 Last MTM visit: Visit date not found   Next visit within 3 mo: Visit date not found  Next physical within 3 mo: Visit date not found  Prescriber OR PCP: Melissa Walsh MD  Last diagnosis associated with med order: There are no diagnoses linked to this encounter.  If protocol passes may refill for 12 months if within 3 months of last provider visit (or a total of 15 months).             Passed - Blood pressure filed in past 12 months     BP Readings from Last 1 Encounters:   05/01/19 100/64

## 2021-05-30 NOTE — TELEPHONE ENCOUNTER
Refill Approved    Rx renewed per Medication Renewal Policy. Medication was last renewed on 5/14/18.    Iesha Ortiz, [facility] Triage/Med Refill 7/9/2019     Requested Prescriptions   Pending Prescriptions Disp Refills     levothyroxine (SYNTHROID, LEVOTHROID) 112 MCG tablet [Pharmacy Med Name: LEVOTHYROXINE  MCG** 112 TAB] 90 tablet 3     Sig: TAKE 1 TABLET BY MOUTH 5 DAYS A WEEK       Thyroid Hormones Protocol Passed - 7/9/2019  5:39 PM        Passed - Provider visit in past 12 months or next 3 months     Last office visit with prescriber/PCP: 5/1/2019 Melissa Walsh MD OR same dept: 5/1/2019 Melissa Walsh MD OR same specialty: 5/1/2019 Melissa Walsh MD  Last physical: 3/15/2019 Last MTM visit: Visit date not found   Next visit within 3 mo: Visit date not found  Next physical within 3 mo: Visit date not found  Prescriber OR PCP: Melissa Walsh MD  Last diagnosis associated with med order: 1. Acquired hypothyroidism  - levothyroxine (SYNTHROID, LEVOTHROID) 112 MCG tablet [Pharmacy Med Name: LEVOTHYROXINE  MCG** 112 TAB]; TAKE 1 TABLET BY MOUTH 5 DAYS A WEEK  Dispense: 90 tablet; Refill: 3    If protocol passes may refill for 12 months if within 3 months of last provider visit (or a total of 15 months).             Passed - TSH on file in past 12 months for patient age 12 & older     TSH   Date Value Ref Range Status   01/07/2019 0.59 0.30 - 5.00 uIU/mL Final

## 2021-05-31 VITALS — BODY MASS INDEX: 26.16 KG/M2 | WEIGHT: 157 LBS | HEIGHT: 65 IN

## 2021-05-31 VITALS — WEIGHT: 152.5 LBS | BODY MASS INDEX: 25.38 KG/M2

## 2021-05-31 VITALS — HEIGHT: 65 IN | WEIGHT: 157.75 LBS | BODY MASS INDEX: 26.28 KG/M2

## 2021-05-31 NOTE — TELEPHONE ENCOUNTER
Controlled Substance Refill Request  Medication:   Requested Prescriptions     Pending Prescriptions Disp Refills     LORazepam (ATIVAN) 0.5 MG tablet [Pharmacy Med Name: LORAZEPAM 0.5 MG TABLET** 0.5 TAB] 30 tablet 2     Sig: TAKE 1 TABLET BY MOUTH DAILY AS NEEDED FOR ANXIETY     Date Last Fill: 4/19/19  Pharmacy:  [facility]  Submit electronically to pharmacy  Controlled Substance Agreement on File:   Encounter-Level CSA Scan Date:    There are no encounter-level csa scan date.       Last office visit: Last office visit pertaining to requested medication was 5/1/19  Bridget San RN, BSN [facility] Triage Nurse

## 2021-05-31 NOTE — TELEPHONE ENCOUNTER
Medication: LORazepam (ATIVAN) 0.5 MG tablet  Pharmacy: [facility]   Last OV: 05/01/19  Last Refill: 04/19/19  Q:30  Refills: 0    Please advise on refill.     EAGLE/EDITH

## 2021-06-01 VITALS — HEIGHT: 65 IN | BODY MASS INDEX: 25.38 KG/M2

## 2021-06-01 VITALS — BODY MASS INDEX: 26.71 KG/M2 | WEIGHT: 160.5 LBS

## 2021-06-01 VITALS — BODY MASS INDEX: 25.38 KG/M2 | HEIGHT: 65 IN

## 2021-06-02 VITALS — BODY MASS INDEX: 26.33 KG/M2 | HEIGHT: 65 IN | WEIGHT: 158 LBS

## 2021-06-02 VITALS — HEIGHT: 66 IN | BODY MASS INDEX: 26.06 KG/M2

## 2021-06-02 VITALS — BODY MASS INDEX: 26.49 KG/M2 | HEIGHT: 65 IN | WEIGHT: 159 LBS

## 2021-06-02 VITALS — HEIGHT: 65 IN | BODY MASS INDEX: 25.99 KG/M2 | WEIGHT: 156 LBS

## 2021-06-02 VITALS — BODY MASS INDEX: 25.71 KG/M2 | WEIGHT: 160 LBS | HEIGHT: 66 IN

## 2021-06-02 VITALS — HEIGHT: 66 IN | WEIGHT: 159 LBS | BODY MASS INDEX: 25.55 KG/M2

## 2021-06-02 NOTE — TELEPHONE ENCOUNTER
Who is calling:  patient  Reason for Call:  Patient stated I messed up the cortisol kit and need another one. I will be in there in about an hour to pick this up.  Orders are marked future. Please advised if patient is not able to obtain another kit and what she should do prior to coming in.   Date of last appointment with primary care: 7/25/19  Okay to leave a detailed message: Yes

## 2021-06-02 NOTE — TELEPHONE ENCOUNTER
Informed patient that we currently have enough vials for her to . We will contact her once we have enough vials for her to pickup. Patient understood. No further questions.     EAGLE/EDITH

## 2021-06-02 NOTE — TELEPHONE ENCOUNTER
Who is calling:  Patient   Reason for Call:  Checking status on below message, Please  call patient back an advise if  Vials for Cortisol test have been received.   Date of last appointment with primary care:  10/2/19  Has the patient been recently seen:  Yes  Okay to leave a detailed message: Yes

## 2021-06-02 NOTE — TELEPHONE ENCOUNTER
Called patient and informed her that we just received new vials. She can can check in at the  to pick them up. Informed her that the  is closing early today so if she can be here before 4pm. Patient understood and said that if she is not able to make it in today she will be coming tomorrow morning.    Informed patient she will only be receiving 3 cortisol vials since she has 1 additional and she needs a total of 4. Patient understood. No further questions.     EAGLE/EDITH

## 2021-06-03 VITALS — WEIGHT: 161 LBS | BODY MASS INDEX: 25.88 KG/M2 | HEIGHT: 66 IN

## 2021-06-03 VITALS
BODY MASS INDEX: 25.71 KG/M2 | WEIGHT: 160 LBS | OXYGEN SATURATION: 97 % | SYSTOLIC BLOOD PRESSURE: 104 MMHG | HEIGHT: 66 IN | HEART RATE: 60 BPM | DIASTOLIC BLOOD PRESSURE: 70 MMHG

## 2021-06-04 VITALS
DIASTOLIC BLOOD PRESSURE: 52 MMHG | RESPIRATION RATE: 20 BRPM | TEMPERATURE: 98.6 F | HEART RATE: 59 BPM | WEIGHT: 141.4 LBS | SYSTOLIC BLOOD PRESSURE: 109 MMHG | BODY MASS INDEX: 23.53 KG/M2

## 2021-06-04 VITALS — OXYGEN SATURATION: 99 % | SYSTOLIC BLOOD PRESSURE: 105 MMHG | DIASTOLIC BLOOD PRESSURE: 65 MMHG | HEART RATE: 63 BPM

## 2021-06-04 VITALS
DIASTOLIC BLOOD PRESSURE: 60 MMHG | RESPIRATION RATE: 16 BRPM | HEIGHT: 65 IN | HEART RATE: 60 BPM | TEMPERATURE: 97.6 F | BODY MASS INDEX: 27.32 KG/M2 | OXYGEN SATURATION: 100 % | WEIGHT: 164 LBS | SYSTOLIC BLOOD PRESSURE: 112 MMHG

## 2021-06-04 NOTE — TELEPHONE ENCOUNTER
Refill Approved    Rx renewed per Medication Renewal Policy. Medication was last renewed on 7/9/19.    Melania Harrell, [facility] Triage/Med Refill 12/10/2019     Requested Prescriptions   Pending Prescriptions Disp Refills     levothyroxine (SYNTHROID, LEVOTHROID) 112 MCG tablet [Pharmacy Med Name: LEVOTHYROXINE 112 MCG TAB** 112 TAB] 90 tablet 1     Sig: TAKE 1 TABLET BY MOUTH 5 DAYS A WEEK       Thyroid Hormones Protocol Passed - 12/9/2019  1:51 PM        Passed - Provider visit in past 12 months or next 3 months     Last office visit with prescriber/PCP: 10/2/2019 Melissa Walsh MD OR same dept: 10/2/2019 Melissa Walsh MD OR same specialty: 10/2/2019 Melissa Walsh MD  Last physical: 3/15/2019 Last MTM visit: Visit date not found   Next visit within 3 mo: Visit date not found  Next physical within 3 mo: Visit date not found  Prescriber OR PCP: Melissa Walsh MD  Last diagnosis associated with med order: 1. Acquired hypothyroidism  - levothyroxine (SYNTHROID, LEVOTHROID) 112 MCG tablet [Pharmacy Med Name: LEVOTHYROXINE 112 MCG TAB** 112 TAB]; TAKE 1 TABLET BY MOUTH 5 DAYS A WEEK  Dispense: 90 tablet; Refill: 1    If protocol passes may refill for 12 months if within 3 months of last provider visit (or a total of 15 months).             Passed - TSH on file in past 12 months for patient age 12 & older     TSH   Date Value Ref Range Status   11/07/2019 5.22 (H) 0.30 - 5.00 uIU/mL Final

## 2021-06-05 VITALS
DIASTOLIC BLOOD PRESSURE: 62 MMHG | WEIGHT: 141.38 LBS | SYSTOLIC BLOOD PRESSURE: 100 MMHG | HEIGHT: 65 IN | HEART RATE: 57 BPM | TEMPERATURE: 97.8 F | RESPIRATION RATE: 16 BRPM | BODY MASS INDEX: 23.56 KG/M2 | OXYGEN SATURATION: 98 %

## 2021-06-05 VITALS
TEMPERATURE: 98.5 F | OXYGEN SATURATION: 99 % | SYSTOLIC BLOOD PRESSURE: 102 MMHG | WEIGHT: 146.3 LBS | HEART RATE: 64 BPM | DIASTOLIC BLOOD PRESSURE: 60 MMHG | BODY MASS INDEX: 24.53 KG/M2

## 2021-06-05 NOTE — PROGRESS NOTES
OFFICE VISIT - FAMILY MEDICINE     ASSESSMENT AND PLAN     1. Travel advice encounter  rifAXIMin (XIFAXAN) 200 mg tablet   2. Paroxysmal atrial fibrillation (H)  propranolol (INDERAL) 20 MG tablet   3. Anxiety  LORazepam (ATIVAN) 0.5 MG tablet   Topic discussed:Eat and drink safely;Prevent bug bites;Stay safe outdoors;Keep away from animals;Reduce your exposure to germs;Avoid sharing body fluids;Know how to get medical care while traveling;Select safe transportation;Maintain personal security.  CDC travel advice reviewed, we discussed non-vaccine preventable disease with mosquito repellent, longsleeve shirt etc., drink bottled water.atrrial fibrillation, currently on propanolol, was also started on Eliquis recently.Anxiety, we discussed prevention with relaxation technique, only uses Ativan as needed.  She declined pneumonia, Shingrix and influenza vaccine today; she was encouraged to follow with Dr. Walsh.  More than  50% of this 40 minutes visit was spent in counseling and coordination of care.    CHIEF COMPLAINT   Travel Consult (Going to [place], leaving on 2/25/20 - 3/18/20. Checking to see if vaccines are needed.)    HPI   Adalgisa Dennis is a 69 y.o. female.  No Patient Care Coordination Note on file.  She is traveling to [place], she will be staying there for about 3 weeks, hiking, going to the [facility]; has traveled to [place] in the past, but is the first time she is going to this region.  We did review CDC guidelines and recommendation.  She will need a typhoid vaccine, we also reviewed regular routine immunization, she will need tetanus shot, pneumonia vaccine, shingles  and influenza vaccine.  She is also asking for traveler's diarrhea prophylaxis.  Has anxiety, with depression, asking for a refill of Ativan to be used as needed.  Paroxysmal atrial fibrillation, was just recently started on Eliquis, also taking propanolol for rate control.  Triggers are usually dehydration, alcohol  etc.,      Review of Systems As per HPI, otherwise negative.    OBJECTIVE   /65 (Patient Site: Right Arm, Patient Position: Sitting, Cuff Size: Adult Regular)   Pulse 63   LMP 01/28/2006   SpO2 99%   Physical Exam   Constitutional: She appears well-developed and well-nourished.   Psychiatric: She has a normal mood and affect. Her behavior is normal. Judgment and thought content normal.       PFSH     Family History   Problem Relation Age of Onset     Breast cancer Sister      Bipolar disorder Sister      Stroke Father 87     No Medical Problems Maternal Grandmother      No Medical Problems Maternal Grandfather      No Medical Problems Paternal Grandmother      Cancer Paternal Grandfather      Hyperlipidemia Sister      No Medical Problems Half Brother      Diabetes Other      Social History     Socioeconomic History     Marital status: Single     Spouse name: Not on file     Number of children: Not on file     Years of education: Not on file     Highest education level: Not on file   Occupational History     Not on file   Social Needs     Financial resource strain: Not on file     Food insecurity:     Worry: Not on file     Inability: Not on file     Transportation needs:     Medical: Not on file     Non-medical: Not on file   Tobacco Use     Smoking status: Former Smoker     Types: Cigarettes     Smokeless tobacco: Never Used     Tobacco comment: in 20's and 30's   Substance and Sexual Activity     Alcohol use: No     Drug use: No     Sexual activity: Not on file   Lifestyle     Physical activity:     Days per week: Not on file     Minutes per session: Not on file     Stress: Not on file   Relationships     Social connections:     Talks on phone: Not on file     Gets together: Not on file     Attends Synagogue service: Not on file     Active member of club or organization: Not on file     Attends meetings of clubs or organizations: Not on file     Relationship status: Not on file     Intimate partner  violence:     Fear of current or ex partner: Not on file     Emotionally abused: Not on file     Physically abused: Not on file     Forced sexual activity: Not on file   Other Topics Concern     Not on file   Social History Narrative     Not on file     Relevant history was reviewed with the patient today, unless noted in HPI, nothing is pertinent for this visit.  [facility]     Patient Active Problem List    Diagnosis Date Noted     Chronic fatigue 06/18/2018     Digestive disorder 06/15/2018     Overview Note:     History of yeast overgrowth in past       Atrial fibrillation (H) 08/28/2017     Acquired hypothyroidism 10/05/2009     Dysthymia 07/14/2008     DDD (degenerative disc disease), lumbosacral 08/06/2007     Cyst of thyroid 08/04/2004     Overview Note:     Overview:   Total thyroidectomy done, not cancer       Past Surgical History:   Procedure Laterality Date     APPENDECTOMY  1965     THYROIDECTOMY  2006       RESULTS/CONSULTS (Lab/Rad)   No results found for this or any previous visit (from the past 168 hour(s)).  No results found.  MEDICATIONS     Current Outpatient Medications on File Prior to Visit   Medication Sig Dispense Refill     ascorbic acid, vitamin C, (VITAMIN C) 1000 MG tablet Take 1,000 mg by mouth.       cholecalciferol, vitamin D3, (VITAMIN D3) 5,000 unit Tab Take 10,000 Units by mouth daily.       conjugated estrogens (PREMARIN) vaginal cream Insert 1/2 applicatorful into vagina at night three times weekly       ELIQUIS 5 mg Tab tablet Take 5 mg by mouth 2 (two) times a day.  3     levothyroxine (SYNTHROID, LEVOTHROID) 112 MCG tablet TAKE 1 TABLET BY MOUTH 5 DAYS A WEEK 90 tablet 3     levothyroxine (SYNTHROID, LEVOTHROID) 125 MCG tablet TAKE 1 TABLET BY MOUTH 3 DAYS A WEEK 52 tablet 1     multivitamin (ONE A DAY) per tablet Alpha Base without iron Multi-takes daily       OMEGA-3/DHA/EPA/FISH OIL (FISH OIL-OMEGA-3 FATTY ACIDS) 300-1,000 mg capsule Take 1 g by mouth daily.       [DISCONTINUED]  LORazepam (ATIVAN) 0.5 MG tablet TAKE 1 TABLET BY MOUTH DAILY AS NEEDED FOR ANXIETY 30 tablet 2     [DISCONTINUED] propranolol (INDERAL) 20 MG tablet TAKE 1 TABLET (20 MG TOTAL) BY MOUTH DAILY. 90 tablet 2     UNABLE TO FIND Take by mouth. HOMEOPATHIC PRODUCTS PO       No current facility-administered medications on file prior to visit.        HEALTH MAINTENANCE / SCREENING   PHQ-2 Total Score: 0 (10/2/2019 12:00 PM)  , PHQ-9 Total Score: 3 (10/2/2019 12:00 PM)  ,No data recorded  Immunization History   Administered Date(s) Administered     Hep A, historic 01/06/1998, 06/26/1998     Hep B, historic 01/06/1998, 02/02/1998, 06/26/1998     Td, Adult, Absorbed 07/20/1994, 08/04/2004     Td, adult adsorbed, PF 08/04/2004, 02/03/2020     Tdap 11/15/2010     Typhoid Live, Oral 05/01/2006     Typhoid, Inj, Inactive 05/01/2006, 05/29/2013, 02/03/2020     Yellow Fever 05/29/2013     Health Maintenance   Topic     HEPATITIS C SCREENING      LIPID      ZOSTER VACCINES (1 of 2)     MEDICARE ANNUAL WELLNESS VISIT      PNEUMOCOCCAL IMMUNIZATION 65+ LOW/MEDIUM RISK (1 of 2 - PCV13)     DXA SCAN      INFLUENZA VACCINE RULE BASED (1)     ADVANCE CARE PLANNING      FALL RISK ASSESSMENT      MAMMOGRAM      COLONOSCOPY      TD 18+ HE      DEPRESSION ACTION PLAN      The following are part of a depression follow up plan for the patient:  under care of mental health counselor, coping support assessment, coping support management, emotional support assessment, emotional support education and emotional support management  Dileep Hay MD  Family Medicine, [facility]     This note was dictated using a voice recognition software.  Any grammatical or context distortion are unintentional and inherent to the software.

## 2021-06-05 NOTE — TELEPHONE ENCOUNTER
Central PA team  [phone]  Pool: [facility] ([id number])          PA has been initiated.       PA form completed and faxed insurance via Cover My Meds     Key:  [id number]     Medication:  XIFAXAN 200MG    Insurance:  [employer]         Response will be received via fax and may take up to 5-10 business days depending on plan

## 2021-06-05 NOTE — TELEPHONE ENCOUNTER
Prior Authorization Request  Who s requesting:  Pharmacy  Pharmacy Name and Location: [facility]  Medication Name: Xifaxan 200 mg tablet  Insurance Plan: BC/AVTAR [place]  Insurance Member ID Number:  [id number]  CoverMyMeds Key: N/A  Informed patient that prior authorizations can take up to 10 business days for response:   NA  Okay to leave a detailed message: No

## 2021-06-05 NOTE — TELEPHONE ENCOUNTER
Refill Approved    Rx renewed per Medication Renewal Policy. Medication was last renewed on 4/2/2019 for 30/9.  Last OV 10/2/2019  Afia Nails, [facility] Triage/Med Refill 1/18/2020     Requested Prescriptions   Pending Prescriptions Disp Refills     levothyroxine (SYNTHROID, LEVOTHROID) 125 MCG tablet [Pharmacy Med Name: LEVOTHYROXINE SODIUM 125  TAB] 30 tablet 9     Sig: TAKE 1 TABLET BY MOUTH 2 DAYS A WEEK       Thyroid Hormones Protocol Passed - 1/16/2020  1:19 PM        Passed - Provider visit in past 12 months or next 3 months     Last office visit with prescriber/PCP: 10/2/2019 Melissa Walsh MD OR same dept: 10/2/2019 Melissa Walsh MD OR same specialty: 10/2/2019 Melissa Walsh MD  Last physical: 3/15/2019 Last MTM visit: Visit date not found   Next visit within 3 mo: Visit date not found  Next physical within 3 mo: Visit date not found  Prescriber OR PCP: Melissa Walsh MD  Last diagnosis associated with med order: 1. Hypothyroidism  - levothyroxine (SYNTHROID, LEVOTHROID) 125 MCG tablet [Pharmacy Med Name: LEVOTHYROXINE SODIUM 125  TAB]; TAKE 1 TABLET BY MOUTH 2 DAYS A WEEK  Dispense: 30 tablet; Refill: 9    If protocol passes may refill for 12 months if within 3 months of last provider visit (or a total of 15 months).             Passed - TSH on file in past 12 months for patient age 12 & older     TSH   Date Value Ref Range Status   11/07/2019 5.22 (H) 0.30 - 5.00 uIU/mL Final

## 2021-06-05 NOTE — PATIENT INSTRUCTIONS - HE
PATIENT COUNSELLING  1- Eat and drink safely  Be diligent about food and water precautions:    Avoid cooked food served at room temperature.    Avoid raw food, including raw vegetables unless they can be washed thoroughly.    Drink only beverages from sealed bottles or cans.    Water is safe if it has been boiled or chemically treated.    Avoid ice unless made from bottled/disinfected water.  2- Prevent bug bites  Be diligent in insect precautions:    Cover exposed skin.    Use an appropriate insect repellent. (see below)    Use permethrin-treated clothing and gear (such as boots, pants, socks, and tents). Travelers can buy pre-treated clothing and gear or treat them at home. Treated clothing remains protective after multiple washings. Permethrin should NOT be used directly on skin.    Stay and sleep under in air-conditioned or screened rooms.    Use a bed net if sleeping area is exposed to the outdoors.  3- Stay safe outdoors  Exercise caution during outdoor activities. Important tips include dressing appropriately for the climate (such as loose, lightweight clothing in hot climates and warm layers in cold climates), staying hydrated, avoiding overexposure to the sun, and practicing safe swimming habits. To avoid infection while swimming, travelers should not swallow water when swimming and avoid contact with water that may be contaminated from poor sanitation.  Encourage travelers to learn basic first aid and CPR before travel, especially if they will be traveling to remote areas where medical assistance may not be accessible. Help them assemble a travel health kit.  4- Keep away from animals  be cautious around all animals.    Travelers should avoid touching, petting, handling, or feeding animals, including pets.    Arthropods such as spiders and scorpions can pose a stinging risk, and travelers should exercise care in environments where these creatures  are likely to be present.    Stress the urgency of treating suspected and probable rabies infection by:    Washing the wound immediately with soap and clean water.    Seeking medical attention as soon as possible.    Travelers at risk for rabies should consider medical evacuation insurance, since postexposure prophylaxis may not be available at the destination.  5- Reduce your exposure to germs  People who are ill should not travel. Urge travelers to practice hand hygiene and sneeze into a tissue or their sleeve   6- Avoid sharing body fluids  Travelers should:    Use a latex condom correctly every time they engage in sex (vaginal, anal, and oral-genital).    Not inject drugs.    Limit alcohol consumption.    Not have tattoos, piercings, or other procedures that use needles (acupuncture) unless the needles are packaged new or sterilized.    Ensure that medical and dental equipment is sterile or disinfected if seeking care.    7- Know how and where to get medical care while traveling  Travelers should plan for how to obtain health care during their trip, should the need arise.  Discuss supplemental travel health insurance and medical evacuation insurance, and consider helping the traveler obtain an extra month of prescriptions for any needed medications.  Travelers may think they can find cheaper antimalarial drugs at their destination. To ensure medication quality, urge them to have their prescriptions filled in the [place].  8- Select safe transportation  9- Motor vehicle crashes are the #1 killer of healthy [place] citizens in foreign countries.  Most recommendations for safe transportation are basic and could be considered common sense. However, travelers often do not think about the importance of being aware and careful when walking, riding, driving, or flying.  In many places cars, buses, large trucks, rickshaws, bikes, pedestrians, and even animals share the same lanes of traffic, increasing the risk for  crashes.   travelers to think about transportation options before they arrive,   10- Medical Evacuation Insurance  If your patient is seriously injured, emergency care may not be available or may not meet [place] standards. Trauma care centers are uncommon outside urban areas. Encourage patients to purchase medical evacuation insurance.  Some basic reminders to review with your patients:    Choose safe vehicles and avoid motorbikes when possible.    Wear a seatbelt or a helmet at all times.    Do not drive after drinking alcohol or ride with someone who has been drinking.    Avoid driving at night; street lighting in certain parts of [facility] may be poor.    If they will be driving, remind them to get any driving permits and insurance they may need. It is recommended to get an International Driving Permit (IDP).    Avoid using local, unscheduled aircraft, and fly on larger planes (more than 30 seats) when possible    11- Maintain personal security  Travelers should be reminded on how to protect their personal safety during travel, regardless of their destination.   The [place] Department of State has an extensive website with safety information for international travelers, travel alerts and warnings, and country-specific information. Travelers should be directed to the Department of State resources for information and tips on safe travel.  Stay abreast of current events, particularly those that could pose a safety or health problem for travelers. You can also receive updates on new travel alerts and warnings from the [place] Department of State by subscribing to their [employer] feeds.    If you are bitten by an animal, vigorously scrub the wound for 10 minutes with soap and water. Seek medical attention within 24 hours. If you have had the rabies vaccine series you will need booster shots (usually widely available). If you are unimmunized you will need the vaccine AND Rabies Immune Globulin (RIG). RIG has limited availability in  the developing world, and urgent evacuation to a center is usually needed to obtain it. The sooner the vaccine and RIG are administered the more effective. Rabies is 100% fatal once symptoms set in, and even minor exposures demand full prevention measures.    It is recommended that you bring the following items along with you during your travels:  Medical supplies: Purell Hand    Sun screen: SPF 30 or above  Mosquito net: Permethrin-treated bed net, if needed  Mosquito repellants: Ultrathon Lotion 34% DEET  Cristobal Permethrin Soak Treatment Kit   Cristobal Permethrin Pump Spray   Jain Controlled Release Lotion (DEET)      Make copies of passport: 1 for traveling , 1 for friends or family residing in the [place].     Helpful Web Sites:  1. Centers for Disease Control and Prevention - Travelers' Health: http://wwwnc.cdc.gov/travel  2. [place] Department of State - Travel: http://www.state.gov/travel/  3. [place] Department of State - Smart Traveler Enrollment Program (STEP): https://step.state.gov/step/  For more information,visit CDC.GOV

## 2021-06-06 NOTE — TELEPHONE ENCOUNTER
Please tell her that rifaximin is not covered by insurance, but I did send a azithromycin to take for traveler's diarrhea, if she decided to take it to take it with food to minimize stomach upset.

## 2021-06-06 NOTE — TELEPHONE ENCOUNTER
New Appointment Needed  What is the reason for the visit:    Same Date/Next Day Appt Request  What is the reason for your visit?: Test results    Provider Preference: PCP only  How soon do you need to be seen?: today  Waitlist offered?: No  Okay to leave a detailed message:  Yes,           The patient is very upset and thinks there might be a mix up in her lab work.  She would like to speak or see Dr. Walsh right away.

## 2021-06-06 NOTE — TELEPHONE ENCOUNTER
Who is calling:  Patient   Reason for Call:  Patient is questioning when and where should she  the Kit for stool culture . Patient would like to  today please call her with information .  Date of last appointment with primary care: 02/17/2020  Okay to leave a detailed message: No

## 2021-06-06 NOTE — PROGRESS NOTES
Assessment and Plan:       Adalgisa was seen today for annual wellness visit, spot on back check, upset stomach with tired and tsh check.    Routine general medical examination at a health care facility  Consider shingles/ pneumonia vaccines.    She is due for a colonoscopy this year and will call when she returns from [place].  Recommend she follow through with dexa/ mammogram.    Acquired hypothyroidism- Recheck TFTs  -     Thyroid Cascade  -     T3 (Triiodothyronine), Free  -     T4, Free    Malaise- Etiology unclear.  May be a viral gastroenteritis.  Could try SBI Protect in addition to her usual supplements.  Will check lab work.  -     HM1(CBC and Differential)  -     Comprehensive Metabolic Panel  -     HM1 (CBC with Diff)    Vitamin D deficiency  -     Vitamin D, Total (25-Hydroxy)    Sebaceous cyst on back - Sebum was squeezed from the cyst. She may need excision in future if it does not resolve.    The patient's current medical problems were reviewed.    I have had an Advance Directives discussion with the patient.  The following health maintenance schedule was reviewed with the patient and provided in printed form in the after visit summary:   Health Maintenance   Topic Date Due     HEPATITIS C SCREENING  1950     LIPID  03/09/1996     ZOSTER VACCINES (1 of 2) 03/09/2001     PNEUMOCOCCAL IMMUNIZATION 65+ LOW/MEDIUM RISK (1 of 2 - PCV13) 03/09/2016     DXA SCAN  03/09/2016     INFLUENZA VACCINE RULE BASED (1) 06/30/2020 (Originally 8/1/2019)     MEDICARE ANNUAL WELLNESS VISIT  02/17/2021     FALL RISK ASSESSMENT  02/17/2021     MAMMOGRAM  02/28/2021     ADVANCE CARE PLANNING  02/17/2025     COLONOSCOPY  04/09/2025     TD 18+ HE  02/03/2030     DEPRESSION ACTION PLAN  Completed        Subjective:   Chief Complaint: Adalgisa Dennis is an 69 y.o. female here for an Annual Wellness visit.   HPI:  1) Hypothyroidism - Went up on her thyroid medicine.  Would like to have TSH checked.  2) Has lesion on  her back she would like evaluated.  3) Went to [place] and returned with upset stomach.  Started with upset stomach then came back.  Has had fatigue.  Stomach is queasy.  Has sore ear on right side of head.  No congestion.  Feels very tired.  No diarrhea.  No cough.  Wants me to recommend a supplement as going to [place] soon for several weeks.  4) Still seeing Dr. Bernarda Zenker for chronic tic borne infection.  On a number of supplements including herbal antibiotics.    Review of Systems:     Please see above.  The rest of the review of systems are negative for all systems.    Patient Care Team:  Melissa Walsh MD as PCP - General (Family Medicine)  Melissa Walsh MD as Assigned PCP     Patient Active Problem List   Diagnosis     Acquired hypothyroidism     Atrial fibrillation (H)     Cyst of thyroid     DDD (degenerative disc disease), lumbosacral     Dysthymia     Digestive disorder     Chronic fatigue     Past Medical History:   Diagnosis Date     Lyme disease       Past Surgical History:   Procedure Laterality Date     APPENDECTOMY  1965     THYROIDECTOMY  2006      Family History   Problem Relation Age of Onset     Breast cancer Sister      Bipolar disorder Sister      Stroke Father 87     No Medical Problems Maternal Grandmother      No Medical Problems Maternal Grandfather      No Medical Problems Paternal Grandmother      Cancer Paternal Grandfather      Hyperlipidemia Sister      No Medical Problems Half Brother      Diabetes Other       Social History     Socioeconomic History     Marital status: Single     Spouse name: Not on file     Number of children: Not on file     Years of education: Not on file     Highest education level: Not on file   Occupational History     Not on file   Social Needs     Financial resource strain: Not on file     Food insecurity:     Worry: Not on file     Inability: Not on file     Transportation needs:     Medical: Not on file     Non-medical:  Not on file   Tobacco Use     Smoking status: Former Smoker     Types: Cigarettes     Smokeless tobacco: Never Used     Tobacco comment: in 20's and 30's   Substance and Sexual Activity     Alcohol use: No     Drug use: No     Sexual activity: Not on file   Lifestyle     Physical activity:     Days per week: Not on file     Minutes per session: Not on file     Stress: Not on file   Relationships     Social connections:     Talks on phone: Not on file     Gets together: Not on file     Attends Taoist service: Not on file     Active member of club or organization: Not on file     Attends meetings of clubs or organizations: Not on file     Relationship status: Not on file     Intimate partner violence:     Fear of current or ex partner: Not on file     Emotionally abused: Not on file     Physically abused: Not on file     Forced sexual activity: Not on file   Other Topics Concern     Not on file   Social History Narrative     Not on file      Current Outpatient Medications   Medication Sig Dispense Refill     ascorbic acid, vitamin C, (VITAMIN C) 1000 MG tablet Take 1,000 mg by mouth.       cholecalciferol, vitamin D3, (VITAMIN D3) 5,000 unit Tab Take 10,000 Units by mouth daily.       ELIQUIS 5 mg Tab tablet Take 5 mg by mouth 2 (two) times a day.  3     levothyroxine (SYNTHROID, LEVOTHROID) 112 MCG tablet TAKE 1 TABLET BY MOUTH 5 DAYS A WEEK 90 tablet 3     levothyroxine (SYNTHROID, LEVOTHROID) 125 MCG tablet TAKE 1 TABLET BY MOUTH 3 DAYS A WEEK 52 tablet 1     LORazepam (ATIVAN) 0.5 MG tablet Take 1 tablet (0.5 mg total) by mouth daily as needed for anxiety. 30 tablet 0     multivitamin (ONE A DAY) per tablet Alpha Base without iron Multi-takes daily       OMEGA-3/DHA/EPA/FISH OIL (FISH OIL-OMEGA-3 FATTY ACIDS) 300-1,000 mg capsule Take 1 g by mouth daily.       propranolol (INDERAL) 20 MG tablet Take 1 tablet (20 mg total) by mouth daily. 90 tablet 2     No current facility-administered medications for this  "visit.       Objective:   Vital Signs:   Visit Vitals  /60 (Patient Site: Left Arm, Patient Position: Sitting, Cuff Size: Adult Regular)   Pulse 60   Temp 97.6  F (36.4  C) (Oral)   Resp 16   Ht 5' 5\" (1.651 m)   Wt 164 lb (74.4 kg)   LMP 01/28/2006   SpO2 100%   BMI 27.29 kg/m         VisionScreening:  No exam data present     PHYSICAL EXAM  /60 (Patient Site: Left Arm, Patient Position: Sitting, Cuff Size: Adult Regular)   Pulse 60   Temp 97.6  F (36.4  C) (Oral)   Resp 16   Ht 5' 5\" (1.651 m)   Wt 164 lb (74.4 kg)   LMP 01/28/2006   SpO2 100%   BMI 27.29 kg/m    No acute distress  HEENT: Head atraumatic / normocephalic.  PERRL.  Conjunctiva clear. Nose with no discharge.  Tympanic membranes grey with normal landmarks.  OP - pink and moist.  Normal dentition.  Neck: Supple.  No lymphadenopathy or thyromegaly.  Lungs: CTA.  No retractions or tachypnea.  CV: RRR. S1 and S2 normal.  No murmurs / rubs / gallops.  No lower extremity edema.    Abdomen: Soft. Non tender. Non distended.  No HSM or masses.  Breasts: Symmetric with normal shape and contour.  No lumps or masses.  No axillary lymphadenopathy.  Skin: Back with 2 cm lump with central pore with sebum  Neuro: AAOx3.  Normal strength and tone.  Normal gait.  DTRs in lower extremities equal bilaterally.  Psych: Mood and affect normal.  Good eye contact.  Normal speech.     Assessment Results 2/17/2020   Activities of Daily Living No help needed   Instrumental Activities of Daily Living No help needed   Get Up and Go Score Less than 12 seconds   Mini Cog Total Score 4   Some recent data might be hidden     A Mini-Cog score of 0-2 suggests the possibility of dementia, score of 3-5 suggests no dementia    Identified Health Risks:     Information on urinary incontinence and treatment options given to patient.  The patient's PHQ-9 score is consistent with mild depression. Patient's advanced directive was discussed and I am comfortable with the " patient's wishes.    Melissa Walsh

## 2021-06-06 NOTE — TELEPHONE ENCOUNTER
I spoke with pt by phone on 2/26 and discussed H pylori lab test.  I recommended treatment since she is symptomatic.  Discussed treatment protocols - 2 weeks of antibiotics and PPI.  She wants to discuss with Dr. Zenker and think about this first.  Will come in for recheck of her thyroid labs and CBC today.

## 2021-06-06 NOTE — TELEPHONE ENCOUNTER
Called pt.  She is concerned about elevated TSH.  Has been taking her levothyroxine 125 mcg 3 days a week/ 112 mcg 4 days a week regularly in am.  On a number of supplements prescribed by Dr. Zenker.      Just started SBI Protect.  Having gas / flatus.  No epigastric pain.  Stools normal.  Dark but taking charcoal binder.    I recommend she consider low FODMAP diet/ rm / fennel.  Call Dr. Zenker to discuss supplements. I will try to touch bases with her also.

## 2021-06-06 NOTE — TELEPHONE ENCOUNTER
Patient dropped off a [facility] physician's release form that needs to be completed by PCP and faxed.    Fax: [phone]    Please call patient if you have any questions, thanks!

## 2021-06-07 NOTE — TELEPHONE ENCOUNTER
Refill Approved    Rx renewed per Medication Renewal Policy. Medication was last renewed on 3/17/20.    Iesha Ortiz, [facility] Triage/Med Refill 4/9/2020     Requested Prescriptions   Pending Prescriptions Disp Refills     levothyroxine (SYNTHROID, LEVOTHROID) 125 MCG tablet [Pharmacy Med Name: LEVOTHYROXINE SODIUM 125  TAB] 30 tablet 11     Sig: TAKE 1 TABLET BY MOUTH DAILY       Thyroid Hormones Protocol Passed - 4/8/2020  5:51 PM        Passed - Provider visit in past 12 months or next 3 months     Last office visit with prescriber/PCP: 10/2/2019 Melissa Walsh MD OR same dept: 2/3/2020 Dileep Hay MD OR same specialty: 2/3/2020 Dileep Hay MD  Last physical: 2/17/2020 Last MTM visit: Visit date not found   Next visit within 3 mo: Visit date not found  Next physical within 3 mo: Visit date not found  Prescriber OR PCP: Melissa Walsh MD  Last diagnosis associated with med order: 1. Hypothyroidism  - levothyroxine (SYNTHROID, LEVOTHROID) 125 MCG tablet [Pharmacy Med Name: LEVOTHYROXINE SODIUM 125  TAB]; TAKE 1 TABLET BY MOUTH DAILY  Dispense: 30 tablet; Refill: 11    If protocol passes may refill for 12 months if within 3 months of last provider visit (or a total of 15 months).             Passed - TSH on file in past 12 months for patient age 12 & older     TSH   Date Value Ref Range Status   03/10/2020 5.92 (H) 0.30 - 5.00 uIU/mL Final

## 2021-06-10 NOTE — PROGRESS NOTES
Assessment/Plan:        Diagnoses and all orders for this visit:    Insect bite of lower back, initial encounter  Could be chiggers or mosquito bites, doesn't have the appearance of erythema migrans or tick bites. I feel bedbugs are unlikely given her history.   Treat with over the counter Hydrocortisone 1% twice daily and likely bites are self limiting.   Lyme neuropathy  Follows with PCP    Follow up with PCP if worsening symptoms.           Subjective:    Patient ID: Adalgisa Dennis is a 70 y.o. female.    HPI Patient is here to be evaluated for bug bites on her arms and trunk. She was camping in [facility] [place] [facility] over the weekend and when she returned she noticed multiple bug bites on her arms posterior back near her waistline and several more on her right side. She has a history of Chronic lyme's disease which has caused her many symptoms over the past couple of years. She states that she gets worried when she has bug bites and would like me to identify them. She doesn't believe that she was bit by a tick. No other larger rashes. She denies fevers, chills, body aches, malaise. The bug bites do itch. She does say that she was in [facility] [place] and wonder if they could be chiggers or bedbugs. She states that she stayed in a tent with her own sleeping bag. She also washed her own bedding and didn't find any bedbugs.    The following portions of the patient's history were reviewed and updated as appropriate: allergies, current medications, past family history, past medical history, past social history, past surgical history and problem list.    Review of Systems   Skin:        Multiple insect bites on bilateral arms, posterior back near belt line and right side   All other systems reviewed and are negative.            Objective:    Physical Exam  /52 (Patient Site: Left Arm, Patient Position: Sitting, Cuff Size: Adult Regular)   Pulse (!) 59   Temp 98.6  F (37  C) (Oral)   Resp 20    Wt 141 lb 6.4 oz (64.1 kg)   LMP 01/28/2006   BMI 23.53 kg/m    General: Patient appears to be in  acute distress.  Skin: has multiple erythematous annual lesions that have the appears of insect bites on bilateral arms, lower back near belt line and right side.

## 2021-06-12 NOTE — TELEPHONE ENCOUNTER
Please call [facility]- pt's functional medicine physician, Dr. Bernarda Zenker, recommended this switch due to concern of sensitivity to fillers in conventional levothyroxine. Please have [facility] call Dr. Zenker if they have further questions.

## 2021-06-12 NOTE — PROGRESS NOTES
SUBJECTIVE: Adalgisa Dennis is a 67 y.o. female with:  Chief Complaint   Patient presents with     Fatigue     Depression     Establish Care      PCP cyndee laurent    She wishes to discuss her extreme fatigue and depression. She has had a lot of recent changes in her life in the last few years.  She had her marriage end 4 years ago and also moved.  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  She could not sustain any work more than 1 hour at a time.  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  She recently decided to retire.  Her divorce was finalized in the last year and she had to move again and then had a death in her family.  She began to feel depressed in the spring and her mood is still down. She had another drop in her energy level in the last month of May and also more recently.  She has also been physically ill with 2 bladder infections / sinus infection in the last several months. She did see an integrative practitioner at [facility] this summer who did some standard lab work with no significant findings.  She was started on 5-HTP which she is taking at bedtime for the last 2 months. She does feel it is helping a little with her mood.  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  She has consider citalopram but is going to [place] and does not want to start a new medicine.  She tried Prozac and Wellbutrin in the past and had side effects from both medicines.  She is frustrated by her cycles of fatigue.  She denies any potential toxic exposures.  She wonders about food sensitivities.  She did see cardiology for her fatigue and had a normal echo / ECG.  She is planning to do an exercise stress test when she returns from the [place]    PMH:   1)She had mono in 2000 and fatigue lasted 1 1/2 years.  2)She has paroxysmal a fib and takes propranolol PRN.  3) She has had life long eating disorder - tends to  "overeat when she is stressed.  4) Hypothyroidism on levothyroxine.  Last TSH this summer was normal.     SH:  Born in [place] by .  Father was Marine.  Mother was diagnosed schizophrenia.  Hospitalized for gastroenteritis in early childhood.  Grew up outside [facility].  Went to [employer].  Traveled a lot and moved back to [place].   for 20 years and recently got .  Adopted daughter in school on [employer] [place].  Retired for 1 year.    Lifestyle: exercise- yoga/ pilates 2-3 x a week. Diet- seeing Gabbi Austin, nutritionist, and following elimination ( ketogenic ) diet with no grains.  No caffeine or alcohol.  She does practice some mindfulness techniques.  She does have a lot of plans to volunteer and has projects she wants to work on in her group home.  Supplements: 5  mg at night    ROS: See scanned medical symptoms questionnaire    OBJECTIVE: /60 (Patient Site: Left Arm, Patient Position: Sitting, Cuff Size: Adult Regular)  Pulse 72  Resp 12  Ht 5' 5\" (1.651 m)  Wt 157 lb 12 oz (71.6 kg)  LMP 2006  Breastfeeding? No  BMI 26.25 kg/m2   No acute distress.  HEENT: Head is atraumatic and/normocephalic.  PERRL.  Conjunctiva clear.  Tympanic membranes grey with normal landmarks and normal light reflexes.  No nasal discharge.  Oropharynx is pink and moist.  Sinuses nontender.  Neck: Supple.  No lymphadenopathy or thyromegaly.  Lungs: Clear to auscultation.  No wheezing, retractions, or tachypnea.  Heart: RRR. S1 and S2 normal.  No murmurs, rubs, or gallops.  Neuro: Awake, alert, oriented x 3.  Normal strength and tone.  Normal gait.   Psych Exam:  Mood: depressed  Affect: flat   Behavior: appropriate  ThoughtContent: logical  Judgement: appropriate  Insight: normal     I reviewed recent labs from [facility] on Care Everywhere.    Adalgisa was seen today for fatigue, depression and establish care.    Diagnoses and all orders for this visit:    Fatigue- " Etiology unclear but I suspect related to prolonged chronic stress.  I did discuss doing a salivary cortisol test and she agrees.  I would like to start her on a multivitamin for now and f/u after testing has returned.  -     multivitamin with minerals tablet; ProThera VitaPrime Iron-free multivitamins with minerals 1 twice daily    Major depression, recurrent- Some slight improvement with 5- HTP.  Will increase dose and if no response, could consider JOSE G-E/ [place]'s Wort or CereVive.  Take MVI/ fish oil.    Patient Instructions   Ortho[employer]    Salivary cortisol test    Consider increasing 5 HTP to 200 mg at night    45 minutes spent with the patient.  Over 50% were spent in counseling and coordination of care.         Melissa Walsh

## 2021-06-12 NOTE — PROGRESS NOTES
"Adalgisa Dennis is a 70 y.o. female who is being evaluated via a billable telephone visit.      The patient has been notified of following:     \"This telephone visit will be conducted via a call between you and your physician/provider. We have found that certain health care needs can be provided without the need for a physical exam.  This service lets us provide the care you need with a short phone conversation.  If a prescription is necessary we can send it directly to your pharmacy.  If lab work is needed we can place an order for that and you can then stop by our lab to have the test done at a later time.    Telephone visits are billed at different rates depending on your insurance coverage. During this emergency period, for some insurers they may be billed the same as an in-person visit.  Please reach out to your insurance provider with any questions.    If during the course of the call the physician/provider feels a telephone visit is not appropriate, you will not be charged for this service.\"    Patient has given verbal consent to a Telephone visit? Yes    What phone number would you like to be contacted at? [phone]     Patient would like to receive their AVS by AVS Preference: Javon.    Additional provider notes: no     SUBJECTIVE: Adalgisa Dennis is a 70 y.o. female with:  Chief Complaint   Patient presents with     Test Results     f/u    She is here to f/u with her hypothyroidism. She has continued to see Dr. Bernarda Zenker for functional medicine treatment. She was tested for mold toxicity and her urine test was positive in July.  She had her house tested for air quality.  She had major mold remediation done on her house.  She started on cholestyramine / NAC / GI Detox.  The first time she did the treatment she felt flu-like so cut back on some of the supplements.  In the last few weeks, she has felt more exhausted.  Taking 4 hour long naps.      She takes levothyroxine 137 mcg with Eliquis " when she wakes up at 6 am in the morning. Waits 1 hour then takes cholestyramine. She increased her thyroid medicine from 125 to 137 mcg in the early summer due to TSH level of 6 but this is the first time she had thyroid labs retested.    She has had chronic sinus issues for many years.  Her diet has been very clean.  No alcohol / grains / sugar.  She was eating a veggie burger daily for months.  She has had a post nasal drip for awhile.  In the last few days she has sinus pain in her ear.  She does not think she needs antibiotics at this point.    OBJECTIVE: no distress    Recent Results (from the past 240 hour(s))   COVID-19 VIRUS PCR MRF    Specimen: Nasopharyngeal   Result Value Ref Range    COVID-19 VIRUS SPECIMEN SOURCE Nasopharyngeal     2019-nCOV Not Detected    Thyroid Cascade   Result Value Ref Range    TSH 20.11 (H) 0.30 - 5.00 uIU/mL   T4, Free   Result Value Ref Range    Free T4 0.9 0.7 - 1.8 ng/dL         Assessment/Plan:  1. Acquired hypothyroidism  I think the cholestyramine may be affecting absorption of her levothyroxine.  We discuss a plan to separate dosing by longer time and close f/u of thyroid labs.  I also recommend she discuss with Dr. Zenker who is prescribing the cholestyramine.  - T3 (Triiodothyronine), Free; Future  - T4, Free; Future  - Thyroid Stimulating Hormone (TSH); Future    2. Chronic sinusitis, unspecified location  Trial of Ion sinus spray.    Patient Instructions   Consider trying Ion Sinus spray - [email or web address]    Move the cholestyramine dose to later in the morning and return in 4 weeks to recheck the thyroid labs           Phone call duration:  30 minutes    MICA Parra

## 2021-06-12 NOTE — PATIENT INSTRUCTIONS - HE
Consider trying Ion Sinus spray - [email or web address]    Move the cholestyramine dose to later in the morning and return in 4 weeks to recheck the thyroid labs

## 2021-06-12 NOTE — TELEPHONE ENCOUNTER
Please either call or fax over prescription for compound levothyroxine ( see recent letter ) then let pt know via My Chart:    [facility] phone [phone] or fax [phone].    Melissa Walsh

## 2021-06-12 NOTE — TELEPHONE ENCOUNTER
Medication Question or Clarification  Who is calling: Pharmacist Alex from [facility] needs a diagnosis or a reason why you need a compound for Levothroxie 137 because it is commercially manufactured in that strength   What medication are you calling about (include dose and sig)?: See above   Who prescribed the medication?: Nico   What is your question/concern?: See above   Requested Pharmacy: [facility]   Okay to leave a detailed message?:Yes

## 2021-06-12 NOTE — TELEPHONE ENCOUNTER
Pharmacist notified per MD ge below. The patient verbalizes understanding of provider/CSS instructions for follow-up and continued care per provider message.

## 2021-06-12 NOTE — TELEPHONE ENCOUNTER
Refill Approved    Rx renewed per Medication Renewal Policy. Medication was last renewed on 6/30/2020.    Vicki Negron, [facility] Triage/Med Refill 10/5/2020     Requested Prescriptions   Pending Prescriptions Disp Refills     levothyroxine (SYNTHROID, LEVOTHROID) 137 MCG tablet [Pharmacy Med Name: LEVOTHYROXINE SOD 137MCG 137 Tablet] 90 tablet 0     Sig: TAKE 1 TABLET (137 MCG TOTAL) BY MOUTH DAILY.       Thyroid Hormones Protocol Passed - 10/2/2020  9:18 AM        Passed - Provider visit in past 12 months or next 3 months     Last office visit with prescriber/PCP: 10/2/2019 Melissa Walsh MD OR same dept: 2/3/2020 Dileep Hay MD OR same specialty: 8/19/2020 Sade Rahman, CNP  Last physical: 2/17/2020 Last MTM visit: Visit date not found   Next visit within 3 mo: Visit date not found  Next physical within 3 mo: Visit date not found  Prescriber OR PCP: Melissa Walsh MD  Last diagnosis associated with med order: 1. Acquired hypothyroidism  - levothyroxine (SYNTHROID, LEVOTHROID) 137 MCG tablet [Pharmacy Med Name: LEVOTHYROXINE SOD 137MCG 137 Tablet]; Take 1 tablet (137 mcg total) by mouth daily.  Dispense: 90 tablet; Refill: 0    If protocol passes may refill for 12 months if within 3 months of last provider visit (or a total of 15 months).             Passed - TSH on file in past 12 months for patient age 12 & older     TSH   Date Value Ref Range Status   05/21/2020 6.05 (H) 0.30 - 5.00 uIU/mL Final

## 2021-06-13 NOTE — PROGRESS NOTES
"SUBJECTIVE: Adalgisa Dennis is a 67 y.o. female with:  Chief Complaint   Patient presents with     Follow-up     test done in September     Medication Refill     possible     Establish Care     possible?    She returned from the [place] 2 weeks ago.  She had illness last week with low energy/ sinus pain / congestion.  Doing better now.  The trip was very good and helped her sort out what she wants to do going forward.  She retired but has realized she still wants to work but doing something different.    She has atrial fibrillation and saw her integrative cardiologist at [facility], Dr. Brigida Day.  She is taking propranolol 20 mg 2 tabs PRN.  Her cardiologist recommended ashwaganda / vitamin D3 / acupuncture / guided imagery a couple of weeks ago and she did start on the supplements.  She has had 2 episodes of A fib in the last few days which were frustrating.  She felt the propranolol did not help.  She did not go to ER but was thinking about it.  She feels episodes may be tied to stress. She has been dealing with some emotional issues related to recent divorce.    Her mood is improved but she still gets sad at times.  Her energy has been better.  She is taking 5- mg at bedtime and sleeping ok. She has started MVI and continue on fish oil.  She has explored spirituality quite a bit and has done a lot of work with [employer].  She is doing some counseling but not sure how effective that has been.    Hypothyroidism- she continues on levothyroxine for her hypothyroidism.    Patient Active Problem List   Diagnosis     Acquired hypothyroidism     Atrial fibrillation     Cyst of thyroid     DDD (degenerative disc disease), lumbosacral     Dysthymia        OBJECTIVE: BP 90/60 (Patient Site: Left Arm, Patient Position: Sitting, Cuff Size: Adult Regular)  Pulse 68  Resp 16  Ht 5' 5\" (1.651 m)  Wt 157 lb (71.2 kg)  LMP 01/28/2006  Breastfeeding? No  BMI 26.13 kg/m2 no distress  Psych " Exam:  Mood: depressed  Affect: normal   Behavior: appropriate  ThoughtContent: logical  Judgement: appropriate  Insight: normal    PHQ9: 6    AM cortisol- 12.3  RT3- 24      Adalgisa was seen today for follow-up, medication refill and establish care.    Diagnoses and all orders for this visit:    Dysthymia- She is better but still not completely in remission.  Will continue with 5-HTP for now.  We talked about doing Gene Sight pharmacogenetic testing before considering any psychotropic medicines.  We discussed considering increasing the dose of 5-HTP vs switching to another supplement.    Situational stress - Her RT3 is high reflecting chronic stress.  I agree with the ashwaganda / acupuncture / guided imagery.  We also talked about other types of mind-body therapies.      Atrial fibrillation - She could go up on propranolol to 60 mg daily.  Consider magnesium at bedtime.  F/U with cardiology.      Patient Instructions   Continue on the 5-HTP and ashwaganda     Add in magnesium glycinate 400 mg or Natural Calm at bedtime    Vitamin C - 750-1000 mg daily     Consider Gene Sight testing    Bach's Rescue Remedy        Update me in a few weeks.     Melissa Walsh

## 2021-06-14 NOTE — PROGRESS NOTES
SUBJECTIVE: Adalgisa Dennis is a 67 y.o. female with:  Chief Complaint   Patient presents with     Sinus Problem     pt states on and off sinus issue, possible chronic sinus      Fatigue     pt states extreme fatigue, energy not coming back     She had some sinus congestion around Thanksgiving.  She felt tired and slept a lot.  She had rhinorrhea / congestion / coughing up thick sputum/ pain in both ears.  She got better without any antibiotics.  Now symptoms are recurrent.  She has pain in the ears and feels fatigue. No fever. She has slight sore throat in am.  No face pain / post nasal drip.  No headache or myalgias. No seasonal allergies.  Her fatigue has been going on since May.  She is taking homeopathic remedy.  She was doing a sinus rinse.    She continues on thyroid replacement.  Her last reverse T3 was very high.  She has been through a lot of stressful events in last year.  She is taking 5-HTP/ ashgawanda and her mood is doing much better.  She continues to be frustrated about her fatigue.    Patient Active Problem List   Diagnosis     Acquired hypothyroidism     Atrial fibrillation     Cyst of thyroid     DDD (degenerative disc disease), lumbosacral     Dysthymia    SH: .  Retired.    OBJECTIVE: BP 92/82 (Patient Site: Right Arm, Patient Position: Sitting, Cuff Size: Adult Regular)  Pulse 66  Resp 16  Wt 152 lb 8 oz (69.2 kg)  LMP 01/28/2006  SpO2 97%  Breastfeeding? No  BMI 25.38 kg/m2   No acute distress.  HEENT: Head is atraumatic and/normocephalic.  PERRL.  Conjunctiva clear.  Tympanic membranes grey with normal landmarks and normal light reflexes.  No nasal discharge.  Oropharynx is pink and moist.  Sinuses tender over right maxilla  Neck: Supple.  No lymphadenopathy or thyromegaly.  Lungs: Clear to auscultation.  No wheezing, retractions, or tachypnea.  Heart: RRR. S1 and S2 normal.  No murmurs, rubs, or gallops.  Neuro: Awake, alert, oriented x 3.  Normal strength and tone.   Normal gait.     Adalgisa was seen today for sinus problem and fatigue.    Diagnoses and all orders for this visit:    Maxillary sinusitis- Will treat with antibiotics.  Take probiotics in between.  Handout on complementary ways to treat sinusitis.  -     amoxicillin-clavulanate (AUGMENTIN) 875-125 mg per tablet; Take 1 tablet by mouth 2 (two) times a day for 7 days.    Fatigue - will recheck labs.  -     T3 (Triiodothyronine), Free  -     T3 (Triiodothyronine), Reverse  -     T4, Free  -     Thyroid Stimulating Hormone (TSH)  -     Vitamin D, Total (25-Hydroxy)  -     C -Reactive Protein, High Sensitivity  -     Comprehensive Metabolic Panel  -     HM1(CBC and Differential)  -     HM1 (CBC with Diff)  -     T3, Total    25 minutes spent with the patient.  Over 50% were spent in counseling and coordination of care.       Melissa Walsh

## 2021-06-15 ENCOUNTER — RECORDS - HEALTHEAST (OUTPATIENT)
Dept: ADMINISTRATIVE | Facility: OTHER | Age: 71
End: 2021-06-15

## 2021-06-15 ENCOUNTER — TRANSFERRED RECORDS (OUTPATIENT)
Dept: HEALTH INFORMATION MANAGEMENT | Facility: CLINIC | Age: 71
End: 2021-06-15

## 2021-06-15 NOTE — TELEPHONE ENCOUNTER
Orders placed for thyroid.  I recommend pt make a follow-up visit with me after labs are done to discuss results and plan going forward.

## 2021-06-15 NOTE — TELEPHONE ENCOUNTER
Pt started new thyroid med and request to have tyroid retested.    She is requesting standing orders for this year.  Please advise on orders

## 2021-06-16 NOTE — TELEPHONE ENCOUNTER
Telephone Encounter by Renée Ugarte at 2/7/2020 12:57 PM     Author: Reéne Ugarte Service: -- Author Type: --    Filed: 2/7/2020 12:58 PM Encounter Date: 2/5/2020 Status: Signed    : Renée Ugarte       PRIOR AUTHORIZATION DENIED    Denial Rational: must have an FDA approved diagnosis and try/fail both Ciprofloxacin and Ofloxacin   Needs to have a diagnosis of active travelers diarrhea        Appeal Information: This medication was denied. If physician would like to appeal because patient has contraindication or allergy to covered medication please write letter of medical necessity and route back to PA team to initiate.  If no further action is needed please close encounter thank you.

## 2021-06-16 NOTE — TELEPHONE ENCOUNTER
CALLED AND LM FOR SONG TO CB. SHE NEEDS TO SCHEDULE LAB APPT FOR REPEAT LAB DRAW DUE TO PREVIOUS TEST BEING QNS TO RUN.

## 2021-06-16 NOTE — TELEPHONE ENCOUNTER
Adalgisa was a difficult draw from yesterday so we sent what little blood we could get from her to our reference lab.  They just called and said they had enough to run everything but the EBV tests.  She will have to be redrawn for those.  Would you like lab to call her back for a redraw?

## 2021-06-16 NOTE — PROGRESS NOTES
Assessment and Plan:     Patient has been advised of split billing requirements and indicates understanding: Yes  Adalgisa was seen today for annual wellness visit and medication refill.    Routine general medical examination at a health care facility  Up to date with colonoscopy and mammogram.  Declines further vaccines at this time.  Will bring in her ACP document.    Paroxysmal atrial fibrillation (H)  -     propranoloL (INDERAL) 20 MG tablet; Take 40 - 60 mg once daily as needed  -     JOSELO Monitor Hook-Up; Future    Anxiety- Controlled substance contract signed.  #30 to last 2-3 months.  -     LORazepam (ATIVAN) 0.5 MG tablet; Take 1 tablet (0.5 mg total) by mouth daily as needed for anxiety.  I recommend trial of CopaCalm and CereVive by [facility] for her mood.    Acquired hypothyroidism  -     levothyroxine (SYNTHROID, LEVOTHROID) 137 MCG tablet; Take 1 tablet (137 mcg total) by mouth daily.  -     T3 (Triiodothyronine), Free  -     T4, Free  -     Thyroid Stimulating Hormone (TSH)    Chronic fatigue  -     Comprehensive Metabolic Panel  -     HM2(CBC w/o Differential)  -     Magnesium, Red Blood Cell  -     Sabino-Barr Virus (EBV) Antibodies, IgG  -     Sabino-Barr Virus (EBV) Capsid Antibody,IGM(EBVCAM)  -     Sabino-Barr Virus by Quantitative PCR    Encounter for screening for other metabolic disorders  -     Lipid Cascade        The patient's current medical problems were reviewed.    I have had an Advance Directives discussion with the patient.  The following health maintenance schedule was reviewed with the patient and provided in printed form in the after visit summary:   Health Maintenance Due   Topic Date Due     HEPATITIS C SCREENING  Never done     DEXA SCAN  Never done     LIPID  Never done     ZOSTER VACCINES (1 of 2) Never done     Pneumococcal Vaccine: 65+ Years (1 of 1 - PPSV23) Never done     INFLUENZA VACCINE RULE BASED (1) Never done        Subjective:   Chief Complaint: Adalgisa  SANTANA Dennis is an 71 y.o. female here for an Annual Wellness visit.   HPI:  1) Having more depression lately.  Anxiety has also gotten worse.  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  Feeling stressed.  2) Fatigue - Working with functional medicine doctor.  Has been treated for mold toxicity.  She had mono for 1-2 years when she was 52 yo.  Wonders if she has persistent EBV.  3) Hypothyroidism - TSH has been on normal range.  4) Left eye has some blurred vision with pain in muscles left side of eye.  5) ? A fib started up in last month.  She has feeling of irregular beats and feeling of feeling faint.  No chest pain / shortness of breath.  Occurs once a week.  Will last for few minutes. No syncope.    Review of Systems:    Please see above.  The rest of the review of systems are negative for all systems.    Patient Care Team:  Melissa Walsh MD as PCP - General (Family Medicine)  Dr. Bernarda Zenker Clevenger, Bernadette Lee, MD as Assigned PCP     Patient Active Problem List   Diagnosis     Acquired hypothyroidism     Atrial fibrillation (H)     Cyst of thyroid     DDD (degenerative disc disease), lumbosacral     Dysthymia     Digestive disorder     Chronic fatigue     Lyme neuropathy     Past Medical History:   Diagnosis Date     Lyme disease       Past Surgical History:   Procedure Laterality Date     APPENDECTOMY  1965     THYROIDECTOMY  2006      Family History   Problem Relation Age of Onset     Breast cancer Sister      Bipolar disorder Sister      Stroke Father 87     No Medical Problems Maternal Grandmother      No Medical Problems Maternal Grandfather      No Medical Problems Paternal Grandmother      Cancer Paternal Grandfather      Hyperlipidemia Sister      No Medical Problems Half Brother      Diabetes Other       Social History     Socioeconomic History     Marital status: Single     Spouse name: Not on file     Number of children: Not on file     Years of education: Not on file     Highest  education level: Not on file   Occupational History     Not on file   Social Needs     Financial resource strain: Not on file     Food insecurity     Worry: Not on file     Inability: Not on file     Transportation needs     Medical: Not on file     Non-medical: Not on file   Tobacco Use     Smoking status: Former Smoker     Types: Cigarettes     Smokeless tobacco: Never Used     Tobacco comment: in 20's and 30's   Substance and Sexual Activity     Alcohol use: No     Drug use: No     Sexual activity: Not on file   Lifestyle     Physical activity     Days per week: Not on file     Minutes per session: Not on file     Stress: Not on file   Relationships     Social connections     Talks on phone: Not on file     Gets together: Not on file     Attends Restoration service: Not on file     Active member of club or organization: Not on file     Attends meetings of clubs or organizations: Not on file     Relationship status: Not on file     Intimate partner violence     Fear of current or ex partner: Not on file     Emotionally abused: Not on file     Physically abused: Not on file     Forced sexual activity: Not on file   Other Topics Concern     Not on file   Social History Narrative     Not on file      Current Outpatient Medications   Medication Sig Dispense Refill     ascorbic acid, vitamin C, (VITAMIN C) 1000 MG tablet Take 4,000 mg by mouth.        ashwagandha root extract 300 mg cap Take 2 capsules by mouth.       cholecalciferol, vitamin D3, (VITAMIN D3) 5,000 unit Tab Take 5,000 Units by mouth daily.        coQ10, ubiquinol, 100 mg cap Take 200 mg by mouth.       ELIQUIS 5 mg Tab tablet Take 5 mg by mouth 2 (two) times a day.  3     ketoconazole (NIZORAL) 2 % shampoo as needed.       liothyronine (CYTOMEL) 5 MCG tablet 2 (two) times a day.       melatonin 3 mg Tab tablet Take 3 mg by mouth daily.       multivitamin (ONE A DAY) per tablet Alpha Base without iron Multi-takes daily       OMEGA-3/DHA/EPA/FISH OIL (FISH  "OIL-OMEGA-3 FATTY ACIDS) 300-1,000 mg capsule Take 1 g by mouth daily.       CHOLESTYRAMINE LIGHT 4 gram Powd 2 (two) times a day. 1/2 a scoop BID       levothyroxine (SYNTHROID, LEVOTHROID) 137 MCG tablet Take 1 tablet (137 mcg total) by mouth daily. 90 tablet 3     LORazepam (ATIVAN) 0.5 MG tablet Take 1 tablet (0.5 mg total) by mouth daily as needed for anxiety. 30 tablet 0     propranoloL (INDERAL) 20 MG tablet Take 40 - 60 mg once daily as needed 90 tablet 2     No current facility-administered medications for this visit.       Objective:   Vital Signs:   Visit Vitals  /62 (Patient Site: Left Arm, Patient Position: Sitting, Cuff Size: Adult Regular)   Pulse (!) 57   Temp 97.8  F (36.6  C) (Oral)   Resp 16   Ht 5' 4.75\" (1.645 m)   Wt 141 lb 6 oz (64.1 kg)   LMP 01/28/2006   SpO2 98%   BMI 23.71 kg/m           VisionScreening:  No exam data present     PHYSICAL EXAM  /62 (Patient Site: Left Arm, Patient Position: Sitting, Cuff Size: Adult Regular)   Pulse (!) 57   Temp 97.8  F (36.6  C) (Oral)   Resp 16   Ht 5' 4.75\" (1.645 m)   Wt 141 lb 6 oz (64.1 kg)   LMP 01/28/2006   SpO2 98%   BMI 23.71 kg/m    No acute distress  HEENT: Head atraumatic / normocephalic.  PERRL.  Conjunctiva clear. Nose with no discharge.  Tympanic membranes grey with normal landmarks.  OP - pink and moist.  Normal dentition.  Neck: Supple.  No lymphadenopathy or thyromegaly.  Lungs: CTA.  No retractions or tachypnea.  CV: RRR. S1 and S2 normal.  No murmurs / rubs / gallops.  No lower extremity edema.    Abdomen: Soft. Non tender. Non distended.  No HSM or masses.  Breasts: Symmetric with normal shape and contour.  No lumps or masses.  No axillary lymphadenopathy.  Skin: No rashes or lesions.  Neuro: AAOx3.  Normal strength and tone.  Normal gait.  DTRs in lower extremities equal bilaterally.  Psych: Mood and affect normal.  Good eye contact.  Normal speech.     Assessment Results 4/12/2021   Activities of Daily Living No " help needed   Instrumental Activities of Daily Living No help needed   Get Up and Go Score Less than 12 seconds   Mini Cog Total Score 5   Some recent data might be hidden     A Mini-Cog score of 0-2 suggests the possibility of dementia, score of 3-5 suggests no dementia    Identified Health Risks:     The patient was provided with suggestions to help her develop a healthy emotional lifestyle.   The patient's PHQ-9 score is consistent with mild depression. She was provided with information regarding depression.  She is at risk for falling and has been provided with information to reduce the risk of falling at home.  Patient's advanced directive was discussed and I am comfortable with the patient's wishes.    Melissa Walsh

## 2021-06-16 NOTE — PROGRESS NOTES
"Assessment and Plan    1. Fall, initial encounter  Symptoms do not suggest concussion. Adalgisa notes episodic very slight \"double\"  (images border doubled rather than whole image double) , however she has had this in the past.  No loss of consciousness.  Exam normal. Patient reassured    Return in about 1 year (around 4/22/2022) for Annual physical, or earlier as needed.    Dagmar Vickers MD     -------------------------------------------    Chief Complaint   Patient presents with     Concussion     recent fall     Adalgisa has waked on this path many times in the woods with no issue. This past Monday (3 days ago) she tripped on a tree root walking on the trail, fell straight down. Did not lose consciousness.  She said it felt like being hit in the face with a snowball. She took the brunt of the force on her nose, which did not break.  She had no pain afterward, except in her nose.     Before the fall she had some tearing in her left eye; previous cataract surgery and she has had - took a long time to heal, and episodes where left eye sees double vision sometimes.  : where  - for instance - she looked at bus lights and they were  \"off.\" This is happening again and  comes and goes. Stays maybe 10 minutes.  When she was driving she saw a double line on the right.  Noticed it yesterday, and the day before, a little.  Then had very slight headache this am     - no change in fatigue - feels better   - no nausea or vomiting   - she is a little sore on the left side of her head  - no change in cognition    Current Outpatient Medications on File Prior to Visit   Medication Sig Dispense Refill     ascorbic acid, vitamin C, (VITAMIN C) 1000 MG tablet Take 4,000 mg by mouth.        ashwagandha root extract 300 mg cap Take 2 capsules by mouth.       cholecalciferol, vitamin D3, (VITAMIN D3) 5,000 unit Tab Take 5,000 Units by mouth daily.        CHOLESTYRAMINE LIGHT 4 gram Powd 2 (two) times a day. 1/2 a scoop BID       coQ10, " ubiquinol, 100 mg cap Take 200 mg by mouth.       ELIQUIS 5 mg Tab tablet Take 5 mg by mouth 2 (two) times a day.  3     ketoconazole (NIZORAL) 2 % shampoo as needed.       levothyroxine (SYNTHROID, LEVOTHROID) 137 MCG tablet Take 1 tablet (137 mcg total) by mouth daily. 90 tablet 3     liothyronine (CYTOMEL) 5 MCG tablet 2 (two) times a day.       LORazepam (ATIVAN) 0.5 MG tablet Take 1 tablet (0.5 mg total) by mouth daily as needed for anxiety. 30 tablet 0     melatonin 3 mg Tab tablet Take 3 mg by mouth daily.       multivitamin (ONE A DAY) per tablet Alpha Base without iron Multi-takes daily       OMEGA-3/DHA/EPA/FISH OIL (FISH OIL-OMEGA-3 FATTY ACIDS) 300-1,000 mg capsule Take 1 g by mouth daily.       propranoloL (INDERAL) 20 MG tablet Take 40 - 60 mg once daily as needed 90 tablet 2     No current facility-administered medications on file prior to visit.      The history section was last reviewed by Unique Tong CMA on Apr 22, 2021.    Social History     Tobacco Use   Smoking Status Former Smoker     Types: Cigarettes   Smokeless Tobacco Never Used   Tobacco Comment    in 20's and 30's       Social History     Substance and Sexual Activity   Alcohol Use No         Vitals:    04/22/21 1351   BP: 102/60   Pulse: 64   Temp: 98.5  F (36.9  C)   SpO2: 99%     Body mass index is 24.53 kg/m .     EXAM:    General appearance - alert, well appearing, and in no distress  Mental status - normal mood, behavior, speech, dress, motor activity, and thought processes  Eyes - pupils equal and reactive, extraocular eye movements intact, funduscopic exam normal, discs flat and sharp  Ears - bilateral TM's and external ear canals normal  Mouth - mucous membranes moist, pharynx normal without lesions  Neck - supple, no significant adenopathy  Neurological - cranial nerves II through XII intact, DTR's normal and symmetric, normal: rapid alternating finger movements, finger to nose, heel to shin, tandem gaint, heel walk and  toe walk

## 2021-06-17 NOTE — PATIENT INSTRUCTIONS - HE
Patient Instructions by Melissa Walsh MD at 10/2/2019 11:00 AM     Author: Melissa Walsh MD Service: -- Author Type: Physician    Filed: 10/2/2019 11:49 AM Encounter Date: 10/2/2019 Status: Addendum    : Melissa Walsh MD (Physician)    Related Notes: Original Note by Melissa Walsh MD (Physician) filed at 10/2/2019 11:45 AM       BEMER -Your contact:Concha [email or web address]    Here are some additional instructions for the 24 hour salivary cortisol testing:    Preparation prior to testin) It is important to collect the saliva during the specified time frame. If you have trouble producing enough saliva: Rinse mouth with water and spit out.  Press tip of tongue to roof of mouth against teeth. Thinks of sour foods.  2) Do not eat or drink anything except water 1 hour prior to collection.  Remove all lip balm and lipstick.  3) No alcohol 12 hours prior to collection.  4) Do not brush teeth immediately before collection as gums may bleed and contaminate specimen.  5) Rinse mouth with water 10 minutes before collection.    You will be collecting 4 samples in 4 different tubes over a 24 hour period.  The time frames are below:    1) 7:00 am - 9:00 am  2) 11:00 am - 1:00 pm  3) 3:00 pm - 5:00 pm  4) 10:00 pm - 12: am    Follow the collection instructions on the other page then refrigerate samples until you can bring them into the clinic.    Make sure you have labeled each tube properly with the time of collection.    Plunify  Auromere Ayurvedic shampoo with neem

## 2021-06-18 NOTE — PROGRESS NOTES
"SUBJECTIVE: Adalgisa Dennis is a 68 y.o. female with:  Chief Complaint   Patient presents with     Results     f/u    She is here to f/u on Meg testing for salivary cortisol/ GI effects.  She continues to experience fatigue.  She is in the process of buying a house so has been feeling stressed.  She thinks she may have some food sensitivities and she is ready to explore that with diet.    OBJECTIVE: /62 (Patient Site: Right Arm, Patient Position: Sitting, Cuff Size: Adult Regular)  Pulse 69  Resp 16  Ht 5' 5\" (1.651 m)  LMP 01/28/2006  SpO2 99% no distress    Labs:   Lab Results   Component Value Date    WBC 9.2 05/14/2018    HGB 13.9 05/14/2018    HCT 41.7 05/14/2018    MCV 94 05/14/2018     (L) 05/14/2018       Salivary cortisol: High in am / afternoon.  High normal pm.  GI Effects: Elevated IgA.  Elevated protein / fecal fats.  Normal pancreatic elastase.  Low beneficial bacteria.    Adalgisa was seen today for results.    Diagnoses and all orders for this visit:    Adrenal gland dysfunction (H)- She has a pattern of stressed and wired.  I recommend she work on mind-body therapies. She did take MBSR in past so she could do a weekend refresher at [facility].  Will also prescribe synergistic supplement with phosphatidylserine / L-theanine and adaptogenic herbs.    Digestive disorder - She has suggestion of food sensitivities/ poor digestion of fats/ proteins and decreased beneficial bacteria.  We discussed elimination diet and digestive enzyme supplement with probiotics for a couple of months.  I also recommend she not drink fluids with meals and avoid eating when stressed to support proper digestion.    Chronic fatigue- Will see if improving her digestion and balancing adrenals will help.  F/U in 2 months.       Patient Instructions   Here are my recommendations for supplements:    To help balance cortisol levels I recommend Sincere Maynard by [employer]:   Start out taking 2 capsules " daily and can increase to 2 capsules twice a day after 2-3 weeks if no improvement.  I would take this for a couple of months to see if you have more energy and feel more rested.    For your digestive system I recommend Digestzyme- V by [employer]:   Take 1 capsule 15 minutes before a meal.   I would try this for 1-2 months along with the elimination diet.    Make an appointment with Rohini or Yanet from [facility] for a functional medicine consult regarding the comprehensive elimination diet.  This diet is for 3 weeks then you gradually reintroduce the eliminated foods to determine which foods are causing a problem.    To improve your microbiome, I recommend the following probiotic:  OrthoBiotic 1 capsule daily in between meals.  I would take this for 1-2 months.  Also, work on getting more pre and probiotic foods into your diet.    You can purchast the supplements ( all by [employer] ) at [facility].      30 minutes spent with the patient.  Over 50% were spent in reviewing her lab work in detail, counseling and coordination of care.    Melissa Walsh

## 2021-06-18 NOTE — PATIENT INSTRUCTIONS - HE
Patient Instructions by Melissa Walsh MD at 2/17/2020  3:40 PM     Author: Melissa Walsh MD Service: -- Author Type: Physician    Filed: 2/17/2020  4:50 PM Encounter Date: 2/17/2020 Status: Addendum    : Melissa Walsh MD (Physician)    Related Notes: Original Note by Melissa Walsh MD (Physician) filed at 2/17/2020  3:57 PM         Patient Education   Urinary Incontinence, Female (Adult)  Urinary incontinence means loss of control of the bladder. This problem affects many women, especially as they get older. If you have incontinence, you may be embarrassed to ask for help. But know that this problem can be treated.  Types of Incontinence  There are different types of incontinence. Two of the main types are described here. You can have more than one type.    Stress incontinence. With this type, urine leaks when pressure (stress) is put on the bladder. This may happen when you cough, sneeze, or laugh. Stress incontinence most often occurs because the pelvic floor muscles that support the bladder and urethra are weak. This can happen after pregnancy and vaginal childbirth or a hysterectomy. It can also be due to excess body weight or hormone changes.    Urge incontinence (also called overactive bladder). With this type, a sudden urge to urinate is felt often. This may happen even though there may not be much urine in the bladder. The need to urinate often during the night is common. Urge incontinence most often occurs because of bladder spasms. This may be due to bladder irritation or infection. Damage to bladder nerves or pelvic muscles, constipation, and certain medicines can also lead to urge incontinence.  Treatment of urinary incontinence depends on the cause. Further evaluation is needed to find the type you have. This will likely include an exam and certain tests. Based on the results, you and your healthcare provider can then plan treatment. Until a  diagnosis is made, the home care tips below can help relieve symptoms.  Home care    Do pelvic floor muscle exercises, if they are prescribed. The pelvic floor muscles help support the bladder and urethra. Many women find that their symptoms improve when doing special exercises that strengthen these muscles. To do the exercises contract the muscles you would use to stop your stream of urine, but do this when youre not urinating. Hold for 10 seconds, then relax. Repeat 10 to 20 times in a row, at least 3 times a day. Your provider may give you other instructions for how to do the exercises and how often.    Keep a bladder diary. This helps track how often and how much you urinate over a set period of time. Bring this diary with you to your next visit with the provider. The information can help your provider learn more about your bladder problem.    Lose weight, if advised to by your provider. Excess weight puts pressure on the bladder. Your provider can help you create a weight-loss plan thats right for you. This may include exercising more and making certain diet changes.    Don't consume foods and drinks that may irritate the bladder. These can include alcohol and caffeinated drinks.    Quit smoking. Smoking and other tobacco use can lead to chronic cough that strains the pelvic floor muscles. Smoking may also damage the bladder and urethra. Talk with your provider about treatments or methods you can use to quit smoking.    If drinking large amounts of fluid causes you to have symptoms, you may be advised to limit your fluid intake. You may also be advised to drink most of your fluids during the day and to limit fluids at night.    If youre worried about urine leakage or accidents, you may wear absorbent pads to catch urine. Change the pads often. This helps reduce discomfort. It may also reduce the risk of skin or bladder infections.  Follow-up care  Follow up with your healthcare provider, or as directed. It may  take some to find the right treatment for your problem. Your treatment plan may include special therapies or medicines. Certain procedures or surgery may also be options. Be sure to discuss any questions you have with your provider.  When to seek medical advice  Call the healthcare provider right away if any of these occur:    Fever of 100.4 F (38 C) or higher, or as directed by your provider    Bladder pain or fullness    Abdominal swelling    Nausea or vomiting    Back pain    Weakness, dizziness or fainting  Date Last Reviewed: 10/1/2017    8018-5511 [employer]. [address] [place] [address]. All rights reserved. This information is not intended as a substitute for professional medical care. Always follow your healthcare professional's instructions.     Patient Education   Depression and Suicide in Older Adults  Nearly 2 million older Americans have some type of depression. Sadly, some of them even take their own lives. Yet depression among older adults is often ignored. Learn the warning signs. You may help spare a loved one needless pain. You may also save a life.       What Is Depression?  Depression is a mood disorder that affects the way you think and feel. The most common symptom is a feeling of deep sadness. People who are depressed also may seem tired and listless. And nothing seems to give them pleasure. Its normal to grieve or be sad sometimes. But sadness lessens or passes with time. Depression rarely goes away or improves on its own. Other symptoms of depression are:    Sleeping more or less than normal    Eating more or less than normal    Having headaches, stomachaches, or other pains that dont go away    Feeling nervous, empty, or worthless    Crying a great deal    Thinking or talking about suicide or death    Feeling confused or forgetful  What Causes It?  The causes of depression arent fully known. Certain chemicals in the brain play a role. Depression does run in  families. And life stresses can also trigger depression in some people. That may be the case with older adults. They often face great burdens, such as the death of friends or a spouse. They may have failing health. And they are more likely to be alone, lonely, or poor.  How You Can Help  Often, depressed people may not want to ask for help. When they do, they may be ignored. Or, they may receive the wrong treatment. You can help by showing parents and older friends love and support. If they seem depressed, help them find the right treatment. Talk to your doctor. Or contact a local mental health center, social service agency, or hospital. With modern treatment, no one has to suffer from depression.  Resources:    National [place] of Mental Health  369.889.7558  www.nimh.nih.gov    National [place] on Mental Illness  502.392.3057  www.nicky.org    Mental Health [place]  318.436.9039  www.[facility].org    National Suicide Hotline  314.773.1145 (800-SUICIDE)      4300-5393 [employer]. [address] [place] [address]. All rights reserved. This information is not intended as a substitute for professional medical care. Always follow your healthcare professional's instructions.           Advance Directive  Patients advance directive was discussed and I am comfortable with the patients wishes.  Patient Education   Personalized Prevention Plan  You are due for the preventive services outlined below.  Your care team is available to assist you in scheduling these services.  If you have already completed any of these items, please share that information with your care team to update in your medical record.  Health Maintenance   Topic Date Due   ? HEPATITIS C SCREENING  1950   ? LIPID  03/09/1996   ? ZOSTER VACCINES (1 of 2) 03/09/2001   ? MEDICARE ANNUAL WELLNESS VISIT  03/09/2016   ? PNEUMOCOCCAL IMMUNIZATION 65+ LOW/MEDIUM RISK (1 of 2 - PCV13) 03/09/2016   ? DXA SCAN  03/09/2016   ? INFLUENZA  VACCINE RULE BASED (1) 08/01/2019   ? ADVANCE CARE PLANNING  03/03/2020 (Originally 3/9/1968)   ? FALL RISK ASSESSMENT  10/02/2020   ? MAMMOGRAM  02/28/2021   ? COLONOSCOPY  04/09/2025   ? TD 18+ HE  02/03/2030   ? DEPRESSION ACTION PLAN  Completed      [facility] [facility] - [facility]

## 2021-06-18 NOTE — LETTER
Letter by Melissa Walsh MD at      Author: Melissa Walsh MD Service: -- Author Type: --    Filed:  Encounter Date: 2019 Status: (Other)       2019   Adalgisa Dennis    1950       To whom it may concern:    I am the primary care physician for Adalgisa Dennis.  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  She is suffering from chronic fatigue and other symptoms.  I recommend she continue to see and receive treatment from the functional medicine practitioners at the [facility] including Dr. Diamond Carlson and Grace Jones.  She is making improvement with the treatment plan.      Sincerely,      Melissa Walsh MD

## 2021-06-18 NOTE — PROGRESS NOTES
"SUBJECTIVE: Adalgisa Dennis is a 68 y.o. female with:  Chief Complaint   Patient presents with     Fatigue     f/u   She has had a reoccurrence of fatigue.   She feels like the fatigue has come back in the last few days.  The fatigue got better in January when she went to [place] for a couple of weeks.  She has been doing some more work in April and feels she was overdoing things. She always feels tired.  Denies depression.  Mood has been good.  No recent illness.  She feels tired when she travels.  She was initially seen for this in September and lab work showed elevated reverse T3 and low platelets but no other issues.  She has a lot of social engagements at night in the last couple of weeks.  She had lot of sugar/ alcohol one night and felt poorly afterwards.  She does have a past history of yeast overgrowth and was treated many years ago.    She was diagnosed with osteopenia. She has been taking Pro [place] supplement by [employer] starting 1 month ago.    She continues on levothyroxine 112 mcg 5 days/ week and 125 mcg 2 days / week for her hypothyroidism.  Her TSH was normal in March when checked at [facility].    She takes propranolol occasionally.  She takes lorazepam when she flies.  Denies any other meds.     Her atrial fib has been well controlled. She used acupuncture/ guided imagery to treat.  She was seeing holistic cardiologist at [facility] but she is not there any more.    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  She works with a [employer] .  She is doing some writing.    OBJECTIVE: BP 94/60 (Patient Site: Left Arm, Patient Position: Sitting, Cuff Size: Adult Regular)  Pulse 68  Resp 16  Ht 5' 5\" (1.651 m)  LMP 01/28/2006 no distress  Psych Exam:  Mood: slightly down  Affect: flat   Behavior: appropriate  ThoughtContent: logical  Judgement: appropriate  Insight: normal    PHQ9: 4    Adalgisa was seen today for fatigue.    Diagnoses and all orders for this " visit:    Fatigue- Etiology unclear.  Will do further blood work.  We also discussed a functional medicine approach.  Will start with Meg GI Stool Effects test and Hormone salivary panel to check cortisol/ DHEA.  We talked about supporting her with mitochondrial supplements.  Refer to [facility] to meet with functional nutritionist- she may benefit from the [employer] plan.  -     HM1(CBC and Differential)  -     Triiodothyronine (T3), Reverse  -     GGT (Gamma GT)  -     Heavy Metals Screen, Blood  -     HM1 (CBC with Diff)    Acquired hypothyroidism- Last TSH stable.  Refill current dose thyroid replacement.  -     levothyroxine (SYNTHROID, LEVOTHROID) 112 MCG tablet; Take 1 tablet 5 days a week    Ostoepenia- Handout on supplements for low bone mass.  Discussed pros/ cons of strontium as a supplement.         Melissa Walsh

## 2021-06-19 NOTE — PROGRESS NOTES
SUBJECTIVE: Adalgisa Dennis is a 68 y.o. female with:  Chief Complaint   Patient presents with     Follow-up     pt states increase right shoulder and arm pain, due to recent lymes      Medication Refill     ativan refill     1) She has been doing PT for her right shoulder pain which has been a chronic issue for her.  Started 1 1/2 years ago.  She saw orthopedic practitioner who felt the problem was in her shoulder joint but no better with PT.  Saw Dr. Fish ostepath, who did some work on her shoulder and feels issue is more myofascial pain.  She did have MRI of her neck 3/18 that showed no significant findings.    2) She developed right arm/ hand June 30.  Pain moves around - right upper back/ elbow / finger.  Pain is sharp / achy.  Middle fingers are tingling.  No weakness.  [place] was [place] a [place] of [place], [place] 18[place]22.  Took off a deer tick on trunk. She noticed a red bullseye rash on July 2.  She had been sore on the left flank.  She had fever / fatigue/ malaise.  Saw provider at [facility] on July 2 and got doxycycline 100 mg for 2 weeks.  She also took naproxen.  She felt better by July 6.  She went to see a new homeopath July 11 and got a whole new homeopathic remedy treatment.  Started to have new pain in right shoulder with numbness / tingling going into hand a few days later on July 13.  Pain has continued.  She saw PT and symptoms are worse.  The rash has faded.    SH: Recently .  Retired.    OBJECTIVE: /67 (Patient Site: Right Arm, Patient Position: Sitting, Cuff Size: Adult Large)  Pulse 71  Resp 16  Wt 160 lb 8 oz (72.8 kg)  LMP 01/28/2006  SpO2 98%  Breastfeeding? No  BMI 26.71 kg/m2 She is anxious.  Neck: No c-spine or para spinal tenderness.  Good range of motion.  Back: Tender over right upper trapezius / right rhomboid muscles.  Right upper extremity: No swelling / erythema or deformities.  Tender over right lateral elbow.  Sensation intact on fingertips.  Negative  Tinel's and Phalen's testing.  upper extremities: DTRs symmetric.  Motor 5/5 equal bilaterally.  Skin: No rash.    She shows me photo of rash which is classic bullseye rash of erythema migrans.    Adalgisa was seen today for follow-up and medication refill.    Diagnoses and all orders for this visit:    Lyme disease- She fits clinical picture with history of exposure.  The right arm symptoms are somewhat unusual for primary Lyme's disease and I recommend she keep appointment with Dr. Anders's group.  I am going to treat her for another 2 weeks with rafael as she only had a 2 week course.  -     doxycycline (VIBRA-TABS) 100 MG tablet; Take 1 tablet (100 mg total) by mouth 2 (two) times a day for 14 days.    Pain of right upper arm- Etiology unclear.  Sounds like neurogenic pain so will start gabapentin.  She requests narcotic- has used oxycodone in past with no problems and no history of chem dep.  She will take sparingly.  I recommend she send me her MRI of the c-spine to review.  She could have a response to the homeopathic meds.  If not improving, she may need EMG.  -     oxyCODONE-acetaminophen (PERCOCET) 5-325 mg per tablet; Take 1 tablet by mouth every 4 (four) hours as needed for pain.  -     gabapentin (NEURONTIN) 300 MG capsule; Take 1 po at bedtime and gradually increase to 1 po three times a day as tolerated over 10 days    Update me in a few days.     Melissa Walsh

## 2021-06-20 NOTE — LETTER
Letter by Dileep Hay MD at      Author: Dileep Hay MD Service: -- Author Type: --    Filed:  Encounter Date: 2/3/2020 Status: (Other)                    My Depression Action Plan  Name: Adalgisa Dennis   Date of Birth 1950  Date: 2/3/2020    My Doctor: Melissa Walsh MD   My Clinic: [facility]  [address] [facility] [address]  [phone]          GREEN    ZONE   Good Control    What it looks like:     Things are going generally well. You have normal ups and downs. You may even feel depressed from time to time, but bad moods usually last less than a day.   What you need to do:  1. Continue to care for yourself (see self care plan)  2. Check your depression survival kit and update it as needed  3. Follow your physicians recommendations including any medication.  4. Do not stop taking medication unless you consult with your physician first.           YELLOW         ZONE Getting Worse    What it looks like:     Depression is starting to interfere with your life.     It may be hard to get out of bed; you may be starting to isolate yourself from others.    Symptoms of depression are starting to last most all day and this has happened for several days.     You may have suicidal thoughts but they are not constant.   What you need to do:     1. Call your care team. Your response to treatment will improve if you keep your care team informed of your progress. Yellow periods are signs an adjustment may need to be made.     2. Continue your self-care.  Just get dressed and ready for the day.  Don't give yourself time to talk yourself out of it.    3. Talk to someone in your support network.    4. Open up your depression Depression Self-Care Plan / Wellness kit.           RED    ZONE Medical Alert - Get Help    What it looks like:     Depression is seriously interfering with your life.     You may experience these or other symptoms: You cant get out  of bed most days, cant work or engage in other necessary activities, you have trouble taking care of basic hygiene, or basic responsibilities, thoughts of suicide or death that will not go away, self-injurious behavior.     What you need to do:  1. Call your care team and request a same-day appointment. If they are not available (weekends or after hours) call your local crisis line, emergency room or 911.            Self-Care Plan / Wellness Kit    Self-Care for Depression  Heres the deal. Your body and mind are really not as separate as most people think.  What you do and think affects how you feel and how you feel influences what you do and think. This means if you do things that people who feel good do, it will help you feel better.  Sometimes this is all it takes.  There is also a place for medication and therapy depending on how severe your depression is, so be sure to consult with your medical provider and/ or Behavioral Health Consultant if your symptoms are worsening or not improving.     In order to better manage my stress, I will:    Exercise  Get some form of exercise, every day. This will help reduce pain and release endorphins, the feel good chemicals in your brain. This is almost as good as taking antidepressants!  This is not the same as joining a gym and then never going! (they count on that by the way?) It can be as simple as just going for a walk or doing some gardening, anything that will get you moving.      Hygiene   Maintain good hygiene (get out of bed in the morning, make your bed, brush your teeth, take a shower, and get dressed like you were going to work, even if you are unemployed).  If your clothes don't fit try to get ones that do.    Diet  Strive to eat foods that are good for me, drink plenty of water, and avoid excessive sugar, caffeine, alcohol, and other mood-altering substances.  Some foods that are helpful in depression are: complex carbohydrates, B vitamins, flaxseed, fish or  fish oil, fresh fruits and vegetables.    Psychotherapy  Agree to participate in Individual Therapy (if recommended).    Medication  If prescribed medications, I agree to take them.  Missing doses can result in serious side effects.  I understand that drinking alcohol, or other illicit drug use, may cause potential side effects.  I will not stop my medication abruptly without first discussing it with my provider.    Staying Connected With Others  Stay in touch with my friends, family members, and my primary care provider/team.    Use your imagination  Be creative.  We all have a creative side; it doesnt matter if its oil painting, sand castles, or mud pies! This will also kick up the endorphins.    Witness Beauty  (AKA stop and smell the roses) Take a look outside, even in mid-winter. Notice colors, textures. Watch the squirrels and birds.     Service to others  Be of service to others.  There is always someone else in need.  By helping others we can get out of ourselves and remember the really important things.  This also provides opportunities for practicing all the other parts of the program.    Humor  Laugh and be silly!  Adjust your TV habits for less news and crime-drama and more comedy.    Control your stress  Try breathing deep, massage therapy, biofeedback, and meditation. Find time to relax each day.     Crisis Text Line  http://www.crisistextline.org    The Crisis Text Line serves anyone, in any type of crisis, providing access to free, 24/7 support and information via the medium people already use and trust:    Here's how it works:  1.  Text 636-279 from anywhere in the [place], anytime, about any type of crisis.  2.  A live, trained Crisis Counselor receives the text and responds quickly.  3.  The volunteer Crisis Counselor will help you move from a 'hot moment to a cool moment'.  My support system    Clinic Contact:  Phone number:    Contact 1:  Phone number:    Contact 2:  Phone number:     Christianity/:  Phone number:    Therapist:  Phone number:    [facility] crisis center:    Phone number:    Other community support:  Phone number:

## 2021-06-21 NOTE — LETTER
Letter by Melissa Walsh MD at      Author: Melissa Walsh MD Service: -- Author Type: --    Filed:  Encounter Date: 4/12/2021 Status: (Other)       Non-Opioid Controlled Substance Agreement    This is an agreement between you and your provider regarding safe and appropriate controlled substance prescribing.? Controlled substances are?medicines that can cause physical and mental dependence. The manufacturing, possession and use of these medicines are regulated by law.  We here at [facility] are making a commitment to work with you in your efforts to get better.? To support you in this work, we will help you schedule regular appointments for medicine refills. If we must cancel or change your appointment for any reason, we will make sure you have enough medication to last until your next appointment.      As a Provider, I will:     Listen carefully to your concerns while treating you with dignity.     Recommend a treatment plan that I believe is in your best interest and may involve therapies other than medication.      Review the chance of benefit and the chance of harm of this medicine with you regularly and evaluate the safety and effectiveness of this therapy.       Provide a plan on how to discontinue if the decision is made to stop this medicine.       As a Patient, I understand controlled substances:       Are prescribed by my care provider to help me function or work and manage my condition(s).?    Are strong medicines and can cause serious side effects such as:     Drowsiness, which can seriously affect my driving ability    A lower breathing rate, enough to cause death    Harm to my thinking ability     Depression     Abuse of and addiction to this medicine.      Need to be taken exactly as prescribed.?Combining controlled substances with certain medicines or chemicals (such as illegal drugs, opioids, sedatives, sleeping pills, and benzodiazepines) can be dangerous or even  fatal.? If I stop taking my medicines suddenly, I may have severe withdrawal symptoms.     The risks, benefits, and side effects of these medicine(s) were explained to me. I agree that:    1. I will take part in other treatments as advised by my care team. This may be psychiatry or counseling, physical therapy, behavioral therapy, group treatment or a referral to specialist.    2. I will keep all my appointments and understand this is part of the monitoring of controlled substance.?My care team may require an office visit for EVERY controlled substance refill. If I miss appointments or dont follow instructions, my care team may stop my medicine    3. I will take my medicines as prescribed. I will not change the dose or schedule unless my care team tells me to. There will be no refills if I run out early.      4. I may be contactedwithout warning and asked to complete a urine drug test or pill count at any time. If I dont give a urine sample or participate in a pill count, the care team may stop my medicine.    5. I will only receive controlled substance prescriptions from this clinic. If treated by another provider, I will let them know I am taking controlled substances and that I have a treatment agreement with this provider. I will inform this care team within one business day if I am given a prescription for any controlled substance by another healthcare provider. This care team may contact other providers and pharmacists about my use of the medicines.     6. It is up to me to make sure that I do not run out of my medicines on weekends or holidays.?If my care team is willing to refill my prescription without a visit, I must request refills only during office hours. Refills may take up to 3 business days to process.  I will use one pharmacy to fill all my controlled substance prescriptions.  I will notify the clinic if any changes are made due to insurance changes or medication availability.    7. I am responsible  for my prescriptions. If the medicine/prescription is lost, stolen or destroyed, it will not be replaced.?I also agree not to share controlled substance medicines with anyone.     8. I am aware I should not use any illegal or recreational drugs. I agree not to drink alcohol unless my care team says I can.     9. If I enroll in the [place] Medical Cannabis program, I will tell my care team.?    10. I will tell my care team right away if I become pregnant, have a new medical problem treated outside of my regular clinic, or have a change in my medications.     11. I understand that this medicine can affect my thinking, judgment and reaction time.? Alcohol and drugs affect the brain and body, which can affect the safety of a persons driving. Being under the influence of alcohol or drugs can affect a persons decision-making, behaviors, personal safety, and the safety of others. Driving while impaired (DWI) can occur if a person is driving, operating, or in physical control of a car, motorcycle, boat, snowmobile, ATV, motorbike, off-road vehicle, or any other motor vehicle ([place] Statute 169A.20). I understand the risk if I choose to drive or operate machinery  I understand that if I do not follow any of the conditions above, my prescriptions or treatment may be stopped or changed.     I agree that my provider, clinic care team, and pharmacy may work with any city, state or federal law enforcement agency that investigates the misuse, sale, or other diversion of my controlled medicine. I will allow my provider to discuss my care with or share a copy of this agreement with any other treating provider, pharmacy or emergency room where I receive care.?    I have read this agreement and have asked questions about anything I did not understand.    ________________________________________________________  Patient Signature - Adalgisa Dennis     ___________________                   Date      ________________________________________________________  Provider Signature - Melissa Elier Nico, MD       ___________________                   Date     ________________________________________________________  Witness Signature (required if provider not present while patient signing)          ___________________                   Date

## 2021-06-21 NOTE — PROGRESS NOTES
"SUBJECTIVE: Adalgisa Dennis is a 68 y.o. female with:  Chief Complaint   Patient presents with     Vaginitis    She is being treated at [facility] for a persistent Lyme disease infection. She has been on antibiotics since July.  Has been coming down on antibiotics but still taking cefdinir.  She is having vaginal tightness / pain.  Inside vagina is very irritated.  No discharge or rash.  No skin problems. She has long history of dyspareunia.  Is getting some therapy for this but still very uncomfortable. Overall she is doing better with less fatigue.    OBJECTIVE: /60  Pulse 64  Ht 5' 5\" (1.651 m)  Wt 158 lb (71.7 kg)  LMP 01/28/2006  SpO2 96%  BMI 26.29 kg/m2 no distress.  : Normal female genitalia.  Vagina is erythema and very tender to even light touch of q-tip.  Minimal discharge.  Anus: She has faint erythematous macular rash surrounding.    Recent Results (from the past 240 hour(s))   Comprehensive Metabolic Panel   Result Value Ref Range    Sodium 139 136 - 145 mmol/L    Potassium 4.0 3.5 - 5.0 mmol/L    Chloride 103 98 - 107 mmol/L    CO2 28 22 - 31 mmol/L    Anion Gap, Calculation 8 5 - 18 mmol/L    Glucose 95 70 - 125 mg/dL    BUN 16 8 - 22 mg/dL    Creatinine 0.92 0.60 - 1.10 mg/dL    GFR MDRD Af Amer >60 >60 mL/min/1.73m2    GFR MDRD Non Af Amer >60 >60 mL/min/1.73m2    Bilirubin, Total 0.6 0.0 - 1.0 mg/dL    Calcium 9.7 8.5 - 10.5 mg/dL    Protein, Total 6.5 6.0 - 8.0 g/dL    Albumin 4.3 3.5 - 5.0 g/dL    Alkaline Phosphatase 72 45 - 120 U/L    AST 20 0 - 40 U/L    ALT 18 0 - 45 U/L   HM1 (CBC with Diff)   Result Value Ref Range    WBC 8.3 4.0 - 11.0 thou/uL    RBC 4.24 3.80 - 5.40 mill/uL    Hemoglobin 13.1 12.0 - 16.0 g/dL    Hematocrit 40.9 35.0 - 47.0 %    MCV 97 80 - 100 fL    MCH 30.9 27.0 - 34.0 pg    MCHC 32.0 32.0 - 36.0 g/dL    RDW 13.2 11.0 - 14.5 %    Platelets 119 (L) 140 - 440 thou/uL    MPV  8.5 - 12.5 fL    Neutrophils % 38 (L) 50 - 70 %    Lymphocytes % 51 (H) 20 " - 40 %    Monocytes % 8 2 - 10 %    Eosinophils % 3 0 - 6 %    Basophils % 1 0 - 2 %    Neutrophils Absolute 3.1 2.0 - 7.7 thou/uL    Lymphocytes Absolute 4.3 0.8 - 4.4 thou/uL    Monocytes Absolute 0.7 0.0 - 0.9 thou/uL    Eosinophils Absolute 0.2 0.0 - 0.4 thou/uL    Basophils Absolute 0.1 0.0 - 0.2 thou/uL   Wet Prep, Vaginal   Result Value Ref Range    Yeast Result Yeast Seen (!) No yeast seen    Trichomonas No Trichomonas seen No Trichomonas seen    Clue Cells, Wet Prep No Clue cells seen No Clue cells seen      Adalgisa was seen today for vaginitis.    Diagnoses and all orders for this visit:    Vaginal irritation  -     Wet Prep, Vaginal  -     Ambulatory referral to Obstetrics / Gynecology  -     fluconazole (DIFLUCAN) 150 MG tablet; Take 1 dose po x 1 now and may repeat in 4 days if not better.     Will treat for yeast vaginitis with involvement of anal area as well.  Refer to Dr. Grissom for help with dyspareunia.    Melissa Walsh

## 2021-06-21 NOTE — LETTER
Letter by Melissa Walsh MD at      Author: Melissa Walsh MD Service: -- Author Type: --    Filed:  Encounter Date: 10/28/2020 Status: (Other)       11/3/2020   Adalgisa Dennis   1950   [address] [place] [address]      Compounded levothyroxine 137 mcg PO daily  #1 months with 11 refills.        Melissa Walsh MD

## 2021-06-21 NOTE — PROGRESS NOTES
Preoperative Exam    Scheduled Procedure: Cataract surgery, Initial surgery on left eye then 2 weeks after will be right eye  Surgery Date:  11/26/18, 12/10/18  Surgery Location: [facility] [place] location office Fax: [phone]    Surgeon:  Dr. Scott    Assessment/Plan:     Adalgisa was seen today for pre-op exam.    Preop general physical exam    Cataract cortical, senile, bilateral    Other orders  -     LORazepam (ATIVAN) 0.5 MG tablet; Take 1 po daily as needed for anxiety.        Surgical Procedure Risk: Low (reported cardiac risk generally < 1%)  Have you had prior anesthesia?: Yes  Have you or any family members had a previous anesthesia reaction:  No  Do you or any family members have a history of a clotting or bleeding disorder?: No  Cardiac Risk Assessment: no increased risk for major cardiac complications    Patient approved for surgery with general or local anesthesia.    Please Note:  No concerns    Functional Status: Independent  Patient plans to recover at home alone. Family member will be supportive.     Subjective:      Adalgisa Dennis is a 68 y.o. female who presents for a preoperative consultation.  She has bilateral cataracts that have been worsening.    All other systems reviewed and are negative, other than those listed in the HPI.    She has history of paroxysmal a fib.  Takes daily asa.  Has propranolol to use PRN.    She is being treated for chronic lyme disease by [facility] providers.  She is taking pulsed courses of antibiotics and using supplements to treat this.    Pertinent History  Do you have difficulty breathing or chest pain after walking up a flight of stairs: No  History of obstructive sleep apnea: No  Steroid use in the last 6 months: No  Frequent Aspirin/NSAID use: Yes: Take 81 mg daily  Prior Blood Transfusion: No  Prior Blood Transfusion Reaction: No  If for some reason prior to, during or after the procedure, if it is medically indicated, would you  "be willing to have a blood transfusion?:  There is no transfusion refusal.    Current Outpatient Medications   Medication Sig Dispense Refill     ascorbic acid, vitamin C, (VITAMIN C) 1000 MG tablet Take 1,000 mg by mouth.       aspirin-calcium carbonate 81 mg-300 mg calcium(777 mg) Tab Take by mouth.       cefdinir (OMNICEF) 300 MG capsule Take 300 mg by mouth 2 (two) times a day.       cholecalciferol, vitamin D3, (VITAMIN D3) 5,000 unit Tab Take 10,000 Units by mouth daily.       levothyroxine (SYNTHROID, LEVOTHROID) 112 MCG tablet Take 1 tablet 5 days a week 90 tablet 3     levothyroxine (SYNTHROID, LEVOTHROID) 125 MCG tablet TAKE 1 TABLET BY MOUTH 2 DAYS A WEEK       LORazepam (ATIVAN) 0.5 MG tablet Take 1 po daily as needed for anxiety 30 tablet 0     OMEGA-3/DHA/EPA/FISH OIL (FISH OIL-OMEGA-3 FATTY ACIDS) 300-1,000 mg capsule Take 1 g by mouth daily.       oxyCODONE-acetaminophen (PERCOCET) 5-325 mg per tablet Take 1 tablet by mouth every 4 (four) hours as needed for pain. 12 tablet 0     propranolol (INDERAL) 20 MG tablet Take 1 tablet (20 mg total) by mouth daily. 30 tablet 2     UNABLE TO FIND Take by mouth. HOMEOPATHIC PRODUCTS PO       No current facility-administered medications for this visit.         Allergies   Allergen Reactions     Other Food Allergy Anaphylaxis     Dairy Other (See Comments)     Gluten Other (See Comments)     Causes sinus headache     Lactose Other (See Comments)     \"causes sinus headache\"     Other Environmental Allergy Other (See Comments)     crab and lobster     Shellfish Containing Products Other (See Comments)     Breathing problem, rash     Sulfa (Sulfonamide Antibiotics) Other (See Comments)       Patient Active Problem List   Diagnosis     Acquired hypothyroidism     Atrial fibrillation (H)     Cyst of thyroid     DDD (degenerative disc disease), lumbosacral     Dysthymia     Digestive disorder     Chronic fatigue       Past Medical History:   Diagnosis Date     Lyme " "disease        Past Surgical History:   Procedure Laterality Date     APPENDECTOMY  1965     THYROIDECTOMY  2006       Social History     Socioeconomic History     Marital status: Single     Spouse name: Not on file     Number of children: Not on file     Years of education: Not on file     Highest education level: Not on file   Social Needs     Financial resource strain: Not on file     Food insecurity - worry: Not on file     Food insecurity - inability: Not on file     Transportation needs - medical: Not on file     Transportation needs - non-medical: Not on file   Occupational History     Not on file   Tobacco Use     Smoking status: Former Smoker     Types: Cigarettes     Smokeless tobacco: Never Used     Tobacco comment: in 20's and 30's   Substance and Sexual Activity     Alcohol use: No     Drug use: No     Sexual activity: Not on file   Other Topics Concern     Not on file   Social History Narrative     Not on file       Patient Care Team:  Melissa Walsh MD as PCP - General (Family Medicine)          Objective:     Vitals:    11/16/18 1412   BP: (!) 84/56   Pulse: (!) 54   Resp: 16   Temp: 97.7  F (36.5  C)   TempSrc: Oral   SpO2: 98%   Weight: 159 lb (72.1 kg)   Height: 5' 4.5\" (1.638 m)   LMP: 01/28/2006         Physical Exam:  BP (!) 89/58 (Patient Site: Right Arm, Patient Position: Sitting)   Pulse (!) 54   Temp 97.7  F (36.5  C) (Oral)   Resp 16   Ht 5' 4.5\" (1.638 m)   Wt 159 lb (72.1 kg)   LMP 01/28/2006   SpO2 98%   BMI 26.87 kg/m     No acute distress  HEENT: Head atraumatic / normocephalic.  PERRL.  Conjunctiva clear. Nose with no discharge.  Tympanic membranes grey with normal landmarks.  OP - pink and moist.  Normal dentition.  Neck: Supple.  No lymphadenopathy or thyromegaly.  Lungs: CTA.  No retractions or tachypnea.  CV: RRR. S1 and S2 normal.  No murmurs / rubs / gallops.  No lower extremity edema.    Abdomen: Soft. Non tender. Non distended.  No HSM or masses.  Skin: No " rashes or lesions.  Neuro: AAOx3.  Normal strength and tone.  Normal gait.  DTRs in lower extremities equal bilaterally.  Psych: Mood and affect normal.  Good eye contact.  Normal speech.    BP Readings from Last 3 Encounters:   11/16/18 (!) 89/58   11/15/18 102/60   10/31/18 110/60         There are no Patient Instructions on file for this visit.        Labs:  Lab Results   Component Value Date    WBC 8.3 10/26/2018    HGB 13.1 10/26/2018    HCT 40.9 10/26/2018    MCV 97 10/26/2018     (L) 10/26/2018     Results for orders placed or performed in visit on 10/26/18   Comprehensive Metabolic Panel   Result Value Ref Range    Sodium 139 136 - 145 mmol/L    Potassium 4.0 3.5 - 5.0 mmol/L    Chloride 103 98 - 107 mmol/L    CO2 28 22 - 31 mmol/L    Anion Gap, Calculation 8 5 - 18 mmol/L    Glucose 95 70 - 125 mg/dL    BUN 16 8 - 22 mg/dL    Creatinine 0.92 0.60 - 1.10 mg/dL    GFR MDRD Af Amer >60 >60 mL/min/1.73m2    GFR MDRD Non Af Amer >60 >60 mL/min/1.73m2    Bilirubin, Total 0.6 0.0 - 1.0 mg/dL    Calcium 9.7 8.5 - 10.5 mg/dL    Protein, Total 6.5 6.0 - 8.0 g/dL    Albumin 4.3 3.5 - 5.0 g/dL    Alkaline Phosphatase 72 45 - 120 U/L    AST 20 0 - 40 U/L    ALT 18 0 - 45 U/L           There is no immunization history on file for this patient.        Electronically signed by Melissa Walsh MD 11/16/18 2:15 PM

## 2021-06-21 NOTE — PROGRESS NOTES
CC: The patient is being seen as a consult from Dr Walsh secondary to vaginal irritation.    HPI: The pt is a 68 y.o. SWF P1 who presents with irritation for some time.  She isn't sure how long it's been there.  She recently saw a physical therapist for dyspareunia and had a lot of pain even with a cotton swab in the vaginal area.  She saw Dr Walsh; wet prep showed yeast so she was prescribed Diflucan.  She is being treated for Lyme's and has been on antibiotics for 4 months.  Her Lyme's doctor prescribed Nystatin for 4 weeks instead of the Diflucan.  She feels like things might be a little better.  She is tapering the antibiotics.  She does take probiotics.  She has tried an OTC lubricant that didn't help.  She became menopausal about 12 years ago.    Past Medical History:   Diagnosis Date     Lyme disease        Past Surgical History:   Procedure Laterality Date     APPENDECTOMY  1965     THYROIDECTOMY  2006       Patient's   Family History   Problem Relation Age of Onset     Breast cancer Sister      Bipolar disorder Sister      Stroke Father 87     No Medical Problems Maternal Grandmother      No Medical Problems Maternal Grandfather      No Medical Problems Paternal Grandmother      Cancer Paternal Grandfather      Hyperlipidemia Sister      No Medical Problems Half Brother      Diabetes Other        Patient   Social History     Socioeconomic History     Marital status: Single     Spouse name: None     Number of children: None     Years of education: None     Highest education level: None   Social Needs     Financial resource strain: None     Food insecurity - worry: None     Food insecurity - inability: None     Transportation needs - medical: None     Transportation needs - non-medical: None   Occupational History     None   Tobacco Use     Smoking status: Former Smoker     Types: Cigarettes     Smokeless tobacco: Never Used     Tobacco comment: in 20's and 30's   Substance and Sexual Activity      "Alcohol use: No     Drug use: No     Sexual activity: None   Other Topics Concern     None   Social History Narrative     None       Outpatient Medications Prior to Visit   Medication Sig Dispense Refill     ascorbic acid, vitamin C, (VITAMIN C) 1000 MG tablet Take 1,000 mg by mouth.       aspirin-calcium carbonate 81 mg-300 mg calcium(777 mg) Tab Take by mouth.       cefdinir (OMNICEF) 300 MG capsule Take 300 mg by mouth 2 (two) times a day.       cholecalciferol, vitamin D3, (VITAMIN D3) 5,000 unit Tab Take 10,000 Units by mouth daily.       levothyroxine (SYNTHROID, LEVOTHROID) 112 MCG tablet Take 1 tablet 5 days a week 90 tablet 3     levothyroxine (SYNTHROID, LEVOTHROID) 125 MCG tablet TAKE 1 TABLET BY MOUTH 2 DAYS A WEEK       LORazepam (ATIVAN) 0.5 MG tablet Take 1 po daily as needed for anxiety 30 tablet 0     OMEGA-3/DHA/EPA/FISH OIL (FISH OIL-OMEGA-3 FATTY ACIDS) 300-1,000 mg capsule Take 1 g by mouth daily.       oxyCODONE-acetaminophen (PERCOCET) 5-325 mg per tablet Take 1 tablet by mouth every 4 (four) hours as needed for pain. 12 tablet 0     propranolol (INDERAL) 20 MG tablet Take 1 tablet (20 mg total) by mouth daily. 30 tablet 2     UNABLE TO FIND Take by mouth. HOMEOPATHIC PRODUCTS PO       fluconazole (DIFLUCAN) 150 MG tablet Take 1 dose po x 1 now and may repeat in 4 days if not better. 2 tablet 0     gabapentin (NEURONTIN) 300 MG capsule Take 1 po at bedtime and gradually increase to 1 po three times a day as tolerated over 10 days 90 capsule 0     No facility-administered medications prior to visit.        Patient is allergic to other food allergy; dairy; gluten; lactose; other environmental allergy; shellfish containing products; and sulfa (sulfonamide antibiotics).    ROS:  12 part ROS is negative aside from those symptoms in the HPI    PE:  /60   Pulse 60   Ht 5' 5\" (1.651 m)   Wt 156 lb (70.8 kg)   LMP 01/28/2006           Body mass index is 25.96 kg/m .    General: overweight WF, " NAD  Pelvic: EG/BUS no lesions, no skin change, atrophic   Vagina no lesions, no discharge, atrophic, speculum exam very uncomfortable   Perineum no lesions   Rectal deferred  Psych: normal mood  Neuro: CN I-XII grossly intact  MS: normal gait    Assessment: 68 y.o. SWF P1 with vaginal and vulvar atrophy.    Plan: Natural history of atrophic changes discussed with the patient.  We discussed that this could also be aggravated by the Lyme's treatment and yeast infections.  We discussed using a vaginal moisturizer like coconut oil or a topical estrogen.  At this time she wants to start with the coconut oil to see how it works for her.  I recommended that she use it every day at bedtime.  We discussed that it will take 2-4 weeks to see an improvement.  We discussed that when she finishes the antibiotics she may also see an improvement.  If things aren't better, she is to let me know if she wants to try a topical estrogen.  We discussed starting with Estrace and then if she has an improvement in her symptoms, she can change to a ring or Vagifem if she wants.  Questions were answered.    Approximately 30 minutes were spent with the patient with the majority in counseling.

## 2021-06-21 NOTE — PROGRESS NOTES
Assessment/Plan:         1. Bruising, spontaneous     2. Right shoulder pain  HM1 (CBC and Differential)    Comprehensive Metabolic Panel    Copper, Serum    Zinc, Serum    HM1 (CBC with Diff)   3. Nutritional disorder  HM1 (CBC and Differential)    Comprehensive Metabolic Panel    Copper, Serum    Zinc, Serum    HM1 (CBC with Diff)   4. Fatigue  HM1 (CBC and Differential)    Comprehensive Metabolic Panel    Copper, Serum    Zinc, Serum    HM1 (CBC with Diff)   5. Lyme disease  HM1 (CBC and Differential)    Comprehensive Metabolic Panel    Copper, Serum    Zinc, Serum    HM1 (CBC with Diff)   6. Avitaminosis D  HM1 (CBC and Differential)    Comprehensive Metabolic Panel    Copper, Serum    Zinc, Serum    HM1 (CBC with Diff)   7. Myalgia  HM1 (CBC and Differential)    Comprehensive Metabolic Panel    Copper, Serum    Zinc, Serum    HM1 (CBC with Diff)   8. Arm pain, right  HM1 (CBC and Differential)    Comprehensive Metabolic Panel    Copper, Serum    Zinc, Serum    HM1 (CBC with Diff)   9. Lyme neuropathy  HM1 (CBC and Differential)    Comprehensive Metabolic Panel    Copper, Serum    Zinc, Serum    HM1 (CBC with Diff)   10. Paroxysmal atrial fibrillation (H)  HM1 (CBC and Differential)    Comprehensive Metabolic Panel    Copper, Serum    Zinc, Serum    HM1 (CBC with Diff)   11. Abnormal serum enzyme level   Copper, Serum   12. Toxic effect of zinc and its compounds, undetermined, sequela   Zinc, Serum     Discussed with patient to continue following with her functional medicine doctor and primary care provider.  She had blood work that was already ordered by Dr. Walsh.  Etiology of her bruising I am concerned is likely from her low platelet count.  She has a history of low platelets.  Will watch the CBC and see what this comes back as.  Ultimately discussed with the patient that I was not sure what was causing her unusual bruising.  It could have a compound effect from her treatment for Lyme as well as her  low platelets.  She otherwise appears well and does not need urgent evaluation.  I did discuss with patient that if she has having worsening symptoms or other signs of bruising or bleeding further evaluation would need to occur.  She is in agreement with this plan.  And will maintain care with her functional medicine doctor and her primary care provider.       I was able to briefly review the blood work.  There were many flags and we will may need to be sent to [facility].  I anticipate that many of the flags are accurate.  Patient was advised to present to the emergency department if she has shortness of breath, difficulty breathing, worsening bruising or progression of her bruising.    Subjective:      Adalgisa Dennis is a 68 y.o. female who presents for concerns of bruising on hands. She is being treated with a variety of supplements for Lymes disease. She does have a history of low plts. Last check was 119. She is getting this checked again today. The brusising is coming and going and will fade within a day.  She denies any new or recent changes to supplements that she is taking for her chronic Lyme disease.  She is on herbal supplements from a functional medicine MD for treatment of chronic Lyme.       The following portions of the patient's history were reviewed and updated as appropriate: allergies, current medications and problem list.    Patient Active Problem List   Diagnosis     Acquired hypothyroidism     Atrial fibrillation (H)     Cyst of thyroid     DDD (degenerative disc disease), lumbosacral     Dysthymia     Digestive disorder     Chronic fatigue     Current Outpatient Medications   Medication Sig Note     ascorbic acid, vitamin C, (VITAMIN C) 1000 MG tablet Take 1,000 mg by mouth. 2017: Received from: [facility] Received Sig: Take 1,000 mg by mouth three times a week.     aspirin-calcium carbonate 81 mg-300 mg calcium(777 mg) Tab Take by mouth. 2017: Received from: [facility] Received Si tab  "po QD (Once per day)     cefdinir (OMNICEF) 300 MG capsule Take 300 mg by mouth 2 (two) times a day.      cholecalciferol, vitamin D3, (VITAMIN D3) 5,000 unit Tab Take 10,000 Units by mouth daily.      levothyroxine (SYNTHROID, LEVOTHROID) 112 MCG tablet Take 1 tablet 5 days a week      levothyroxine (SYNTHROID, LEVOTHROID) 125 MCG tablet TAKE 1 TABLET BY MOUTH 2 DAYS A WEEK 8/28/2017: Received from: [facility]     LORazepam (ATIVAN) 0.5 MG tablet Take 1 po daily as needed for anxiety.      OMEGA-3/DHA/EPA/FISH OIL (FISH OIL-OMEGA-3 FATTY ACIDS) 300-1,000 mg capsule Take 1 g by mouth daily.      oxyCODONE-acetaminophen (PERCOCET) 5-325 mg per tablet Take 1 tablet by mouth every 4 (four) hours as needed for pain.      propranolol (INDERAL) 20 MG tablet Take 1 tablet (20 mg total) by mouth daily.      UNABLE TO FIND Take by mouth. HOMEOPATHIC PRODUCTS PO 8/28/2017: Received from: [facility] Received Sig: Take  by mouth daily.     ketorolac (ACULAR) 0.5 % ophthalmic solution INSTILL 1 DROP IN SURGICAL EYES 4 TIMES A DAY STARTING 1 DAY PRIOR TO SURGERY AND USE UNTIL GONE  DO NOT EXCEED 4 WEEKS      ofloxacin (OCUFLOX) 0.3 % ophthalmic solution INSTILL 1 DROP IN SURGICAL EYES 4 TIMES A DAY STARTING 1 DAY PRIOR TO SURGERY AND USE UNTIL GONE   DO NOT EXCEED 4 WEEKS      prednisoLONE acetate (PRED-FORTE) 1 % ophthalmic suspension INSTILL 1 DROP IN SURGICAL EYES 4 TIMES A DAY STARTING 1 DAY PRIOR TO SURGERY AND USE UNTIL GONE  DO NOT EXCEED 4 WEEKS      Patient also remarks that there are multiple limits that she is on that or not listed.  She does not know the names of the supplements.    Review of Systems   Pertinent items are noted in HPI.      Objective:      BP 98/60   Pulse 72   Resp 16   Ht 5' 5.5\" (1.664 m)   Wt 160 lb (72.6 kg)   LMP 01/28/2006   BMI 26.22 kg/m      General appearance: alert, appears stated age and cooperative  Head: Normocephalic, without obvious abnormality, atraumatic  Lungs: clear to " auscultation bilaterally  Heart: regular rate and rhythm, S1, S2 normal, no murmur, click, rub or gallop  Extremities: extremities normal, atraumatic, no cyanosis or edema and Bruising of hands was evaluated.  No known trauma.  Concern for low platelets with her history.  Bruise on lower extremity appears to be from trauma.  It has not gotten larger and patient feels it is resolving.  Pulses: 2+ and symmetric  Skin: Skin color, texture, turgor normal. No rashes or lesions  Neurologic: Grossly normal

## 2021-06-22 NOTE — PROGRESS NOTES
"SUBJECTIVE: Adalgisa Dennis is a 68 y.o. female with:  Chief Complaint   Patient presents with     Hot Flashes     Thyroids Meds consult    1) She feels hot at times.  Mostly during day. Feels sweaty and flushed at times.  Some hot flashes years ago.  Not worse after eating.  Going on for a few months.  She is taking a number a herbs/ supplements for chronic Lyme's.  She did take a course of antibiotics initially for treatment but that stopped two months ago.  She has been mostly off gluten/ dairy.  She will get some soreness in her right ear with some trouble hearing.  Overall she feels she is getting better slowly.    2) Hypothyroidism- She has been taking levothyroxine.  Has not had her thyroid checked in awhile.  Overall her fatigue is a little better.    3) Got a second opinion about vaginal pain and was diagnosed with lichen sclerosis and atrophy - she will be using steroid cream and topical estrogen.      Supplements:  Vitamin D/ reacted magnesium / Ashwaghanda/  Saccaromyces boulardii / zinc / Biotoxin binder / activated charcoal / Herbal antibiotics / vitamin C.   SH: .  Retired.    OBJECTIVE: /68 (Patient Site: Right Arm, Patient Position: Sitting, Cuff Size: Adult Large)   Pulse 77   Ht 5' 5.5\" (1.664 m)   Wt 159 lb (72.1 kg)   LMP 01/28/2006   SpO2 99%   BMI 26.06 kg/m     No acute distress.  HEENT: Head is atraumatic and/normocephalic.  PERRL.  Conjunctiva clear.  Tympanic membranes grey with normal landmarks and normal light reflexes.  No nasal discharge.  Oropharynx is pink and moist.    Neck: Supple.  No lymphadenopathy or thyromegaly.  Lungs: Clear to auscultation.  No wheezing, retractions, or tachypnea.  Heart: RRR. S1 and S2 normal.  No murmurs, rubs, or gallops.  Neuro: Awake, alert, oriented x 3.  Normal strength and tone.  Normal gait.     Adalgisa was seen today for hot flashes.    Diagnoses and all orders for this visit:    Acquired hypothyroidism- Check TFTs to make " sure not overcorrected.  -     T3 (Triiodothyronine), Free  -     T4, Free  -     Thyroid Stimulating Hormone (TSH)    Visit for screening mammogram  -     Mammo Screening Bilateral; Future    Lyme disease- Improving.  Continue herbal therapies.  Check CBC as had low platelets when last checked.  -     HM1 (CBC and Differential)  -     HM1 (CBC with Diff)     Melissa Walsh

## 2021-06-23 NOTE — TELEPHONE ENCOUNTER
Who is calling:  Patient is calling in due to letter not being seen on her mychart  Reason for Call:  Patient isn't able to access letter on her mychart. Please resend it to her on [email or web address].   Date of last appointment with primary care:  1/7/19  Has the patient been recently seen:  Yes  Okay to leave a detailed message: Yes

## 2021-06-25 NOTE — TELEPHONE ENCOUNTER
I called [facility]  for the report and it is not done yet. They said can take up to 2 weeks.

## 2021-06-25 NOTE — TELEPHONE ENCOUNTER
Preop notes were faxed to [facility] location    Fax: [phone].    [facility]/MICA

## 2021-06-25 NOTE — PROGRESS NOTES
Preoperative Exam    Scheduled Procedure: Cataract surgery right eye  Surgery Date:  04/01/19  Surgery Location: [facility] [place] location office Fax: [phone]      Surgeon:  Dr. Scott    Assessment/Plan:     Adalgisa was seen today for pre-op exam.    Preop general physical exam    Paroxysmal atrial fibrillation (H)- Recommend she make appointment to establish care with cardiology.    Senile cataract of right eye, unspecified age-related cataract type        Surgical Procedure Risk: Low (reported cardiac risk generally < 1%)  Have you had prior anesthesia?: Yes  Have you or any family members had a previous anesthesia reaction:  No  Do you or any family members have a history of a clotting or bleeding disorder?: No  Cardiac Risk Assessment: no increased risk for major cardiac complications    Patient approved for surgery with general or local anesthesia.    Please Note:  No concerns    Functional Status: Independent  Patient plans to recover at home with family.  Will have help recovering.     Subjective:      Adalgisa Dennis is a 69 y.o. female who presents for a preoperative consultation.  She has a worsening right cataract.  Had left cataract repaired this fall.    She has history of paroxysmal a fib.  Takes daily asa.  Has propranolol to use PRN.  [id number] SCORE- 2.     She is being treated for chronic lyme disease by [facility] providers.  She is taking supplements to treat this.  Starting with a new homeopathic protocol.    All other systems reviewed and are negative, other than those listed in the HPI.    Pertinent History  Do you have difficulty breathing or chest pain after walking up a flight of stairs: No  History of obstructive sleep apnea: No  Steroid use in the last 6 months: Yes: topical cream   Frequent Aspirin/NSAID use: Yes: aspirin  Prior Blood Transfusion: No  Prior Blood Transfusion Reaction: No  If for some reason prior to, during or after the procedure, if it is  "medically indicated, would you be willing to have a blood transfusion?:  There is no transfusion refusal.    Current Outpatient Medications   Medication Sig Dispense Refill     ascorbic acid, vitamin C, (VITAMIN C) 1000 MG tablet Take 1,000 mg by mouth.       aspirin-calcium carbonate 81 mg-300 mg calcium(777 mg) Tab Take by mouth.       cholecalciferol, vitamin D3, (VITAMIN D3) 5,000 unit Tab Take 10,000 Units by mouth daily.       ketorolac (ACULAR) 0.5 % ophthalmic solution INSTILL 1 DROP IN SURGICAL EYES 4 TIMES A DAY STARTING 1 DAY PRIOR TO SURGERY AND USE UNTIL GONE  DO NOT EXCEED 4 WEEKS  1     levothyroxine (SYNTHROID, LEVOTHROID) 112 MCG tablet Take 1 tablet 5 days a week 90 tablet 3     levothyroxine (SYNTHROID, LEVOTHROID) 125 MCG tablet TAKE 1 TABLET BY MOUTH 2 DAYS A WEEK 30 tablet 0     LORazepam (ATIVAN) 0.5 MG tablet Take 1 po daily as needed for anxiety. 30 tablet 0     ofloxacin (OCUFLOX) 0.3 % ophthalmic solution INSTILL 1 DROP IN SURGICAL EYES 4 TIMES A DAY STARTING 1 DAY PRIOR TO SURGERY AND USE UNTIL GONE   DO NOT EXCEED 4 WEEKS  1     OMEGA-3/DHA/EPA/FISH OIL (FISH OIL-OMEGA-3 FATTY ACIDS) 300-1,000 mg capsule Take 1 g by mouth daily.       propranolol (INDERAL) 20 MG tablet Take 1 tablet (20 mg total) by mouth daily. 30 tablet 2     UNABLE TO FIND Take by mouth. HOMEOPATHIC PRODUCTS PO       prednisoLONE acetate (PRED-FORTE) 1 % ophthalmic suspension INSTILL 1 DROP IN SURGICAL EYES 4 TIMES A DAY STARTING 1 DAY PRIOR TO SURGERY AND USE UNTIL GONE  DO NOT EXCEED 4 WEEKS  1     No current facility-administered medications for this visit.         Allergies   Allergen Reactions     Other Food Allergy Anaphylaxis     Dairy Other (See Comments)     Gluten Other (See Comments)     Causes sinus headache     Lactose Other (See Comments)     \"causes sinus headache\"     Other Environmental Allergy Other (See Comments)     crab and lobster     Shellfish Containing Products Other (See Comments)     " Breathing problem, rash     Sulfa (Sulfonamide Antibiotics) Other (See Comments)       Patient Active Problem List   Diagnosis     Acquired hypothyroidism     Atrial fibrillation (H)     Cyst of thyroid     DDD (degenerative disc disease), lumbosacral     Dysthymia     Digestive disorder     Chronic fatigue       Past Medical History:   Diagnosis Date     Lyme disease        Past Surgical History:   Procedure Laterality Date     APPENDECTOMY  1965     THYROIDECTOMY  2006       Social History     Socioeconomic History     Marital status: Single     Spouse name: Not on file     Number of children: Not on file     Years of education: Not on file     Highest education level: Not on file   Occupational History     Not on file   Social Needs     Financial resource strain: Not on file     Food insecurity:     Worry: Not on file     Inability: Not on file     Transportation needs:     Medical: Not on file     Non-medical: Not on file   Tobacco Use     Smoking status: Former Smoker     Types: Cigarettes     Smokeless tobacco: Never Used     Tobacco comment: in 20's and 30's   Substance and Sexual Activity     Alcohol use: No     Drug use: No     Sexual activity: Not on file   Lifestyle     Physical activity:     Days per week: Not on file     Minutes per session: Not on file     Stress: Not on file   Relationships     Social connections:     Talks on phone: Not on file     Gets together: Not on file     Attends Sabianism service: Not on file     Active member of club or organization: Not on file     Attends meetings of clubs or organizations: Not on file     Relationship status: Not on file     Intimate partner violence:     Fear of current or ex partner: Not on file     Emotionally abused: Not on file     Physically abused: Not on file     Forced sexual activity: Not on file   Other Topics Concern     Not on file   Social History Narrative     Not on file       Patient Care Team:  Melissa Walsh MD as PCP -  "General (Family Medicine)          Objective:     Vitals:    03/15/19 1421   BP: 108/74   Pulse: (!) 52   Temp: 98  F (36.7  C)   SpO2: 98%   Height: 5' 5.5\" (1.664 m)   LMP: 01/28/2006         Physical Exam:  /74 (Patient Site: Right Arm, Patient Position: Sitting, Cuff Size: Adult Regular)   Pulse (!) 52   Temp 98  F (36.7  C)   Ht 5' 5.5\" (1.664 m)   LMP 01/28/2006   SpO2 98%   BMI 26.06 kg/m    No acute distress  HEENT: Head atraumatic / normocephalic.  PERRL.  Conjunctiva clear. Nose with no discharge.  Tympanic membranes grey with normal landmarks.  OP - pink and moist.  Normal dentition.  Neck: Supple.  No lymphadenopathy or thyromegaly.  Lungs: CTA.  No retractions or tachypnea.  CV: Irregularly irregular HR. S1 and S2 normal.  No murmurs / rubs / gallops.  No lower extremity edema.    Abdomen: Soft. Non tender. Non distended.  No HSM or masses.  Skin: No rashes or lesions.  Neuro: AAOx3.  Normal strength and tone.  Normal gait.  DTRs in lower extremities equal bilaterally.  Psych: Mood and affect normal.  Good eye contact.  Normal speech.     Patient Instructions   Dr. Evi Meeks - [facility]    Cardiology at [facility] - consider Dr. Guanako Cortez / Dr. Heydi Mcdonald          There is no immunization history on file for this patient.        Electronically signed by Melissa Walsh MD 03/15/19 2:24 PM      "

## 2021-06-25 NOTE — PATIENT INSTRUCTIONS - HE
Dr. Evi Meeks - [facility]    Cardiology at [facility] - consider Dr. Guanako Cortez / Dr. Heydi Mcdonald

## 2021-06-28 NOTE — PROGRESS NOTES
"Progress Notes by Melissa Walsh MD at 10/2/2019 11:00 AM     Author: Melissa Walsh MD Service: -- Author Type: Physician    Filed: 10/3/2019  2:54 PM Encounter Date: 10/2/2019 Status: Signed    : Melissa Walsh MD (Physician)       SUBJECTIVE: Adalgisa Dennis is a 69 y.o. female with:  Chief Complaint   Patient presents with   ? Hair/Scalp Problem     itching scalp x few months   ? Insomnia    1) Itchy scalp- Going on in the last few weeks.  No rash/ flaking skin.  She has been using more conventional shampoos.  No treatment yet.    2) Insomnia - Long standing problem.  She does have some snoring.  No obvious apnea.  No daytime sleepiness.  Takes 1/2 hour to fall asleep. Wakes up at night to urinate once and sometimes has trouble going back to sleep.  She is taking melatonin and .   Also taking 5-HTP 50 mg three times a day.  Has past history of elevated cortisol on Meg Adrenal testing.  Had some worsening depression this past year due to family issues.    3) Chronic Lyme's - Working with Dr. Bernarda Zenker. She has lot of co-infections. She is taking a lot of supplements/ herbals.  She continues to have a lot of fatigue.    OBJECTIVE: /70 (Patient Site: Left Arm, Patient Position: Sitting, Cuff Size: Adult Regular)   Pulse 60   Ht 5' 5.5\" (1.664 m)   Wt 160 lb (72.6 kg)   LMP 01/28/2006   SpO2 97%   BMI 26.22 kg/m   no distress  Scalp:  No rash / flaking skin.  PSYCH:  Awake, alert, and oriented x 3.  Mood and affect are depressed.  Good eye contact.  Speech is normal.  No suicidal ideation.  No hallucinations.    Adalgisa was seen today for hair/scalp problem and insomnia.    Diagnoses and all orders for this visit:    Chronic fatigue- Recheck salivary cortisol profile.  Consider adding BEMER therapy to treatment regime.  -     Cortisol, Saliva; Future  -     Cortisol, Saliva; Future  -     Cortisol, Saliva; Future  -     Cortisol, Saliva; " Future    Itchy scalp- Will have her try Ayurvedic shampoo with lila.     .  Patient Instructions   CAROLINAMER -Your contact:[email or web address]    Here are some additional instructions for the 24 hour salivary cortisol testing:    Preparation prior to testin) It is important to collect the saliva during the specified time frame. If you have trouble producing enough saliva: Rinse mouth with water and spit out.  Press tip of tongue to roof of mouth against teeth. Thinks of sour foods.  2) Do not eat or drink anything except water 1 hour prior to collection.  Remove all lip balm and lipstick.  3) No alcohol 12 hours prior to collection.  4) Do not brush teeth immediately before collection as gums may bleed and contaminate specimen.  5) Rinse mouth with water 10 minutes before collection.    You will be collecting 4 samples in 4 different tubes over a 24 hour period.  The time frames are below:    1) 7:00 am - 9:00 am  2) 11:00 am - 1:00 pm  3) 3:00 pm - 5:00 pm  4) 10:00 pm - 12: am    Follow the collection instructions on the other page then refrigerate samples until you can bring them into the clinic.    Make sure you have labeled each tube properly with the time of collection.    [employer]  [employer] Ayurvedic shampoo with lila Walsh

## 2021-07-03 NOTE — ADDENDUM NOTE
Addendum Note by Mable Andrews at 9/5/2018  2:29 PM     Author: Mable Andrews Service: -- Author Type:     Filed: 9/5/2018  2:29 PM Encounter Date: 8/24/2018 Status: Signed    : Mable Andrews ()    Addended by: MABLE ANDREWS on: 9/5/2018 02:29 PM        Modules accepted: Orders

## 2021-07-03 NOTE — ADDENDUM NOTE
Addendum Note by Melissa Shepherd MD at 3/3/2020  2:22 PM     Author: Melissa Shepherd MD Service: -- Author Type: Physician    Filed: 3/3/2020  2:22 PM Encounter Date: 3/2/2020 Status: Signed    : Melissa Shepherd MD (Physician)    Addended by: MELISSA SHEPHERD on: 3/3/2020 02:22 PM        Modules accepted: Orders

## 2021-07-03 NOTE — ADDENDUM NOTE
Addendum Note by Mable Andrews at 9/5/2018  2:12 PM     Author: Mable Andrews Service: -- Author Type:     Filed: 9/5/2018  2:12 PM Encounter Date: 8/24/2018 Status: Signed    : Mable Andrews ()    Addended by: MABLE ANDREWS on: 9/5/2018 02:12 PM        Modules accepted: Orders

## 2021-07-04 NOTE — ADDENDUM NOTE
Addendum Note by Hardik Mac RT (R) at 4/12/2021  9:00 AM     Author: Hardik Mac RT (R) Service: -- Author Type: Radiologic Technologist    Filed: 4/13/2021  1:04 PM Encounter Date: 4/12/2021 Status: Signed    : Hardik Mac RT (R) (Radiologic Technologist)    Addended by: HARDIK MAC on: 4/13/2021 01:04 PM        Modules accepted: Orders

## 2021-07-22 ENCOUNTER — TRANSFERRED RECORDS (OUTPATIENT)
Dept: HEALTH INFORMATION MANAGEMENT | Facility: CLINIC | Age: 71
End: 2021-07-22

## 2021-08-06 NOTE — PATIENT INSTRUCTIONS - HE
Patient Instructions by Melissa Walsh MD at 4/12/2021  9:00 AM     Author: Melissa Walsh MD Service: -- Author Type: Physician    Filed: 4/12/2021  9:50 AM Encounter Date: 4/12/2021 Status: Addendum    : Melissa Walsh MD (Physician)    Related Notes: Original Note by Melissa Walsh MD (Physician) filed at 4/12/2021  9:13 AM         Patient Education   Your Health Risk Assessment indicates you feel you are not in good emotional health.    Recreation   Recreation is not limited to sports and team events. It includes any activity that provides relaxation, interest, enjoyment, and exercise. Recreation provides an outlet for physical, mental, and social energy. It can give a sense of worth and achievement. It can help you stay healthy.    Mental Exercise and Social Involvement  Mental and emotional health is as important as physical health. Keep in touch with friends and family. Stay as active as possible. Continue to learn and challenge yourself.   Things you can do to stay mentally active are:    Learn something new, like a foreign language or musical instrument.     Play SCRABBLE or do crossword puzzles. If you cannot find people to play these games with you at home, you can play them with others on your computer through the Internet.     Join a games club--anything from card games to chess or checkers or lawn bowling.     Start a new hobby.     Go back to school.     Volunteer.     Read.     Keep up with world events.       Patient Education   Depression and Suicide in Older Adults  Nearly 2 million older Americans have some type of depression. Sadly, some of them even take their own lives. Yet depression among older adults is often ignored. Learn the warning signs. You may help spare a loved one needless pain. You may also save a life.       What Is Depression?  Depression is a mood disorder that affects the way you think and feel. The most common symptom is a  feeling of deep sadness. People who are depressed also may seem tired and listless. And nothing seems to give them pleasure. Its normal to grieve or be sad sometimes. But sadness lessens or passes with time. Depression rarely goes away or improves on its own. Other symptoms of depression are:    Sleeping more or less than normal    Eating more or less than normal    Having headaches, stomachaches, or other pains that dont go away    Feeling nervous, empty, or worthless    Crying a great deal    Thinking or talking about suicide or death    Feeling confused or forgetful  What Causes It?  The causes of depression arent fully known. Certain chemicals in the brain play a role. Depression does run in families. And life stresses can also trigger depression in some people. That may be the case with older adults. They often face great burdens, such as the death of friends or a spouse. They may have failing health. And they are more likely to be alone, lonely, or poor.  How You Can Help  Often, depressed people may not want to ask for help. When they do, they may be ignored. Or, they may receive the wrong treatment. You can help by showing parents and older friends love and support. If they seem depressed, help them find the right treatment. Talk to your doctor. Or contact a local mental health center, social service agency, or hospital. With modern treatment, no one has to suffer from depression.  Resources:    National [place] of Mental Health  650.413.3748  www.nimh.nih.gov    National [place] on Mental Illness  688.198.4752  www.incky.org    M[facility] Health [facility]  875.742.4956  www.[facility].org    National Suicide Hotline  469.221.4936 (800-SUICIDE)      3736-9196 [employer]. [address] [place] [address]. All rights reserved. This information is not intended as a substitute for professional medical care. Always follow your healthcare professional's instructions.         Patient Education    Preventing Falls in the Home  As you get older, falls are more likely. Thats because your reaction time slows. Your muscles and joints may also get stiffer, making them less flexible. Illness, medications, and vision changes can also affect your balance. A fall could leave you unable to live on your own. To make your home safer, follow these tips:    Floors    Put nonskid pads under area rugs.    Remove throw rugs.    Replace worn floor coverings.    Tack carpets firmly to each step on carpeted stairs. Put nonskid strips on the edges of uncarpeted stairs.    Keep floors and stairs free of clutter and cords.    Arrange furniture so there are clear pathways.    Clean up any spills right away.    Bathrooms    Install grab bars in the tub or shower.    Apply nonskid strips or put a nonskid rubber mat in the tub or shower.    Sit on a bath chair to bathe.    Use bathmats with nonskid backing.    Lighting    Keep a flashlight in each room.    Put a nightlight along the pathway between the bedroom and the bathroom.    7682-1523 [employer]. [address] [place] [address]. All rights reserved. This information is not intended as a substitute for professional medical care. Always follow your healthcare professional's instructions.           Advance Directive  Patients advance directive was discussed and I am comfortable with the patients wishes.  Patient Education   Personalized Prevention Plan  You are due for the preventive services outlined below.  Your care team is available to assist you in scheduling these services.  If you have already completed any of these items, please share that information with your care team to update in your medical record.  Health Maintenance Due   Topic Date Due   ? HEPATITIS C SCREENING  Never done   ? DEXA SCAN  Never done   ? LIPID  Never done   ? ZOSTER VACCINES (1 of 2) Never done   ? Pneumococcal Vaccine: 65+ Years (1 of 1 - PPSV23) Never done   ? INFLUENZA  VACCINE RULE BASED (1) Never done           Consider following supplements for mood:    [employer] CopaCalm - 1-2 dropperfuls as needed daily for anxiety  [employer] CereVive - Take 4 daily

## 2021-08-26 ENCOUNTER — TELEPHONE (OUTPATIENT)
Dept: FAMILY MEDICINE | Facility: CLINIC | Age: 71
End: 2021-08-26

## 2021-08-26 NOTE — TELEPHONE ENCOUNTER
Reason for Call:  Same Day Appointment, Requested Provider:      PCP: Melissa Walsh                                    Reason for visit: sebaceous cyst, message from Dr Walsh she would be available next week       Duration of symptoms:     Have you been treated for this in the past? No    Additional comments:     Can we leave a detailed message on this number? YES    Phone number patient can be reached at: Home number on file [phone] (home)    Best Time:     Call taken on 8/26/2021 at 11:24 AM by Ana Paula Berumen

## 2021-08-30 ENCOUNTER — ANCILLARY PROCEDURE (OUTPATIENT)
Dept: MAMMOGRAPHY | Facility: CLINIC | Age: 71
End: 2021-08-30
Attending: FAMILY MEDICINE
Payer: COMMERCIAL

## 2021-08-30 DIAGNOSIS — Z12.31 VISIT FOR SCREENING MAMMOGRAM: ICD-10-CM

## 2021-08-30 PROCEDURE — 77063 BREAST TOMOSYNTHESIS BI: CPT | Mod: GC | Performed by: RADIOLOGY

## 2021-08-30 PROCEDURE — 77067 SCR MAMMO BI INCL CAD: CPT | Mod: GC | Performed by: RADIOLOGY

## 2021-09-02 ENCOUNTER — OFFICE VISIT (OUTPATIENT)
Dept: FAMILY MEDICINE | Facility: CLINIC | Age: 71
End: 2021-09-02
Payer: COMMERCIAL

## 2021-09-02 VITALS
HEART RATE: 56 BPM | BODY MASS INDEX: 24.68 KG/M2 | WEIGHT: 147.2 LBS | SYSTOLIC BLOOD PRESSURE: 110 MMHG | OXYGEN SATURATION: 98 % | DIASTOLIC BLOOD PRESSURE: 58 MMHG

## 2021-09-02 DIAGNOSIS — L72.3 INFECTED SEBACEOUS CYST: Primary | ICD-10-CM

## 2021-09-02 DIAGNOSIS — L08.9 INFECTED SEBACEOUS CYST: Primary | ICD-10-CM

## 2021-09-02 PROCEDURE — 11403 EXC TR-EXT B9+MARG 2.1-3CM: CPT | Performed by: FAMILY MEDICINE

## 2021-09-02 RX ORDER — ACETAMINOPHEN 325 MG/1
325 TABLET ORAL ONCE
Status: COMPLETED | OUTPATIENT
Start: 2021-09-02 | End: 2021-09-02

## 2021-09-02 RX ORDER — CHLORAL HYDRATE 500 MG
1 CAPSULE ORAL DAILY
COMMUNITY

## 2021-09-02 RX ORDER — KETOCONAZOLE 20 MG/ML
SHAMPOO TOPICAL
COMMUNITY
Start: 2021-08-21 | End: 2022-05-31

## 2021-09-02 RX ORDER — LIOTHYRONINE SODIUM 5 UG/1
TABLET ORAL
COMMUNITY
Start: 2021-08-02 | End: 2022-05-31

## 2021-09-02 RX ORDER — LANOLIN ALCOHOL/MO/W.PET/CERES
3 CREAM (GRAM) TOPICAL
COMMUNITY
Start: 2021-02-03

## 2021-09-02 RX ORDER — CHLORAL HYDRATE 500 MG
1 CAPSULE ORAL
COMMUNITY
End: 2021-09-02

## 2021-09-02 RX ORDER — LEVOTHYROXINE SODIUM 137 UG/1
TABLET ORAL
COMMUNITY
Start: 2021-07-29 | End: 2022-04-01

## 2021-09-02 RX ORDER — CHOLESTYRAMINE LIGHT 4 G/5.7G
POWDER, FOR SUSPENSION ORAL
COMMUNITY
Start: 2020-09-14 | End: 2022-05-31

## 2021-09-02 RX ORDER — APIXABAN 5 MG/1
5 TABLET, FILM COATED ORAL 2 TIMES DAILY
COMMUNITY
Start: 2021-08-02 | End: 2022-05-31

## 2021-09-02 RX ORDER — CEPHALEXIN 500 MG/1
500 CAPSULE ORAL 3 TIMES DAILY
Qty: 15 CAPSULE | Refills: 0 | Status: SHIPPED | OUTPATIENT
Start: 2021-09-02 | End: 2022-05-31

## 2021-09-02 RX ADMIN — ACETAMINOPHEN 325 MG: 325 TABLET ORAL at 15:07

## 2021-09-04 ENCOUNTER — HEALTH MAINTENANCE LETTER (OUTPATIENT)
Age: 71
End: 2021-09-04

## 2021-09-07 NOTE — PROGRESS NOTES
Adalgisa was seen today for procedure.    Diagnoses and all orders for this visit:    Infected sebaceous cyst  -     cephALEXin (KEFLEX) 500 MG capsule; Take 1 capsule (500 mg) by mouth 3 times daily  -     acetaminophen (TYLENOL) tablet 325 mg      PROCEDURE:  The cyst was cleansed with alcohol and anesthesia was obtained with 1% lidocaine with epi.  A #15 scalpel was used to make incision and large amount of sebum/ pus expressed from the wound.  The cyst wall was adhered to surrounding tissue so difficult to remove completely.  Wound was dressed with bandage and instructions for wound care given.  Pt given Tylenol for pain.  She was also given rx for Keflex to use if the wound became red or more painful in the next few days.  She may need to see surgery once it has healed for definitive excision of the cyst.    SUBJECTIVE: Adalgisa Dennis is a 71 year old female who presents with the following:  Patient presents with:  Procedure: Cyst removal    She has sebaceous cyst on her mid back that has become red / enlarged and painful.  She had it lanced in past but it came back.  She is concerned [place] and will not have access to medical care.  No fevers/ chills/ malaise.  No drainage.    OBJECTIVE: /58 (BP Location: Left arm, Patient Position: Sitting, Cuff Size: Adult Regular)   Pulse 56   Wt 66.8 kg (147 lb 3.2 oz)   LMP 09/14/2006   SpO2 98%   BMI 24.68 kg/m   no distress  Back: 3 cm erythematous lump that is fluctuant.        Melissa Walsh MD

## 2021-09-15 ENCOUNTER — TRANSFERRED RECORDS (OUTPATIENT)
Dept: HEALTH INFORMATION MANAGEMENT | Facility: CLINIC | Age: 71
End: 2021-09-15

## 2021-10-23 ENCOUNTER — MYC MEDICAL ADVICE (OUTPATIENT)
Dept: FAMILY MEDICINE | Facility: CLINIC | Age: 71
End: 2021-10-23

## 2021-10-23 DIAGNOSIS — F41.0 ANXIETY ATTACK: ICD-10-CM

## 2021-10-23 DIAGNOSIS — F40.243 FLYING PHOBIA: ICD-10-CM

## 2021-10-23 DIAGNOSIS — Z86.39 PERSONAL HISTORY OF OTHER ENDOCRINE, NUTRITIONAL AND METABOLIC DISEASE: ICD-10-CM

## 2021-10-23 DIAGNOSIS — R53.82 CHRONIC FATIGUE: Primary | ICD-10-CM

## 2021-10-25 ENCOUNTER — MYC MEDICAL ADVICE (OUTPATIENT)
Dept: FAMILY MEDICINE | Facility: CLINIC | Age: 71
End: 2021-10-25

## 2021-10-25 DIAGNOSIS — R53.82 CHRONIC FATIGUE: Primary | ICD-10-CM

## 2021-10-25 RX ORDER — LORAZEPAM 0.5 MG/1
TABLET ORAL
Qty: 30 TABLET | Refills: 0 | Status: SHIPPED | OUTPATIENT
Start: 2021-10-25 | End: 2022-05-31

## 2021-10-25 NOTE — TELEPHONE ENCOUNTER
Disp Refills Start End CHITO    LORazepam (ATIVAN) 0.5 MG tablet 30 tablet 0 4/12/2021  No   Sig - Route: Take 1 tablet (0.5 mg total) by mouth daily as needed for anxiety. - Oral   Sent to pharmacy as: LORazepam 0.5 mg tablet (ATIVAN)   E-Prescribing Status: Receipt confirmed by pharmacy (4/12/2021  9:29 AM CDT)       LORazepam (ATIVAN) 0.5 MG tablet [[id number]]    Electronically signed by: Melissa Walsh MD on 04/12/21 0929 Status: Active   Ordering user: Melissa Walsh MD 04/12/21 0929 Authorized by: Melissa Walsh MD   PRN reasons: anxiety   Frequency: Daily PRN 04/12/21 - Until Discontinued Released by: Melissa Walsh MD 04/12/21 0929   Diagnoses  Anxiety [F41.9]     Routing refill request to provider for review/approval because:  Controlled substance request    Last office visit provider:  9/2/21     Requested Prescriptions   Pending Prescriptions Disp Refills     LORazepam (ATIVAN) 0.5 MG tablet [Pharmacy Med Name: LORAZEPAM 0.5 MG TABLET** 0.5 Tablet] 30 tablet 0     Sig: TAKE 1 TABLET (0.5 MG TOTAL) BY MOUTH DAILY AS NEEDED FOR ANXIETY.       There is no refill protocol information for this order          Woo Lord RN 10/25/21 8:38 AM

## 2021-10-26 ENCOUNTER — LAB (OUTPATIENT)
Dept: LAB | Facility: CLINIC | Age: 71
End: 2021-10-26
Payer: COMMERCIAL

## 2021-10-26 DIAGNOSIS — R53.82 CHRONIC FATIGUE: ICD-10-CM

## 2021-10-26 DIAGNOSIS — Z86.39 PERSONAL HISTORY OF OTHER ENDOCRINE, NUTRITIONAL AND METABOLIC DISEASE: ICD-10-CM

## 2021-10-26 LAB
CRP SERPL HS-MCNC: 0.2 MG/L
HBA1C MFR BLD: 5.7 % (ref 0–5.6)

## 2021-10-26 PROCEDURE — 36415 COLL VENOUS BLD VENIPUNCTURE: CPT

## 2021-10-26 PROCEDURE — 83036 HEMOGLOBIN GLYCOSYLATED A1C: CPT

## 2021-10-26 PROCEDURE — 83090 ASSAY OF HOMOCYSTEINE: CPT

## 2021-10-26 PROCEDURE — 86141 C-REACTIVE PROTEIN HS: CPT

## 2021-10-27 LAB — HCYS SERPL-SCNC: 8.6 UMOL/L (ref 4–12)

## 2021-11-02 ENCOUNTER — VIRTUAL VISIT (OUTPATIENT)
Dept: FAMILY MEDICINE | Facility: CLINIC | Age: 71
End: 2021-11-02
Payer: COMMERCIAL

## 2021-11-02 DIAGNOSIS — R73.03 PREDIABETES: ICD-10-CM

## 2021-11-02 DIAGNOSIS — F41.0 ANXIETY ATTACK: ICD-10-CM

## 2021-11-02 DIAGNOSIS — J01.10 ACUTE NON-RECURRENT FRONTAL SINUSITIS: Primary | ICD-10-CM

## 2021-11-02 PROCEDURE — 99213 OFFICE O/P EST LOW 20 MIN: CPT | Mod: 95 | Performed by: FAMILY MEDICINE

## 2021-11-02 ASSESSMENT — PATIENT HEALTH QUESTIONNAIRE - PHQ9: SUM OF ALL RESPONSES TO PHQ QUESTIONS 1-9: 3

## 2021-11-02 NOTE — PROGRESS NOTES
Adalgisa is a 71 year old who is being evaluated via a billable video visit.      How would you like to obtain your AVS? MyChart  If the video visit is dropped, the invitation should be resent by: Send to e-mail at: [email or web address]  Will anyone else be joining your video visit? No      Video Start Time: 5:00 PM    Assessment & Plan     Adalgisa was seen today for results.    Diagnoses and all orders for this visit:    Acute non-recurrent frontal sinusitis- Improved but she is traveling out of country so will rx antibiotics in case symptoms worsen.  -     amoxicillin-clavulanate (AUGMENTIN) 875-125 MG tablet; Take 1 tablet by mouth 2 times daily for 7 days  Consider use of Xlear nasal spray for prevention.    Prediabetes- Cut back on sugar/ fruit/ processed carbs.  Recheck 6 months.    Anxiety attack- Stable use lorazepm.  Recheck 6 months.                       No follow-ups on file.    Melissa Walsh MD  [facility]   Adalgisa is a 71 year old who presents for the following health issues     HPI   1) She has had sinus symptoms for last 3 weeks.  Had several COVID tests that were negative.  Did some work with steam/ vitamin A/ zinc and doing better.  Has pressure in head/ ears.  Has little congestion in sinuses. Had pain with pressure over nose/ head.  Has been sneezing for past 2 weeks but no rhinorrhea.  No URI.   Tried pseudoephedrine but no helpful.  She is leaving for [place] in a few days.      2) She saw nutritionist and is doing a 6 week cleansing diet.  Got worse after eating almond butter  / chocolate milk.      3) She uses lorazepam PRN.  Has used it for sleep at times but does not like side effects.  She uses lorazepam for plane trips    4) Prediabetes - Her A1C is 5.7. She does not eat sugar but has been eating a diet rich in fruit.            Review of Systems         Objective           Vitals:  No vitals were obtained today due to virtual  visit.    Physical Exam   GENERAL: Healthy, alert and no distress  EYES: Eyes grossly normal to inspection.  No discharge or erythema, or obvious scleral/conjunctival abnormalities.  RESP: No audible wheeze, cough, or visible cyanosis.  No visible retractions or increased work of breathing.    SKIN: Visible skin clear. No significant rash, abnormal pigmentation or lesions.  NEURO: Cranial nerves grossly intact.  Mentation and speech appropriate for age.  PSYCH: Mentation appears normal, affect normal/bright, judgement and insight intact, normal speech and appearance well-groomed.    Lab on 10/26/2021   Component Date Value Ref Range Status     Hemoglobin A1C 10/26/2021 5.7* 0.0 - 5.6 % Final    Normal <5.7%   Prediabetes 5.7-6.4%    Diabetes 6.5% or higher     Note: Adopted from ADA consensus guidelines.     C-Reactive Protein High Sensitivity 10/26/2021 0.2  mg/L Final    Low risk < 1.0 mg/L  Average risk 1.0 to 3.0 mg/L  High risk > 3.0 mg/L  Acute inflamation > 10.0 mg/L     Homocysteine 10/26/2021 8.6  4.0 - 12.0 umol/L Final    A normal plasma homocysteine level likely rules out genetic defects in homocysteine metabolism.               Video-Visit Details    Type of service:  Video Visit    Video End Time:5:15 pm    Originating Location (pt. Location): Home    Distant Location (provider location):  [facility]     Platform used for Video Visit: [employer]

## 2021-11-03 PROBLEM — R73.03 PREDIABETES: Status: ACTIVE | Noted: 2021-11-03

## 2021-12-07 ENCOUNTER — VIRTUAL VISIT (OUTPATIENT)
Dept: FAMILY MEDICINE | Facility: CLINIC | Age: 71
End: 2021-12-07
Payer: COMMERCIAL

## 2021-12-07 DIAGNOSIS — M25.511 CHRONIC RIGHT SHOULDER PAIN: Primary | ICD-10-CM

## 2021-12-07 DIAGNOSIS — K64.4 EXTERNAL HEMORRHOIDS: ICD-10-CM

## 2021-12-07 DIAGNOSIS — N89.8 VAGINAL DRYNESS: ICD-10-CM

## 2021-12-07 DIAGNOSIS — G89.29 CHRONIC RIGHT SHOULDER PAIN: Primary | ICD-10-CM

## 2021-12-07 PROCEDURE — 99213 OFFICE O/P EST LOW 20 MIN: CPT | Mod: 95 | Performed by: FAMILY MEDICINE
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
## 2021-12-17 ENCOUNTER — TRANSFERRED RECORDS (OUTPATIENT)
Dept: HEALTH INFORMATION MANAGEMENT | Facility: CLINIC | Age: 71
End: 2021-12-17
Payer: COMMERCIAL

## 2022-01-06 RX ORDER — LEVOTHYROXINE SODIUM 137 UG/1
TABLET ORAL
Qty: 90 TABLET | Refills: 3 | OUTPATIENT
Start: 2022-01-06

## 2022-01-06 NOTE — TELEPHONE ENCOUNTER
Refilled 4/12/21 with 3 refills.    Outpatient Medication Detail     Disp Refills Start End CHITO   levothyroxine (SYNTHROID, LEVOTHROID) 137 MCG tablet 90 tablet 3 4/12/2021  No   Sig - Route: Take 1 tablet (137 mcg total) by mouth daily. - Oral   Sent to pharmacy as: levothyroxine 137 mcg tablet (SYNTHROID, LEVOTHROID)   E-Prescribing Status: Receipt confirmed by pharmacy (4/12/2021  9:29 AM CDT)

## 2022-03-18 ENCOUNTER — TRANSFERRED RECORDS (OUTPATIENT)
Dept: HEALTH INFORMATION MANAGEMENT | Facility: CLINIC | Age: 72
End: 2022-03-18
Payer: COMMERCIAL

## 2022-03-21 ENCOUNTER — TRANSFERRED RECORDS (OUTPATIENT)
Dept: HEALTH INFORMATION MANAGEMENT | Facility: CLINIC | Age: 72
End: 2022-03-21
Payer: COMMERCIAL

## 2022-05-07 ENCOUNTER — APPOINTMENT (OUTPATIENT)
Dept: CT IMAGING | Facility: CLINIC | Age: 72
End: 2022-05-07
Attending: EMERGENCY MEDICINE
Payer: COMMERCIAL

## 2022-05-07 ENCOUNTER — HOSPITAL ENCOUNTER (EMERGENCY)
Facility: CLINIC | Age: 72
Discharge: HOME | End: 2022-05-07
Attending: EMERGENCY MEDICINE | Admitting: EMERGENCY MEDICINE
Payer: COMMERCIAL

## 2022-05-07 ENCOUNTER — NURSE TRIAGE (OUTPATIENT)
Dept: NURSING | Facility: CLINIC | Age: 72
End: 2022-05-07
Payer: COMMERCIAL

## 2022-05-07 VITALS
SYSTOLIC BLOOD PRESSURE: 116 MMHG | RESPIRATION RATE: 18 BRPM | TEMPERATURE: 97.4 F | DIASTOLIC BLOOD PRESSURE: 59 MMHG | OXYGEN SATURATION: 98 % | HEART RATE: 65 BPM
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
## 2022-05-07 DIAGNOSIS — S09.90XA HEAD INJURY: ICD-10-CM

## 2022-05-07 PROCEDURE — 70450 CT HEAD/BRAIN W/O DYE: CPT

## 2022-05-07 PROCEDURE — 99284 EMERGENCY DEPT VISIT MOD MDM: CPT | Mod: 25

## 2022-05-07 PROCEDURE — 99284 EMERGENCY DEPT VISIT MOD MDM: CPT | Performed by: EMERGENCY MEDICINE
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
## 2022-05-07 NOTE — DISCHARGE INSTRUCTIONS
Please make an appointment to follow up with Your Primary Care Provider and Primary Care Center (phone: [phone]) as soon as possible as needed.
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
## 2022-05-09 ENCOUNTER — PATIENT OUTREACH (OUTPATIENT)
Dept: CARE COORDINATION | Facility: CLINIC | Age: 72
End: 2022-05-09
Payer: COMMERCIAL

## 2022-05-09 DIAGNOSIS — Z71.89 OTHER SPECIFIED COUNSELING: ICD-10-CM

## 2022-05-09 NOTE — PROGRESS NOTES
Clinic Care Coordination Contact  [facility]/Voicemail       Clinical Data: Care Coordinator Outreach  Outreach attempted x 1.  Left message on patient's voicemail with call back information and requested return call.  Plan: CC will attempt to reach patient again in 1-2 business days
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
## 2022-05-22 ENCOUNTER — TRANSFERRED RECORDS (OUTPATIENT)
Dept: HEALTH INFORMATION MANAGEMENT | Facility: CLINIC | Age: 72
End: 2022-05-22
Payer: COMMERCIAL

## 2022-05-31 ENCOUNTER — TRANSFERRED RECORDS (OUTPATIENT)
Dept: HEALTH INFORMATION MANAGEMENT | Facility: CLINIC | Age: 72
End: 2022-05-31

## 2022-05-31 ENCOUNTER — OFFICE VISIT (OUTPATIENT)
Dept: FAMILY MEDICINE | Facility: CLINIC | Age: 72
End: 2022-05-31
Payer: COMMERCIAL

## 2022-05-31 VITALS
OXYGEN SATURATION: 98 % | DIASTOLIC BLOOD PRESSURE: 52 MMHG | HEART RATE: 65 BPM | SYSTOLIC BLOOD PRESSURE: 110 MMHG | WEIGHT: 139.1 LBS | TEMPERATURE: 97.8 F | RESPIRATION RATE: 16 BRPM | HEIGHT: 65 IN | BODY MASS INDEX: 23.17 KG/M2

## 2022-05-31 DIAGNOSIS — F41.0 ANXIETY ATTACK: ICD-10-CM

## 2022-05-31 DIAGNOSIS — I48.0 PAROXYSMAL ATRIAL FIBRILLATION (H): ICD-10-CM

## 2022-05-31 DIAGNOSIS — E03.9 ACQUIRED HYPOTHYROIDISM: ICD-10-CM

## 2022-05-31 DIAGNOSIS — Z00.00 HEALTH CARE MAINTENANCE: Primary | ICD-10-CM

## 2022-05-31 DIAGNOSIS — Z13.228 ENCOUNTER FOR SCREENING FOR OTHER METABOLIC DISORDERS: ICD-10-CM

## 2022-05-31 DIAGNOSIS — E55.9 VITAMIN D DEFICIENCY: ICD-10-CM

## 2022-05-31 DIAGNOSIS — R53.83 FATIGUE, UNSPECIFIED TYPE: ICD-10-CM

## 2022-05-31 DIAGNOSIS — F40.243 FLYING PHOBIA: ICD-10-CM

## 2022-05-31 LAB
ALBUMIN SERPL-MCNC: 3.9 G/DL (ref 3.5–5)
ALP SERPL-CCNC: 85 U/L (ref 45–120)
ALT SERPL W P-5'-P-CCNC: 15 U/L (ref 0–45)
ANION GAP SERPL CALCULATED.3IONS-SCNC: 11 MMOL/L (ref 5–18)
AST SERPL W P-5'-P-CCNC: 14 U/L (ref 0–40)
BILIRUB SERPL-MCNC: 0.5 MG/DL (ref 0–1)
BUN SERPL-MCNC: 17 MG/DL (ref 8–28)
CALCIUM SERPL-MCNC: 9.2 MG/DL (ref 8.5–10.5)
CHLORIDE BLD-SCNC: 102 MMOL/L (ref 98–107)
CHOLEST SERPL-MCNC: 187 MG/DL
CO2 SERPL-SCNC: 26 MMOL/L (ref 22–31)
CREAT SERPL-MCNC: 0.71 MG/DL (ref 0.6–1.1)
CRP SERPL HS-MCNC: 0.1 MG/L (ref 0–3)
ERYTHROCYTE [DISTWIDTH] IN BLOOD BY AUTOMATED COUNT: 13.3 % (ref 10–15)
FASTING STATUS PATIENT QL REPORTED: NO
GFR SERPL CREATININE-BSD FRML MDRD: 90 ML/MIN/1.73M2
GLUCOSE BLD-MCNC: 104 MG/DL (ref 70–125)
HCT VFR BLD AUTO: 40.6 % (ref 35–47)
HDLC SERPL-MCNC: 62 MG/DL
HGB BLD-MCNC: 13.5 G/DL (ref 11.7–15.7)
LDLC SERPL CALC-MCNC: 111 MG/DL
MCH RBC QN AUTO: 30.2 PG (ref 26.5–33)
MCHC RBC AUTO-ENTMCNC: 33.3 G/DL (ref 31.5–36.5)
MCV RBC AUTO: 91 FL (ref 78–100)
PLATELET # BLD AUTO: 152 10E3/UL (ref 150–450)
POTASSIUM BLD-SCNC: 4.9 MMOL/L (ref 3.5–5)
PROT SERPL-MCNC: 6.2 G/DL (ref 6–8)
RBC # BLD AUTO: 4.47 10E6/UL (ref 3.8–5.2)
SODIUM SERPL-SCNC: 139 MMOL/L (ref 136–145)
T3FREE SERPL-MCNC: 3.1 PG/ML (ref 1.6–3.9)
T4 FREE SERPL-MCNC: 1.2 NG/DL (ref 0.7–1.8)
TSH SERPL DL<=0.005 MIU/L-ACNC: 0.04 UIU/ML (ref 0.3–5)
WBC # BLD AUTO: 9.7 10E3/UL (ref 4–11)

## 2022-05-31 PROCEDURE — 83718 ASSAY OF LIPOPROTEIN: CPT | Performed by: FAMILY MEDICINE

## 2022-05-31 PROCEDURE — 83721 ASSAY OF BLOOD LIPOPROTEIN: CPT | Mod: 59 | Performed by: FAMILY MEDICINE

## 2022-05-31 PROCEDURE — 84439 ASSAY OF FREE THYROXINE: CPT | Performed by: FAMILY MEDICINE

## 2022-05-31 PROCEDURE — 82465 ASSAY BLD/SERUM CHOLESTEROL: CPT | Performed by: FAMILY MEDICINE

## 2022-05-31 PROCEDURE — 82306 VITAMIN D 25 HYDROXY: CPT | Performed by: FAMILY MEDICINE

## 2022-05-31 PROCEDURE — 86141 C-REACTIVE PROTEIN HS: CPT | Performed by: FAMILY MEDICINE

## 2022-05-31 PROCEDURE — G0439 PPPS, SUBSEQ VISIT: HCPCS | Performed by: FAMILY MEDICINE

## 2022-05-31 PROCEDURE — 36415 COLL VENOUS BLD VENIPUNCTURE: CPT | Performed by: FAMILY MEDICINE

## 2022-05-31 PROCEDURE — 84481 FREE ASSAY (FT-3): CPT | Performed by: FAMILY MEDICINE

## 2022-05-31 PROCEDURE — 84443 ASSAY THYROID STIM HORMONE: CPT | Performed by: FAMILY MEDICINE

## 2022-05-31 PROCEDURE — 80053 COMPREHEN METABOLIC PANEL: CPT | Performed by: FAMILY MEDICINE

## 2022-05-31 PROCEDURE — 85027 COMPLETE CBC AUTOMATED: CPT | Performed by: FAMILY MEDICINE

## 2022-05-31 RX ORDER — LORAZEPAM 0.5 MG/1
TABLET ORAL
Qty: 30 TABLET | Refills: 0 | Status: SHIPPED | OUTPATIENT
Start: 2022-05-31 | End: 2022-10-28

## 2022-05-31 RX ORDER — LIOTHYRONINE SODIUM 5 UG/1
TABLET ORAL
Qty: 60 TABLET | Refills: 0 | Status: SHIPPED | OUTPATIENT
Start: 2022-05-31 | End: 2022-07-01

## 2022-05-31 RX ORDER — PROPRANOLOL HYDROCHLORIDE 20 MG/1
TABLET ORAL
Qty: 50 TABLET | Refills: 1 | Status: SHIPPED | OUTPATIENT
Start: 2022-05-31 | End: 2023-06-20

## 2022-05-31 RX ORDER — LEVOTHYROXINE SODIUM 137 UG/1
TABLET ORAL
Qty: 30 TABLET | Refills: 0 | Status: SHIPPED | OUTPATIENT
Start: 2022-05-31 | End: 2022-08-21

## 2022-05-31 RX ORDER — APIXABAN 5 MG/1
5 TABLET, FILM COATED ORAL 2 TIMES DAILY
Qty: 60 TABLET | Refills: 11 | Status: SHIPPED | OUTPATIENT
Start: 2022-05-31 | End: 2023-04-26

## 2022-05-31 ASSESSMENT — ENCOUNTER SYMPTOMS
ABDOMINAL PAIN: 0
PARESTHESIAS: 0
HEMATOCHEZIA: 0
CONSTIPATION: 1
HEARTBURN: 0
PALPITATIONS: 0
SHORTNESS OF BREATH: 0
HEMATURIA: 0
WEAKNESS: 0
SORE THROAT: 0
CHILLS: 0
DIZZINESS: 0
NERVOUS/ANXIOUS: 1
EYE PAIN: 0
JOINT SWELLING: 0
DYSURIA: 0
FEVER: 0
DIARRHEA: 0
FREQUENCY: 0
MYALGIAS: 1
HEADACHES: 0
NAUSEA: 0
COUGH: 0
ARTHRALGIAS: 0

## 2022-05-31 ASSESSMENT — ACTIVITIES OF DAILY LIVING (ADL): CURRENT_FUNCTION: NO ASSISTANCE NEEDED

## 2022-05-31 ASSESSMENT — PATIENT HEALTH QUESTIONNAIRE - PHQ9
10. IF YOU CHECKED OFF ANY PROBLEMS, HOW DIFFICULT HAVE THESE PROBLEMS MADE IT FOR YOU TO DO YOUR WORK, TAKE CARE OF THINGS AT HOME, OR GET ALONG WITH OTHER PEOPLE: NOT DIFFICULT AT ALL
SUM OF ALL RESPONSES TO PHQ QUESTIONS 1-9: 2
SUM OF ALL RESPONSES TO PHQ QUESTIONS 1-9: 2

## 2022-06-01 LAB — DEPRECATED CALCIDIOL+CALCIFEROL SERPL-MC: 61 UG/L (ref 20–75)

## 2022-06-29 ENCOUNTER — TRANSFERRED RECORDS (OUTPATIENT)
Dept: HEALTH INFORMATION MANAGEMENT | Facility: CLINIC | Age: 72
End: 2022-06-29

## 2022-08-30 ENCOUNTER — TRANSFERRED RECORDS (OUTPATIENT)
Dept: HEALTH INFORMATION MANAGEMENT | Facility: CLINIC | Age: 72
End: 2022-08-30

## 2022-09-06 ENCOUNTER — ANCILLARY PROCEDURE (OUTPATIENT)
Dept: MAMMOGRAPHY | Facility: CLINIC | Age: 72
End: 2022-09-06
Payer: COMMERCIAL

## 2022-09-06 DIAGNOSIS — Z12.31 VISIT FOR SCREENING MAMMOGRAM: ICD-10-CM

## 2022-09-06 PROCEDURE — 77067 SCR MAMMO BI INCL CAD: CPT | Mod: GC | Performed by: RADIOLOGY

## 2022-09-06 PROCEDURE — 77063 BREAST TOMOSYNTHESIS BI: CPT | Mod: GC | Performed by: RADIOLOGY

## 2022-09-17 DIAGNOSIS — E03.9 ACQUIRED HYPOTHYROIDISM: ICD-10-CM

## 2022-09-18 NOTE — TELEPHONE ENCOUNTER
"Routing refill request to provider for review/approval because:  Labs out of range:  TSH  Early fill requested    Last Written Prescription Date:  8/21/22  Last Fill Quantity: 30,  # refills: 1   Last office visit provider:  5/31/22     Requested Prescriptions   Pending Prescriptions Disp Refills     levothyroxine (SYNTHROID/LEVOTHROID) 137 MCG tablet [Pharmacy Med Name: LEVOTHYROXINE   Tablet] 30 tablet 1     Sig: TAKE 1 TABLET (137 MCG TOTAL) BY MOUTH DAILY.       Thyroid Protocol Failed - 9/17/2022  8:28 AM        Failed - Normal TSH on file in past 12 months     Recent Labs   Lab Test 05/31/22  1402   TSH 0.04*              Passed - Patient is 12 years or older        Passed - Recent (12 mo) or future (30 days) visit within the authorizing provider's specialty     Patient has had an office visit with the authorizing provider or a provider within the authorizing providers department within the previous 12 mos or has a future within next 30 days. See \"Patient Info\" tab in inbasket, or \"Choose Columns\" in Meds & Orders section of the refill encounter.              Passed - Medication is active on med list        Passed - No active pregnancy on record     If patient is pregnant or has had a positive pregnancy test, please check TSH.          Passed - No positive pregnancy test in past 12 months     If patient is pregnant or has had a positive pregnancy test, please check TSH.               Vivian Miller RN 09/18/22 6:41 AM  "

## 2022-09-19 RX ORDER — LEVOTHYROXINE SODIUM 137 UG/1
TABLET ORAL
Qty: 90 TABLET | Refills: 0 | Status: SHIPPED | OUTPATIENT
Start: 2022-09-19 | End: 2023-01-12

## 2022-10-04 ENCOUNTER — TRANSFERRED RECORDS (OUTPATIENT)
Dept: HEALTH INFORMATION MANAGEMENT | Facility: CLINIC | Age: 72
End: 2022-10-04

## 2022-10-22 ENCOUNTER — HEALTH MAINTENANCE LETTER (OUTPATIENT)
Age: 72
End: 2022-10-22

## 2022-10-27 ENCOUNTER — MYC MEDICAL ADVICE (OUTPATIENT)
Dept: FAMILY MEDICINE | Facility: CLINIC | Age: 72
End: 2022-10-27

## 2022-10-27 DIAGNOSIS — A09 TRAVELER'S DIARRHEA: Primary | ICD-10-CM

## 2022-10-27 RX ORDER — AZITHROMYCIN 500 MG/1
TABLET, FILM COATED ORAL
Qty: 3 TABLET | Refills: 0 | Status: SHIPPED | OUTPATIENT
Start: 2022-10-27 | End: 2022-11-18

## 2022-10-28 DIAGNOSIS — F40.243 FLYING PHOBIA: ICD-10-CM

## 2022-10-28 RX ORDER — LORAZEPAM 0.5 MG/1
TABLET ORAL
Qty: 30 TABLET | Refills: 0 | Status: SHIPPED | OUTPATIENT
Start: 2022-10-28 | End: 2023-06-20

## 2022-11-18 ENCOUNTER — E-VISIT (OUTPATIENT)
Dept: FAMILY MEDICINE | Facility: CLINIC | Age: 72
End: 2022-11-18
Payer: COMMERCIAL

## 2022-11-18 DIAGNOSIS — J01.90 ACUTE BACTERIAL SINUSITIS: Primary | ICD-10-CM

## 2022-11-18 DIAGNOSIS — B96.89 ACUTE BACTERIAL SINUSITIS: Primary | ICD-10-CM

## 2022-11-18 PROCEDURE — 99421 OL DIG E/M SVC 5-10 MIN: CPT | Performed by: FAMILY MEDICINE

## 2022-11-18 NOTE — TELEPHONE ENCOUNTER
Provider E-Visit time total (minute: 8)    Are you pregnant? (I am pregnant, I am confident that I am not pregnant, I think I may be pregnant) I am confident that I am not pregnant   When did your symptoms first start?  7-9 days ago   Do you have a fever?  No, I do not have a fever   Do you have any of the following (choose all that apply)?  Cough    Sore Throat    Runny Nose (Congestion)    Headache    Feeling very run down    Pain or pressure in your face   How often do you cough? Occasionally   Is your cough producing mucus (phlegm)? Yes, green or yellow mucus   Is your cough worse at night? No   What color is your congestion (mucus)? White   How Severe is your headache on a scale of 1-10 with 0 being no pain and 10 being the worst pain you can imagine?  4-6 (Medium Pain Level)   Please describe what you experienced. headache in forehead   Have you had similar symptoms in the past? (Yes I have these symptoms frequently, Yes I have had similar symptoms more than once before, Yes I have had similar symptoms before, No I have never had  these symptoms) Yes, I have had similar symptoms more than once before   Describe when you last had symptoms and what treatments have helped or not helped you in the past. really bad about a year ago. acupuncture cured it. I did that yesterday and the pain has returned. I am taking multiple supplements: vitamin D selenium and other things   Do you usually get a yeast infection if prescribed antibiotics? I'm not sure   In the 14 days before your symptoms started, did you have close contact with someone who had confirmed COVID-19 (Coronavirus)? No   Have you been exposed to someone with influenza ('the flu') or strep throat?  No   Are you a healthcare worker, , or frequently enter a healthcare facility for volunteering or work? No   Do you live with a Healthcare Worker? No   Resident or works in a congregate care/living setting? No   Is there any additional information  you would like the provider to know?  I have tested for covid 3 x in the past week and it was negataive     Mychart-Sore Throat Further Symptoms    Question 11/18/2022 12:27 PM CST - Filed by Patient   How Severe is your pain on a scale of 1-10 with 0 being no pain and 10 being the worst pain you can imagine?  1-3 (Mild Pain Level)   Are there white spots in the back of your throat?  No   Are you having difficulty swallowing?  I can swallow normally or with mild pain     Adalgisa was seen today for sinus problem.    Diagnoses and all orders for this visit:    Acute bacterial sinusitis  -     amoxicillin-clavulanate (AUGMENTIN) 875-125 MG tablet; Take 1 tablet by mouth 2 times daily for 7 days    I think it is reasonable to start on antibiotics.  You could give it another couple of days and try nasal saline spray/ warm packs on sinuses / steam and see if you can get things to drain and open up.  If not , start on the Augmentin I have prescribed and follow-up if not improving.    Melissa Walsh MD

## 2022-11-18 NOTE — PATIENT INSTRUCTIONS
Sinusitis (Antibiotic Treatment)    The sinuses are air-filled spaces within the bones of the face. They connect to the inside of the nose. Sinusitis is an inflammation of the tissue that lines the sinuses. Sinusitis can occur during a cold. It can also happen due to allergies to pollens and other particles in the air. Sinusitis can cause symptoms of sinus congestion and a feeling of fullness. A sinus infection causes fever, headache, and facial pain. There is often green or yellow fluid draining from the nose or into the back of the throat (post-nasal drip). You have been given antibiotics to treat this condition.   Home care    Take the full course of antibiotics as instructed. Don't stop taking them, even when you feel better.    Drink plenty of water, hot tea, and other liquids as directed by the healthcare provider. This may help thin nasal mucus. It also may help your sinuses drain fluids.    Heat may help soothe painful areas of your face. Use a towel soaked in hot water. Or,  the shower and direct the warm spray onto your face. Using a vaporizer along with a menthol rub at night may also help soothe symptoms.     An expectorant with guaifenesin may help thin nasal mucus and help your sinuses drain fluids. Talk with your provider or pharmacists before taking an over-the-counter (OTC) medicine if you have any questions about it or its side effects..    You can use an OTC decongestant, unless a similar medicine was prescribed to you. Nasal sprays work the fastest. Use one that contains phenylephrine or oxymetazoline. First blow your nose gently. Then use the spray. Don't use these medicines more often than directed on the label. If you do, your symptoms may get worse. You may also take pills that contain pseudoephedrine. Don t use products that combine multiple medicines. This is because side effects may be increased. Read labels. You can also ask the pharmacist for help. (People with high blood  pressure should not use decongestants. They can raise blood pressure.) Talk with your provider or pharmacist if you have any questions about the medicine..    OTC antihistamines may help if allergies contributed to your sinusitis. Talk with your provider or pharmacist if you have any questions about the medicine..    Don't use nasal rinses or irrigation during an acute sinus infection, unless your healthcare provider tells you to. Rinsing may spread the infection to other areas in your sinuses.    Use acetaminophen or ibuprofen to control pain, unless another pain medicine was prescribed to you. If you have chronic liver or kidney disease or ever had a stomach ulcer, talk with your healthcare provider before using these medicines. Never give aspirin to anyone under age 18 who is ill with a fever. It may cause severe liver damage.    Don't smoke. This can make symptoms worse.    Follow-up care  Follow up with your healthcare provider, or as advised.   When to seek medical advice  Call your healthcare provider if any of these occur:     Facial pain or headache that gets worse    Stiff neck    Unusual drowsiness or confusion    Swelling of your forehead or eyelids    Symptoms don't go away in 10 days    Vision problems, such as blurred or double vision    Fever of 100.4 F (38 C) or higher, or as directed by your healthcare provider  Call 911  Call 911 if any of these occur:     Seizure    Trouble breathing    Feeling dizzy or faint    Fingernails, skin or lips look blue, purple , or gray  Prevention  Here are steps you can take to help prevent an infection:     Keep good hand washing habits.    Don t have close contact with people who have sore throats, colds, or other upper respiratory infections.    Don t smoke, and stay away from secondhand smoke.    Stay up to date with of your vaccines.  [employer] last reviewed this educational content on 12/1/2019 2000-2021 [employer]. All rights reserved. This  information is not intended as a substitute for professional medical care. Always follow your healthcare professional's instructions.        Dear Adalgisa Dennis    After reviewing your responses, I've been able to diagnose you with Acute bacterial sinusitis.      Based on your responses and diagnosis, I have prescribed   Orders Placed This Encounter     amoxicillin-clavulanate (AUGMENTIN) 875-125 MG tablet    to treat your symptoms. I have sent this to your pharmacy.?     It is also important to stay well hydrated, get lots of rest and take over-the-counter decongestants,?tylenol?or ibuprofen if you?are able to?take those medications per your primary care provider to help relieve discomfort.?     It is important that you take?all of?your prescribed medication even if your symptoms are improving after a few doses.? Taking?all of?your medicine helps prevent the symptoms from returning.?     If your symptoms worsen, you develop severe headache, vomiting, high fever (>102), or are not improving in 7 days, please contact your primary care provider for an appointment or visit any of our convenient Walk-in Care or Urgent Care Centers to be seen which can be found on our website?here.?     Thanks again for choosing?us?as your health care partner,?   ?  Melissa Walsh MD?

## 2022-12-03 DIAGNOSIS — E03.9 ACQUIRED HYPOTHYROIDISM: ICD-10-CM

## 2022-12-05 RX ORDER — LIOTHYRONINE SODIUM 5 UG/1
TABLET ORAL
Qty: 60 TABLET | Refills: 5 | Status: SHIPPED | OUTPATIENT
Start: 2022-12-05 | End: 2023-06-19

## 2022-12-05 NOTE — TELEPHONE ENCOUNTER
"Routing refill request to provider for review/approval because:  Labs out of range:  TSH 0.04 (05/31/2022)    Last Written Prescription Date:  07/01/2022  Last Fill Quantity: 60,  # refills: 5   Last office visit provider:  05/31/2022     Requested Prescriptions   Pending Prescriptions Disp Refills     liothyronine (CYTOMEL) 5 MCG tablet [Pharmacy Med Name: LIOTHYRONINE SODIUM 5 MCG T 5 Tablet] 60 tablet 5     Sig: TAKE 2 TABLETS (10 MCG) BY MOUTH DAILY ON AN EMPTY STOMACH       Thyroid Protocol Failed - 12/3/2022  8:16 AM        Failed - Normal TSH on file in past 12 months     Recent Labs   Lab Test 05/31/22  1402   TSH 0.04*              Passed - Patient is 12 years or older        Passed - Recent (12 mo) or future (30 days) visit within the authorizing provider's specialty     Patient has had an office visit with the authorizing provider or a provider within the authorizing providers department within the previous 12 mos or has a future within next 30 days. See \"Patient Info\" tab in inbasket, or \"Choose Columns\" in Meds & Orders section of the refill encounter.              Passed - Medication is active on med list        Passed - No active pregnancy on record     If patient is pregnant or has had a positive pregnancy test, please check TSH.          Passed - No positive pregnancy test in past 12 months     If patient is pregnant or has had a positive pregnancy test, please check TSH.               Rose Howe RN 12/05/22 1:11 PM  "

## 2022-12-27 ENCOUNTER — TRANSFERRED RECORDS (OUTPATIENT)
Dept: HEALTH INFORMATION MANAGEMENT | Facility: CLINIC | Age: 72
End: 2022-12-27

## 2023-01-10 ENCOUNTER — MYC MEDICAL ADVICE (OUTPATIENT)
Dept: FAMILY MEDICINE | Facility: CLINIC | Age: 73
End: 2023-01-10

## 2023-01-10 DIAGNOSIS — E03.9 ACQUIRED HYPOTHYROIDISM: ICD-10-CM

## 2023-01-10 DIAGNOSIS — R41.840 CONCENTRATION DEFICIT: Primary | ICD-10-CM

## 2023-01-12 RX ORDER — LEVOTHYROXINE SODIUM 137 UG/1
137 TABLET ORAL DAILY
Qty: 90 TABLET | Refills: 1 | Status: SHIPPED | OUTPATIENT
Start: 2023-01-12 | End: 2023-06-20

## 2023-01-12 NOTE — TELEPHONE ENCOUNTER
"Routing refill request to provider for review/approval because:  Labs out of range:  TSH    Last Written Prescription Date:  9/19/22  Last Fill Quantity: 90,  # refills: 0   Last office visit provider:  5/31/22     Requested Prescriptions   Pending Prescriptions Disp Refills     levothyroxine (SYNTHROID/LEVOTHROID) 137 MCG tablet [Pharmacy Med Name: LEVOTHYROXINE   Tablet] 90 tablet 11     Sig: TAKE 1 TABLET (137 MCG TOTAL) BY MOUTH DAILY.       Thyroid Protocol Failed - 1/12/2023  7:47 AM        Failed - Normal TSH on file in past 12 months     Recent Labs   Lab Test 05/31/22  1402   TSH 0.04*              Passed - Patient is 12 years or older        Passed - Recent (12 mo) or future (30 days) visit within the authorizing provider's specialty     Patient has had an office visit with the authorizing provider or a provider within the authorizing providers department within the previous 12 mos or has a future within next 30 days. See \"Patient Info\" tab in inbasket, or \"Choose Columns\" in Meds & Orders section of the refill encounter.              Passed - Medication is active on med list        Passed - No active pregnancy on record     If patient is pregnant or has had a positive pregnancy test, please check TSH.          Passed - No positive pregnancy test in past 12 months     If patient is pregnant or has had a positive pregnancy test, please check TSH.               Woo Lord RN 01/12/23 7:47 AM  "

## 2023-03-08 ENCOUNTER — TELEPHONE (OUTPATIENT)
Dept: FAMILY MEDICINE | Facility: CLINIC | Age: 73
End: 2023-03-08
Payer: COMMERCIAL

## 2023-03-17 NOTE — TELEPHONE ENCOUNTER
Patient sent CRM message stating [facility] has not received form. Form reprinted and placed in Dr. Walsh's mailbox.

## 2023-03-17 NOTE — TELEPHONE ENCOUNTER
Pt's informed. A copy send via e-mail to pt and original send to Medical record to scan in chart. xl

## 2023-03-27 ENCOUNTER — TRANSFERRED RECORDS (OUTPATIENT)
Dept: HEALTH INFORMATION MANAGEMENT | Facility: CLINIC | Age: 73
End: 2023-03-27

## 2023-04-17 DIAGNOSIS — E03.9 ACQUIRED HYPOTHYROIDISM: ICD-10-CM

## 2023-04-17 RX ORDER — LEVOTHYROXINE SODIUM 137 UG/1
137 TABLET ORAL DAILY
Qty: 90 TABLET | Refills: 1 | OUTPATIENT
Start: 2023-04-17

## 2023-04-17 NOTE — TELEPHONE ENCOUNTER
"Last Written Prescription Date:  1/12/23  Last Fill Quantity: 90,  # refills: 1   Last office visit provider:  5/31/22     Requested Prescriptions   Pending Prescriptions Disp Refills     levothyroxine (SYNTHROID/LEVOTHROID) 137 MCG tablet [Pharmacy Med Name: LEVOTHYROXINE   Tablet] 90 tablet 1     Sig: TAKE 1 TABLET (137 MCG) BY MOUTH DAILY 30 MINUTES PRIOR TO BREAKFAST.       Thyroid Protocol Failed - 4/17/2023 10:16 AM        Failed - Normal TSH on file in past 12 months     Recent Labs   Lab Test 05/31/22  1402   TSH 0.04*              Passed - Patient is 12 years or older        Passed - Recent (12 mo) or future (30 days) visit within the authorizing provider's specialty     Patient has had an office visit with the authorizing provider or a provider within the authorizing providers department within the previous 12 mos or has a future within next 30 days. See \"Patient Info\" tab in inbasket, or \"Choose Columns\" in Meds & Orders section of the refill encounter.              Passed - Medication is active on med list        Passed - No active pregnancy on record     If patient is pregnant or has had a positive pregnancy test, please check TSH.          Passed - No positive pregnancy test in past 12 months     If patient is pregnant or has had a positive pregnancy test, please check TSH.               ELIE IZAGUIRRE RN 04/17/23 1:49 PM  "

## 2023-04-26 ENCOUNTER — TELEPHONE (OUTPATIENT)
Dept: FAMILY MEDICINE | Facility: CLINIC | Age: 73
End: 2023-04-26
Payer: COMMERCIAL

## 2023-04-26 DIAGNOSIS — I48.0 PAROXYSMAL ATRIAL FIBRILLATION (H): ICD-10-CM

## 2023-04-26 NOTE — TELEPHONE ENCOUNTER
General Call    Contacts       Type Contact Phone/Fax    04/26/2023 04:40 PM CDT Phone (Incoming) Sonny Adalgisa A (Self) [phone] (M)        Reason for Call:  Refill/Pharmacy Change    What are your questions or concerns:  Patient is changing pharmacies for Eliquis to mail order and [facility] cannot transfer remainder of current prescription. Patient has appointment scheduled for an annual physical 8/18/2023. Patient is requesting new prescription for 90 day dispenses be sent to bridge until upcoming appointment.    Date of last appointment with provider: 5/31/2023    Could we send this information to you in [facility] or would you prefer to receive a phone call?:   Patient would prefer a phone call   Okay to leave a detailed message?: Yes at Cell number on file:    Telephone Information:   Mobile [phone]

## 2023-04-27 RX ORDER — APIXABAN 5 MG/1
5 TABLET, FILM COATED ORAL 2 TIMES DAILY
Qty: 180 TABLET | Refills: 1 | Status: SHIPPED | OUTPATIENT
Start: 2023-04-27 | End: 2023-05-04

## 2023-05-17 ENCOUNTER — TRANSFERRED RECORDS (OUTPATIENT)
Dept: HEALTH INFORMATION MANAGEMENT | Facility: CLINIC | Age: 73
End: 2023-05-17
Payer: COMMERCIAL

## 2023-05-27 ENCOUNTER — TRANSFERRED RECORDS (OUTPATIENT)
Dept: HEALTH INFORMATION MANAGEMENT | Facility: CLINIC | Age: 73
End: 2023-05-27
Payer: COMMERCIAL

## 2023-06-12 ENCOUNTER — VIRTUAL VISIT (OUTPATIENT)
Dept: PSYCHOLOGY | Facility: CLINIC | Age: 73
End: 2023-06-12
Attending: FAMILY MEDICINE
Payer: COMMERCIAL

## 2023-06-12 DIAGNOSIS — R41.840 CONCENTRATION DEFICIT: ICD-10-CM

## 2023-06-12 DIAGNOSIS — F34.1 DYSTHYMIA: Primary | ICD-10-CM

## 2023-06-12 PROCEDURE — 90791 PSYCH DIAGNOSTIC EVALUATION: CPT | Mod: VID | Performed by: PSYCHOLOGIST

## 2023-06-12 ASSESSMENT — PATIENT HEALTH QUESTIONNAIRE - PHQ9
SUM OF ALL RESPONSES TO PHQ QUESTIONS 1-9: 5
10. IF YOU CHECKED OFF ANY PROBLEMS, HOW DIFFICULT HAVE THESE PROBLEMS MADE IT FOR YOU TO DO YOUR WORK, TAKE CARE OF THINGS AT HOME, OR GET ALONG WITH OTHER PEOPLE: SOMEWHAT DIFFICULT
SUM OF ALL RESPONSES TO PHQ QUESTIONS 1-9: 5
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
## 2023-06-18 DIAGNOSIS — E03.9 ACQUIRED HYPOTHYROIDISM: ICD-10-CM

## 2023-06-18 NOTE — TELEPHONE ENCOUNTER
"Routing refill request to provider for review/approval because:  Labs not current:  TSH    Last Written Prescription Date:  12/5/22  Last Fill Quantity: 60,  # refills: 5   Last office visit provider:  5/31/22     Requested Prescriptions   Pending Prescriptions Disp Refills     liothyronine (CYTOMEL) 5 MCG tablet [Pharmacy Med Name: liothyronine 5 mcg tablet] 60 tablet 5     Sig: TAKE 2 TABLETS (10 MCG) BY MOUTH DAILY ON AN EMPTY STOMACH       Thyroid Protocol Failed - 6/18/2023  8:03 AM        Failed - Normal TSH on file in past 12 months     Recent Labs   Lab Test 05/31/22  1402   TSH 0.04*              Passed - Patient is 12 years or older        Passed - Recent (12 mo) or future (30 days) visit within the authorizing provider's specialty     Patient has had an office visit with the authorizing provider or a provider within the authorizing providers department within the previous 12 mos or has a future within next 30 days. See \"Patient Info\" tab in inbasket, or \"Choose Columns\" in Meds & Orders section of the refill encounter.              Passed - Medication is active on med list        Passed - No active pregnancy on record     If patient is pregnant or has had a positive pregnancy test, please check TSH.          Passed - No positive pregnancy test in past 12 months     If patient is pregnant or has had a positive pregnancy test, please check TSH.               Yesi Cuello 06/18/23 10:20 AM  "

## 2023-06-19 RX ORDER — LIOTHYRONINE SODIUM 5 UG/1
TABLET ORAL
Qty: 60 TABLET | Refills: 5 | Status: SHIPPED | OUTPATIENT
Start: 2023-06-19 | End: 2023-12-22

## 2023-07-03 ENCOUNTER — TRANSFERRED RECORDS (OUTPATIENT)
Dept: HEALTH INFORMATION MANAGEMENT | Facility: CLINIC | Age: 73
End: 2023-07-03
Payer: COMMERCIAL

## 2023-07-10 ENCOUNTER — VIRTUAL VISIT (OUTPATIENT)
Dept: PSYCHOLOGY | Facility: CLINIC | Age: 73
End: 2023-07-10
Payer: COMMERCIAL

## 2023-07-10 DIAGNOSIS — F34.1 DYSTHYMIA: ICD-10-CM

## 2023-07-10 DIAGNOSIS — F90.2 ADHD (ATTENTION DEFICIT HYPERACTIVITY DISORDER), COMBINED TYPE: Primary | ICD-10-CM

## 2023-07-10 PROCEDURE — 96130 PSYCL TST EVAL PHYS/QHP 1ST: CPT | Mod: VID | Performed by: PSYCHOLOGIST

## 2023-07-10 PROCEDURE — 96131 PSYCL TST EVAL PHYS/QHP EA: CPT | Mod: VID | Performed by: PSYCHOLOGIST

## 2023-07-10 NOTE — PROGRESS NOTES
[facility]      PATIENT'S NAME: Adalgisa Dennis    MRN: [id number]    ACCT. NUMBER:  [id number]    DATE OF SERVICE: 7/10/23    SERVICE MODALITY:  Video Visit:      Provider verified identity through the following two step process.  Patient provided:  Patient photo and Patient     Telemedicine Visit: The patient's condition can be safely assessed and treated via synchronous audio and visual telemedicine encounter.      Reason for Telemedicine Visit: Patient has requested telehealth visit    Originating Site (Patient Location): Patient's home    Distant Site (Provider Location): Provider Remote Setting- Home Office    Consent:  The patient/guardian has verbally consented to: the potential risks and benefits of telemedicine (video visit) versus in person care; bill my insurance or make self-payment for services provided; and responsibility for payment of non-covered services.     Patient would like the video invitation sent by:  My Chart    Mode of Communication:  Video Conference via [employer]    Distant Location (Provider):  Off-site    As the provider I attest to compliance with applicable laws and regulations related to telemedicine.    Date(s) of assessment: 23;23; 23      Information about appointment:  Patient attended two  sessions to aid in determining patient's mental health diagnosis or diagnoses and treatment recommendations that best address patient concerns. Patient records includingmedical were reviewed. A diagnostic assessment was conducted at the initial appointment. Patient completed several rating scales to assist in assessing attention-related and other mental health symptoms that may be causing impairments in functioning. Rating scales were also completed by a collateral contact.    Assessment tools:   Some measures may have been completed remotely due to virtual care, in which case observation of task completion was not possible.    Jaylin Adult ADHD Rating  "Scale-IV: Self and Other Reports (BAARS-IV), Jaylin Functional Impairment Scale: Self and Other Reports (BFIS), Jaylin Deficits in Executive Functioning Scale: Self and Other Reports (BDEFS), Patient Health Questionnaire-9 (PHQ-9), Generalized Anxiety Disorder-7 (KARINA-7), [place] Multiphasic Personality Inventory (MMPI) and CNS Vital Signs Neurocognitive Battery      Assessment Results:    Jaylin Adult ADHD Rating Scale-IV: Self and Other Reports (BAARS-IV)  The BAARS-IV assesses for symptoms of ADHD that are experienced in one's daily life. This assessment measure includes self and collateral rating scales designed to provide information regarding current and childhood symptoms of ADHD including inattention, hyperactivity, and impulsivity. Self-report scores are reported as percentiles. Scores at the 76th-83rd percentile are considered marginal, scores at the 84th-92nd percentile are considered borderline, scores at the 93rd-95th percentile are considered mild, scores at the 96th-98th percentile are considered moderate, and those at the 99th percentile are considered severe. Collateral or \"other\" rating scales are reported as number of symptoms observed in comparison to those reported by the patient. Norms and percentile scores are not available for collateral reports.     Current Symptoms Scale--Self Report:   Patient completed the self-report inventory of current symptoms. The results indicate that the patient's Total ADHD Score was 39 which places her in the 99th percentile for overall ADHD symptoms. In addition, the patient endorsed 9/9 (99th percentile) Inattention symptoms, 1/5 (92nd percentile) Hyperactivity symptoms, 4/4 (96th percentile) Impulsivity symptoms, and 8/9 (96th percentile) Sluggish Cognitive Tempo symptoms. Patient indicated that the reported symptoms have resulted in impaired functioning in home, work and social relationships. Overall, the results suggest the patient is expeiencing Severe " "ADHD symptoms.     Childhood Symptoms Scale--Self-Report:  Patient completed the self-report inventory of childhood symptoms. The results indicate that the patient's Total ADHD Score was 24 which places her in the 75th percentile for overall ADHD symptoms in childhood. In addition, the patient endorsed 4/9 (75th percentile) Inattention symptoms and  4/9 (76th percentile) Hyperactivity-Impulsivity symptoms. Patient indicated that the reported symptoms resulted in impaired functioning in social relationships Overall, the results suggest the patient experienced  no clinically significant symptoms of ADHD as a child.     Jaylin Functional Impairment Scale: Self and Other Reports (BFIS)  The BFIS is used to assess the level of impairment in major life/daily activities that may be due to mental health symptoms. This assessment measure includes self and collateral rating scales. Self-report scores are reported as percentiles. Scores at the 76th-83rd percentile are considered marginal, scores at the 84th-92nd percentile are considered borderline, scores at the 93rd-95th percentile are considered mild, scores at the 96th-98th percentile are considered moderate, and those at the 99th percentile are considered severe.Collateral or \"other\" rating scales are reported as number of symptoms observed in comparison to those reported by the patient. Norms and percentile scores are not available for collateral reports.     Results indicate the patient identified impairment (scores at or greater than 93rd percentile) in the following areas: social-friends The patient's Mean Impairment Score was 2.4 (84th percentile) indicating the patient is reporting Borderline impairment in functioning across domains.     Jaylin Deficits in Executive Functioning Scale (BDEFS)  The BDEFS is a measure used for evaluating adult executive functioning in daily activities.This assessment measure includes self and collateral rating scales. Self-report " "scores are reported as percentiles. Scores at the 76th-83rd percentile are considered marginal, scores at the 84th-92nd percentile are considered borderline, scores at the 93rd-95th percentile are considered mild, scores at the 96th-98th percentile are considered moderate, and those at the 99th percentile are considered severe.Collateral or \"other\" rating scales are reported as number of symptoms observed in comparison to those reported by the patient. Norms and percentile scores are not available for collateral reports.     Results indicate the patient's Total Executive Functioning Score was 175  (96th percentile). The ADHD-Executive Functioning Index score was 21 (93rdpercentile). These scores suggest the patient has Moderate deficits in executive functioning. These deficits are likely to be due to ADHD. Results indicate the patient identified significant deficits in the following areas: self-management to time 99th percentile , self-motivation 98th percentile and self-regulation of emotions 96th percentil.       CNS Vital Signs Neurocognitive Battery  The CNS Vital Signs Neurocognitive Battery is a remotely-administered assessment comprised of seven core subtests to individually measure the patient's verbal memory, visual memory, motor speed, psychomotor speed, reaction time, focus, ability to sustain attention and ability to adapt to changing rules and tasks.      Above average domain scores indicate a standard score (SS) greater than 109 or a Percentile Rank (TN) greater than 74, indicating a high functioning test subject. Average is a SS  or TN 25-74, indicating normal function. Low Average is a SS 80-89 or TN 9-24 indicating a slight deficit or impairment. Below Average is a SS 70-79 or TN 2-8, indicating a moderate level of deficit or impairment. Very Low is a SS less than 70 or a TN less than 2, indicating a deficit and impairment.  Validity Indicator denotes a guideline for representing the " possibility of an invalid test or domain score, and can be influenced by patient understanding, effort, or other conditions.      Neurocognitive Index (NCI): Measures an average score derived from the domain scores or a general assessment of the overall neurocognitive status of the patient. The patient's NCI score is 109, with a percentile of 73, and falls within the average range.    Composite Memory: Measures how well subject can recognize, remember, and retrieve words and geometric figures, and is comprised of the Visual and Verbal Memory domains. The patient's Composite Memory score is 100, with a percentile of 50, and falls within the average range.    Verbal Memory: Measures how well subject can recognize, remember, and retrieve words. The patient's Verbal Memory score is 115, with a percentile of 84, and falls within the above average range.    Visual Memory: Measures how well subject can recognize, remember and retrieve geometric figures. The patient's Visual Memory score is 83, with a percentile of 18, and falls within the low average range.    Psychomotor Speed: Measures how well a subject perceives, attends, responds to complex visual-perceptual information and performs simple fine motor coordination, and is comprised of the Motor Speed and Processing Speed indexes. The patient's Psychomotor Speed score is 122, with a percentile of 93, and falls within the above average range.    Reaction Time: Measures how quickly the subject can react, in milliseconds, to a simple and increasingly complex direction set. The patient's Reaction Time score is 97, with a percentile of 42, and falls within the average range.    Complex Attention: Measures the ability to track and respond to a variety of stimuli over lengthy periods of time and/or perform complex mental tasks requiring vigilance quickly and accurately. The patient's Complex Attention score is 114, with a percentile of 82, and falls within the above average  range.    Cognitive Flexibility: Measures how well subject is able to adapt to rapidly changing and increasingly complex set of directions and/or to manipulate the information. The patient's Cognitive Flexibility score is 112, with a percentile of 79, and falls within the above average range.    Processing Speed: Measures how well a subject recognizes and processes information i.e., perceiving, attending/responding to incoming information, motor speed, fine motor coordination, and visual-perceptual ability. The patient's Processing Speed score is 113, with a percentile of 81, and falls within the above average range.    Executive Function: Measures how well a subject recognizes rules, categories, and manages or navigates rapid decision making. The patient's Executive Function score is 111, with a percentile of 77, and falls within the above average range.    Simple Attention: Measures the ability to track and respond to a single defined stimulus over lengthy periods of time while performing vigilance and response inhibition quickly and accurately to a simple task. The patient's Simple Attention score is 101, with a percentile of 53, and falls within the average range.    Motor Speed: Measure: Ability to perform simple movements to produce and satisfy an intention towards a manual action and goal. The patient's Motor Speed score is 120, with a percentile of 91, and falls within the above average range.      [place] Multiphasic Personality Inventory (MMPI)   The patient responded in a consistent and forthright manner. The profile is considered valid.     Somatic/Cognitive Function: The patient reported a general sense of physical well-being with some difficulties with cognitive function to include memory and attention.     Emotional Function: The patient's profile is indicative of someone experiencing above average levels of stress and anxiety that she may find difficult to manage at times. Persons with similar  profiles often report excessive worry, rumination and, at times, preoccupation with disappointments.     Thought Function: There is no indication of problems with thinking or perceptual disturbance.     Behavioral Function:  The patient's profile indicates she possesses adequate behavioral constraint and is unlikely to utilize external behaviors when managing stress.     Interpersonal Function: The patient's profile indicates that she is generally comfortable with, and received well, when engaging others.     Generalized Anxiety Disorder Questionnaire (KARINA-7)  This self-report questionnaire is designed to assess for anxiety in adults. Patient s score of 5  indicates that she is experiencing mild symptoms of anxiety.      Patient Health Questionnaire- 9 (PHQ-9)   This self-report questionnaire is designed to assess for depression in adults. Patient s score of 6 indicates that she is experiencing mild symptoms of depression.      Summary   Patient presented with concerns about disorganization and attention. She reported difficulty with communication, organization during her years of schooling, parenting and work (now retired), and current struggles with organization at home. She studied psychology in college and achieved a masters degree in creative writing. She recalled procrastinating often and completing assignments at the last minute. She also recalled needing to work a bit harder than peers due to having struggles with organizing assigned readings.    The Patient's test responses indicate she experiencing moderately severe symptoms of ADHD combined type. She currently appears to have struggles with inattention and impulsivity. Of note are moderate to severe issues with time management, motivation to complete tasks and emotion management. Cognitive test performance generally fell in the average to above average range. Such scores are not uncommon for persons with combined type ADHD. Given her academic achievement,  "it is likely the patient has above average intelligence which would also influence her performance.    When asked about her upbringing she described being reared in an environment that was quite structured. She stated she would be punished if she did not go along with the schedule and structure of her home. Given this she was able to \"over achieve\" in school, which she enjoyed. Upon going to college she found more difficulty with keeping up academically without the structure she had at home. The patient reported she has noticed her ability to organize and attend to tasks decrease over time. She reported difficulty keeping her home tidy and stated she often leaves things out where she can see them. Her, now adult, daughter commented that the patient has often appeared distracted. The patient recalled,  \"I would always do 2 or 3 activites at the same time, and I had difficult time with just sitting with her. I still have issues with sitting through conversations with others.\"      The patient has also experienced depression for much of her life. She has been treated for sometime and done a great deal of self-work which has made her symptoms more manageable.    Diagnosis:   ADHD, combined type  Dysthymia by hx      Recommendations:    Schedule an appointment with your physician to discuss a medication evaluation. and Consider working with an ADHD  or individual therapist to learn skills to  assist with symptom management, as well as ways to improve relationships,  etc that may have been impacted by your symptoms.     GHADA CAMPOS PsyD    Psychological Testing  Sample Billing/Services Summary       Testing Evaluation Services Base: 00548  (1st 60 mins) Add-on: 24690  (each addtl 60 mins)   Record Review and Clarify Referral Question   10/12/2023 10minutes               Integration/Report Generation   6/18/23; 300-345; 500-600  6/20/23; 400-500 327 minutes   Interactive Feedback Session  7/10/23 [id number] 15 " minutes   Post-Service Work   7/13/23; 7980-5828 10 minutes   Total Time: 200 minutes (3 hours, 20 minutes)   Total Units: 1 2                                 Diagnosis: ADHD, combined type; Dysthymia

## 2023-08-09 ENCOUNTER — NURSE TRIAGE (OUTPATIENT)
Dept: NURSING | Facility: CLINIC | Age: 73
End: 2023-08-09
Payer: COMMERCIAL

## 2023-08-09 ENCOUNTER — E-VISIT (OUTPATIENT)
Dept: FAMILY MEDICINE | Facility: CLINIC | Age: 73
End: 2023-08-09
Payer: COMMERCIAL

## 2023-08-09 DIAGNOSIS — J01.90 ACUTE SINUSITIS, RECURRENCE NOT SPECIFIED, UNSPECIFIED LOCATION: Primary | ICD-10-CM

## 2023-08-09 PROCEDURE — 99421 OL DIG E/M SVC 5-10 MIN: CPT | Performed by: FAMILY MEDICINE

## 2023-08-09 NOTE — TELEPHONE ENCOUNTER
"Nurse Triage SBAR    Is this a 2nd Level Triage? NO    Situation: Pt calling with sinus pain and cough.    Background: Started last Wednesday with a headache. Then progressed to fatigue and congestion. Pt had been in [place] the 3 weeks prior to that.    Assessment: Sayss she has an \"annoying\" cough and is coughing up thick balls of mucous. Denies fever or difficulty breathing. Has pain behind her forehead and eyes. Denies any redness or swelling.    Protocol Recommended Disposition:   See in office today or tomorrow.    Recommendation: Recommend pt can schedule an E-vist or virtual visit today. Explained the difference between the two and pt says she will submit and Evisit. Encouraged to call back if questions or worsening symptoms.    Alfreda Ojeda, RN, BSN  [facility]   Triage Nurse Advisor    Reason for Disposition   Lots of coughing    Additional Information   Negative: Sounds like a life-threatening emergency to the triager   Negative: Difficulty breathing, and not from stuffy nose (e.g., not relieved by cleaning out the nose)   Negative: SEVERE headache and has fever   Negative: Patient sounds very sick or weak to the triager   Negative: SEVERE sinus pain   Negative: Severe headache   Negative: Redness or swelling on the cheek, forehead, or around the eye   Negative: Fever > 103 F (39.4 C)   Negative: Fever > 101 F (38.3 C) and over 60 years of age   Negative: Fever > 100.0 F (37.8 C) and has diabetes mellitus or a weak immune system (e.g., HIV positive, cancer chemotherapy, organ transplant, splenectomy, chronic steroids)   Negative: Fever > 100.0 F (37.8 C) and bedridden (e.g., nursing home patient, stroke, chronic illness, recovering from surgery)   Negative: Fever present > 3 days (72 hours)   Negative: Fever returns after gone for over 24 hours and symptoms worse or not improved   Negative: Sinus pain (not just congestion) and fever   Negative: Earache   Negative: Sinus congestion (pressure, " fullness) present > 10 days   Negative: Nasal discharge present > 10 days   Negative: Using nasal washes and pain medicine > 24 hours and sinus pain (lower forehead, cheekbone, or eye) persists    Protocols used: Sinus Pain or Congestion-A-OH

## 2023-08-09 NOTE — PATIENT INSTRUCTIONS
Adalgisa,    I had a chance to review the symptoms that you noted in the e-visit questionnaire.  I also see that you had a similar episode last November after air travel.  Assuming that your symptoms are similar to last November (you describe more severe sinus symptoms at that time) it might be reasonable to think of this is a bacterial infection.  Usually, antibiotics are offered after a person has congestion of the sinuses for 10 or more days.  Based on your questionnaire you are about 7 days into your episode.  This means that there is a decent chance that this could be viral still at this time.  I did prescribe Augmentin.  I think you should wait a couple more days and see how your symptoms evolve.  Please let myself/Dr. Walsh know if you have any additional questions.    Donnie Kim MD

## 2023-08-17 ENCOUNTER — TELEPHONE (OUTPATIENT)
Dept: FAMILY MEDICINE | Facility: CLINIC | Age: 73
End: 2023-08-17
Payer: COMMERCIAL

## 2023-08-17 ASSESSMENT — ENCOUNTER SYMPTOMS
FEVER: 0
ARTHRALGIAS: 0
EYE PAIN: 1
SORE THROAT: 1
MYALGIAS: 0
SHORTNESS OF BREATH: 0
COUGH: 1
DIZZINESS: 1
WEAKNESS: 0
CONSTIPATION: 0
ABDOMINAL PAIN: 0
BREAST MASS: 0
HEADACHES: 1
HEMATOCHEZIA: 0
FREQUENCY: 0
NAUSEA: 0
HEMATURIA: 0
HEARTBURN: 0
DIARRHEA: 0
PALPITATIONS: 0
PARESTHESIAS: 0
JOINT SWELLING: 0
DYSURIA: 0
CHILLS: 1

## 2023-08-17 ASSESSMENT — PATIENT HEALTH QUESTIONNAIRE - PHQ9
SUM OF ALL RESPONSES TO PHQ QUESTIONS 1-9: 5
SUM OF ALL RESPONSES TO PHQ QUESTIONS 1-9: 5

## 2023-08-17 ASSESSMENT — ACTIVITIES OF DAILY LIVING (ADL): CURRENT_FUNCTION: NO ASSISTANCE NEEDED

## 2023-08-17 NOTE — TELEPHONE ENCOUNTER
Patient Quality Outreach    Patient is due for the following:   Physical Annual Wellness Visit    Next Steps:   Patient has upcoming appointment, these items will be addressed at that time.    NOTE: pt has AWV appointment scheduled on 8/18/23 with Dr. Walsh. xl  Type of outreach:    Chart review performed, no outreach needed.      Questions for provider review:    None           Debi Thapa

## 2023-08-18 ENCOUNTER — OFFICE VISIT (OUTPATIENT)
Dept: FAMILY MEDICINE | Facility: CLINIC | Age: 73
End: 2023-08-18
Payer: COMMERCIAL

## 2023-08-18 VITALS
SYSTOLIC BLOOD PRESSURE: 100 MMHG | RESPIRATION RATE: 14 BRPM | HEIGHT: 65 IN | WEIGHT: 147 LBS | BODY MASS INDEX: 24.49 KG/M2 | OXYGEN SATURATION: 97 % | HEART RATE: 65 BPM | DIASTOLIC BLOOD PRESSURE: 63 MMHG

## 2023-08-18 DIAGNOSIS — R06.83 SNORING: ICD-10-CM

## 2023-08-18 DIAGNOSIS — Z13.228 ENCOUNTER FOR SCREENING FOR OTHER METABOLIC DISORDERS: ICD-10-CM

## 2023-08-18 DIAGNOSIS — E03.9 ACQUIRED HYPOTHYROIDISM: ICD-10-CM

## 2023-08-18 DIAGNOSIS — Z78.0 POST-MENOPAUSAL: ICD-10-CM

## 2023-08-18 DIAGNOSIS — E55.9 VITAMIN D DEFICIENCY: ICD-10-CM

## 2023-08-18 DIAGNOSIS — Z00.00 HEALTH CARE MAINTENANCE: ICD-10-CM

## 2023-08-18 DIAGNOSIS — Z00.00 ENCOUNTER FOR MEDICARE ANNUAL WELLNESS EXAM: Primary | ICD-10-CM

## 2023-08-18 LAB
ALBUMIN SERPL BCG-MCNC: 4.7 G/DL (ref 3.5–5.2)
ALP SERPL-CCNC: 71 U/L (ref 35–104)
ALT SERPL W P-5'-P-CCNC: 17 U/L (ref 0–50)
ANION GAP SERPL CALCULATED.3IONS-SCNC: 12 MMOL/L (ref 7–15)
AST SERPL W P-5'-P-CCNC: 27 U/L (ref 0–45)
BILIRUB SERPL-MCNC: 0.3 MG/DL
BUN SERPL-MCNC: 20.5 MG/DL (ref 8–23)
CALCIUM SERPL-MCNC: 8.9 MG/DL (ref 8.8–10.2)
CHLORIDE SERPL-SCNC: 100 MMOL/L (ref 98–107)
CHOLEST SERPL-MCNC: 195 MG/DL
CREAT SERPL-MCNC: 0.57 MG/DL (ref 0.51–0.95)
DEPRECATED HCO3 PLAS-SCNC: 24 MMOL/L (ref 22–29)
ERYTHROCYTE [DISTWIDTH] IN BLOOD BY AUTOMATED COUNT: 13.3 % (ref 10–15)
GFR SERPL CREATININE-BSD FRML MDRD: >90 ML/MIN/1.73M2
GLUCOSE SERPL-MCNC: 101 MG/DL (ref 70–99)
HBA1C MFR BLD: 5.7 % (ref 0–5.6)
HCT VFR BLD AUTO: 41.7 % (ref 35–47)
HDLC SERPL-MCNC: 70 MG/DL
HGB BLD-MCNC: 13.9 G/DL (ref 11.7–15.7)
LDLC SERPL CALC-MCNC: 118 MG/DL
MCH RBC QN AUTO: 30.4 PG (ref 26.5–33)
MCHC RBC AUTO-ENTMCNC: 33.3 G/DL (ref 31.5–36.5)
MCV RBC AUTO: 91 FL (ref 78–100)
NONHDLC SERPL-MCNC: 125 MG/DL
PLATELET # BLD AUTO: 162 10E3/UL (ref 150–450)
POTASSIUM SERPL-SCNC: 4.2 MMOL/L (ref 3.4–5.3)
PROT SERPL-MCNC: 6.9 G/DL (ref 6.4–8.3)
RBC # BLD AUTO: 4.57 10E6/UL (ref 3.8–5.2)
SODIUM SERPL-SCNC: 136 MMOL/L (ref 136–145)
T3FREE SERPL-MCNC: 3.2 PG/ML (ref 2–4.4)
T4 FREE SERPL-MCNC: 1.35 NG/DL (ref 0.9–1.7)
TRIGL SERPL-MCNC: 34 MG/DL
TSH SERPL DL<=0.005 MIU/L-ACNC: 0.66 UIU/ML (ref 0.3–4.2)
WBC # BLD AUTO: 9.1 10E3/UL (ref 4–11)

## 2023-08-18 PROCEDURE — 99213 OFFICE O/P EST LOW 20 MIN: CPT | Mod: 25 | Performed by: FAMILY MEDICINE

## 2023-08-18 PROCEDURE — 80053 COMPREHEN METABOLIC PANEL: CPT | Performed by: FAMILY MEDICINE

## 2023-08-18 PROCEDURE — G0439 PPPS, SUBSEQ VISIT: HCPCS | Performed by: FAMILY MEDICINE

## 2023-08-18 PROCEDURE — 85027 COMPLETE CBC AUTOMATED: CPT | Performed by: FAMILY MEDICINE

## 2023-08-18 PROCEDURE — 84439 ASSAY OF FREE THYROXINE: CPT | Performed by: FAMILY MEDICINE

## 2023-08-18 PROCEDURE — 84481 FREE ASSAY (FT-3): CPT | Performed by: FAMILY MEDICINE

## 2023-08-18 PROCEDURE — 84443 ASSAY THYROID STIM HORMONE: CPT | Performed by: FAMILY MEDICINE

## 2023-08-18 PROCEDURE — 36415 COLL VENOUS BLD VENIPUNCTURE: CPT | Performed by: FAMILY MEDICINE

## 2023-08-18 PROCEDURE — 82306 VITAMIN D 25 HYDROXY: CPT | Performed by: FAMILY MEDICINE

## 2023-08-18 PROCEDURE — 83036 HEMOGLOBIN GLYCOSYLATED A1C: CPT | Performed by: FAMILY MEDICINE

## 2023-08-18 PROCEDURE — 80061 LIPID PANEL: CPT | Performed by: FAMILY MEDICINE

## 2023-08-18 ASSESSMENT — ENCOUNTER SYMPTOMS
JOINT SWELLING: 0
EYE PAIN: 1
HEARTBURN: 0
DYSURIA: 0
CHILLS: 1
ARTHRALGIAS: 0
FEVER: 0
ABDOMINAL PAIN: 0
FREQUENCY: 0
COUGH: 1
DIZZINESS: 1
SHORTNESS OF BREATH: 0
HEMATOCHEZIA: 0
HEADACHES: 1
PARESTHESIAS: 0
WEAKNESS: 0
NAUSEA: 0
SORE THROAT: 1
CONSTIPATION: 0
PALPITATIONS: 0
MYALGIAS: 0
BREAST MASS: 0
DIARRHEA: 0
HEMATURIA: 0

## 2023-08-18 ASSESSMENT — ACTIVITIES OF DAILY LIVING (ADL): CURRENT_FUNCTION: NO ASSISTANCE NEEDED

## 2023-08-18 NOTE — PROGRESS NOTES
"SUBJECTIVE:   Adalgisa is a 73 year old who presents for Preventive Visit.      8/18/2023     8:02 AM   Additional Questions   Roomed by Wu Hurst MA   Accompanied by Self         8/18/2023     8:02 AM   Patient Reported Additional Medications   Patient reports taking the following new medications None       Are you in the first 12 months of your Medicare coverage?  No    Healthy Habits:     In general, how would you rate your overall health?  Good    Frequency of exercise:  2-3 days/week    Duration of exercise:  15-30 minutes    Do you usually eat at least 4 servings of fruit and vegetables a day, include whole grains    & fiber and avoid regularly eating high fat or \"junk\" foods?  Yes    Taking medications regularly:  Yes    Medication side effects:  None    Ability to successfully perform activities of daily living:  No assistance needed    Home Safety:  Throw rugs in the hallway and lack of grab bars in the bathroom    Hearing Impairment:  No hearing concerns    In the past 6 months, have you been bothered by leaking of urine?  No    In general, how would you rate your overall mental or emotional health?  Good    Additional concerns today:  Yes        Have you ever done Advance Care Planning? (For example, a Health Directive, POLST, or a discussion with a medical provider or your loved ones about your wishes): Yes, patient states has an Advance Care Planning document and will bring a copy to the clinic.    1) She had ADHD assessment a few months ago.  Was diagnosed as being borderline for ADD.      2) She would like to find functional medicine practitioner to help with mold toxicity.    3) She has a vein that popped out on her right leg.  No pain / swelling.    4) She has had 2 sinus infections in the last 6 months.  She uses sinus rinse on regular basis.  One infection occurred after travel to [place] when she was stressed.    5) Pt snores a lot at night.  No apnea per her daughter.       Fall risk  Fallen 2 " or more times in the past year?: No  Any fall with injury in the past year?: No    Cognitive Screening   1) Repeat 3 items (Leader, Season, Table)    2) Clock draw: NORMAL  3) 3 item recall: Recalls 3 objects  Results: 3 items recalled: COGNITIVE IMPAIRMENT LESS LIKELY    Mini-CogTM Copyright S Drew. Licensed by the author for use in [facility]; reprinted with permission (ash@[facility]). All rights reserved.      Do you have sleep apnea, excessive snoring or daytime drowsiness? : Snoring    Reviewed and updated as needed this visit by clinical staff   Tobacco  Allergies  Meds      Soc Hx        Reviewed and updated as needed this visit by Provider                 Social History     Tobacco Use     Smoking status: Former     Packs/day: 0.10     Years: 2.00     Pack years: 0.20     Types: Cigarettes     Start date: 10/1/1978     Quit date: 5/15/1981     Years since quittin.2     Smokeless tobacco: Never     Tobacco comments:     in 20's and 30's   Substance Use Topics     Alcohol use: Not Currently             2023     8:13 PM   Alcohol Use   Prescreen: >3 drinks/day or >7 drinks/week? Not Applicable     Do you have a current opioid prescription? No  Do you use any other controlled substances or medications that are not prescribed by a provider? None              Current providers sharing in care for this patient include:   Patient Care Team:  Melissa Walsh MD as PCP - General (Family Practice)  Melissa Walsh MD as Assigned PCP  Lizabeth Diez PsyD as Assigned Behavioral Health Provider    The following health maintenance items are reviewed in Epic and correct as of today:  Health Maintenance   Topic Date Due     Pneumococcal Vaccine: 65+ Years (1 - PCV) Never done     ZOSTER IMMUNIZATION (1 of 2) Never done     COVID-19 Vaccine (6 - Moderna series) 2023     MEDICARE ANNUAL WELLNESS VISIT  2023     INFLUENZA VACCINE (1) 2023     PHQ-9  2024  "    TSH W/FREE T4 REFLEX  08/18/2024     ANNUAL REVIEW OF HM ORDERS  08/18/2024     FALL RISK ASSESSMENT  08/18/2024     MAMMO SCREENING  09/06/2024     COLORECTAL CANCER SCREENING  08/11/2025     LIPID  08/18/2028     ADVANCE CARE PLANNING  08/18/2028     DEXA  01/21/2029     DTAP/TDAP/TD IMMUNIZATION (3 - Td or Tdap) 02/03/2030     HEPATITIS C SCREENING  Completed     DEPRESSION ACTION PLAN  Completed     IPV IMMUNIZATION  Aged Out     MENINGITIS IMMUNIZATION  Aged Out               Review of Systems   Constitutional:  Positive for chills. Negative for fever.   HENT:  Positive for congestion, ear pain and sore throat. Negative for hearing loss.    Eyes:  Positive for pain. Negative for visual disturbance.   Respiratory:  Positive for cough. Negative for shortness of breath.    Cardiovascular:  Negative for chest pain, palpitations and peripheral edema.   Gastrointestinal:  Negative for abdominal pain, constipation, diarrhea, heartburn, hematochezia and nausea.   Breasts:  Negative for tenderness, breast mass and discharge.   Genitourinary:  Negative for dysuria, frequency, genital sores, hematuria, pelvic pain, urgency, vaginal bleeding and vaginal discharge.   Musculoskeletal:  Negative for arthralgias, joint swelling and myalgias.   Skin:  Negative for rash.   Neurological:  Positive for dizziness and headaches. Negative for weakness and paresthesias.   Psychiatric/Behavioral:  Negative for mood changes.          OBJECTIVE:   /63 (BP Location: Left arm, Patient Position: Sitting, Cuff Size: Adult Regular)   Pulse 65   Resp 14   Ht 1.638 m (5' 4.5\")   Wt 66.7 kg (147 lb)   LMP 09/14/2006   SpO2 97%   BMI 24.84 kg/m   Estimated body mass index is 24.84 kg/m  as calculated from the following:    Height as of this encounter: 1.638 m (5' 4.5\").    Weight as of this encounter: 66.7 kg (147 lb).  Physical Exam  GENERAL APPEARANCE: healthy, alert and no distress  EYES: Eyes grossly normal to inspection, " PERRL and conjunctivae and sclerae normal  HENT: ear canals and TM's normal, nose and mouth without ulcers or lesions, oropharynx clear and oral mucous membranes moist  NECK: no adenopathy, no asymmetry, masses, or scars and thyroid normal to palpation  RESP: lungs clear to auscultation - no rales, rhonchi or wheezes  BREAST: normal without masses, tenderness or nipple discharge and no palpable axillary masses or adenopathy  CV: regular rate and rhythm, normal S1 S2, no S3 or S4, no murmur, click or rub, no peripheral edema and peripheral pulses strong  ABDOMEN: soft, nontender, no hepatosplenomegaly, no masses and bowel sounds normal  MS: no musculoskeletal defects are noted and gait is age appropriate without ataxia  SKIN: no suspicious lesions or rashes  NEURO: Normal strength and tone, sensory exam grossly normal, mentation intact and speech normal  PSYCH: mentation appears normal and affect normal/bright    Labs reviewed in Epic    ASSESSMENT / PLAN:   Adalgisa was seen today for physical.    Diagnoses and all orders for this visit:    Encounter for Medicare annual wellness exam    Acquired hypothyroidism  -     TSH; Future  -     T4 free; Future  -     T3 Free; Future  -     TSH  -     T4 free  -     T3 Free    Snoring  -     Adult Sleep Eval & Management  Referral; Future    Post-menopausal  -     DX Hip/Pelvis/Spine; Future    Health care maintenance  -     REVIEW OF HEALTH MAINTENANCE PROTOCOL ORDERS    Vitamin D deficiency  -     Vitamin D Deficiency; Future  -     Vitamin D Deficiency    Encounter for screening for other metabolic disorders  -     Comprehensive metabolic panel; Future  -     Lipid panel reflex to direct LDL Fasting; Future  -     CBC with platelets; Future  -     Hemoglobin A1c; Future  -     Comprehensive metabolic panel  -     Lipid panel reflex to direct LDL Fasting  -     CBC with platelets  -     Hemoglobin A1c    Other orders  -     zoster vaccine recombinant adjuvanted  (SHINGRIX) injection; Inject 0.5 mLs into the muscle once for 1 dose Pharmacist administered  -     PRIMARY CARE FOLLOW-UP SCHEDULING; Future      [email or web address]    The Open Focus Brain - by Marvel Mead    Driven to Distraction - Dr. Chris Mason    Ion Sinus nasal spray - [email or web address]        COUNSELING:  Reviewed preventive health counseling, as reflected in patient instructions       Regular exercise       Healthy diet/nutrition        She reports that she quit smoking about 42 years ago. Her smoking use included cigarettes. She started smoking about 44 years ago. She has a 0.20 pack-year smoking history. She has never used smokeless tobacco.      Appropriate preventive services were discussed with this patient, including applicable screening as appropriate for cardiovascular disease, diabetes, osteopenia/osteoporosis, and glaucoma.  As appropriate for age/gender, discussed screening for colorectal cancer, prostate cancer, breast cancer, and cervical cancer. Checklist reviewing preventive services available has been given to the patient.    Reviewed patients plan of care and provided an AVS. The Basic Care Plan (routine screening as documented in Health Maintenance) for Adalgisa meets the Care Plan requirement. This Care Plan has been established and reviewed with the Patient.          Melissa Walsh MD  [facility]    Identified Health Risks:  I have reviewed Opioid Use Disorder and Substance Use Disorder risk factors and made any needed referrals. Answers submitted by the patient for this visit:  Patient Health Questionnaire (Submitted on 8/17/2023)  PHQ9 TOTAL SCORE: 5  The patient's PHQ-9 score is consistent with mild depression.

## 2023-08-18 NOTE — PATIENT INSTRUCTIONS
[email or web address]    The Open Focus Brain - by Marvel [place]    Driven to Distraction - Dr. Chris Mason    Ion Sinus nasal spray - [email or web address]        Patient Education   Personalized Prevention Plan  You are due for the preventive services outlined below.  Your care team is available to assist you in scheduling these services.  If you have already completed any of these items, please share that information with your care team to update in your medical record.  Health Maintenance Due   Topic Date Due     Pneumococcal Vaccine (1 - PCV) Never done     Zoster (Shingles) Vaccine (1 of 2) Never done     COVID-19 Vaccine (6 - Moderna series) 02/04/2023     Annual Wellness Visit  05/31/2023     Learning About Depression Screening  What is depression screening?  Depression screening is a way to see if you have depression symptoms. It may be done by a doctor or counselor. It's often part of a routine checkup. That's because your mental health is just as important as your physical health.  Depression is a mental health condition that affects how you feel, think, and act. You may:  Have less energy.  Lose interest in your daily activities.  Feel sad and grouchy for a long time.  Depression is very common. It affects people of all ages.  Many things can lead to depression. Some people become depressed after they have a stroke or find out they have a major illness like cancer or heart disease. The death of a loved one or a breakup may lead to depression. It can run in families. Most experts believe that a combination of inherited genes and stressful life events can cause it.  What happens during screening?  You may be asked to fill out a form about your depression symptoms. You and the doctor will discuss your answers. The doctor may ask you more questions to learn more about how you think, act, and feel.  What happens after screening?  If you have symptoms of depression, your doctor will talk  "to you about your options.  Doctors usually treat depression with medicines or counseling. Often, combining the two works best. Many people don't get help because they think that they'll get over the depression on their own. But people with depression may not get better unless they get treatment.  The cause of depression is not well understood. There may be many factors involved. But if you have depression, it's not your fault.  A serious symptom of depression is thinking about death or suicide. If you or someone you care about talks about this or about feeling hopeless, get help right away.  It's important to know that depression can be treated. Medicine, counseling, and self-care may help.  Where can you learn more?  Go to https://www.[employer].net/patiented  Enter T185 in the search box to learn more about \"Learning About Depression Screening.\"  Current as of: October 20, 2022               Content Version: 13.7    3992-5878 [employer].   Care instructions adapted under license by your healthcare professional. If you have questions about a medical condition or this instruction, always ask your healthcare professional. [employer] disclaims any warranty or liability for your use of this information.         "

## 2023-08-19 LAB — DEPRECATED CALCIDIOL+CALCIFEROL SERPL-MC: 75 UG/L (ref 20–75)

## 2023-08-22 ENCOUNTER — MYC MEDICAL ADVICE (OUTPATIENT)
Dept: FAMILY MEDICINE | Facility: CLINIC | Age: 73
End: 2023-08-22
Payer: COMMERCIAL

## 2023-08-24 NOTE — TELEPHONE ENCOUNTER
Patient's insurance does not require referrals. If order is placed, it can be sent to [facility]. Referral Specialist is not aware of appropriate order type for patient's request.     Spoke with [facility]. The only services that require orders for Medicare enrollees are nutrition (Medicare and Medicare replacement plans ONLY cover nutrition related to diabetes or ESRD) and acupuncture (ONLY for Vertebrogenic low back pain). All other services, including integrative consultations, can be scheduled without an order but patient would need to contact her Medicare plan to check coverage. Billing codes and patient guide to check coverage for consultations are provided on [facility] webpage.

## 2023-09-11 ENCOUNTER — VIRTUAL VISIT (OUTPATIENT)
Dept: FAMILY MEDICINE | Facility: CLINIC | Age: 73
End: 2023-09-11
Payer: COMMERCIAL

## 2023-09-11 DIAGNOSIS — I48.0 PAROXYSMAL ATRIAL FIBRILLATION (H): ICD-10-CM

## 2023-09-11 DIAGNOSIS — R53.82 CHRONIC FATIGUE: Primary | ICD-10-CM

## 2023-09-11 DIAGNOSIS — J32.9 CHRONIC SINUSITIS, UNSPECIFIED LOCATION: ICD-10-CM

## 2023-09-11 PROCEDURE — 99215 OFFICE O/P EST HI 40 MIN: CPT | Mod: 95 | Performed by: NURSE PRACTITIONER

## 2023-09-11 NOTE — PROGRESS NOTES
Adalgisa is a 73 year old who is being evaluated via a billable video visit.      How would you like to obtain your AVS? MyChart  If the video visit is dropped, the invitation should be resent by: Send to e-mail at: [email or web address]  Will anyone else be joining your video visit? No          Assessment & Plan     Chronic fatigue  I am not familiar with these labs requested by [facility], but I can attempt to order them.  We discussed they may not be covered by Medicare.  I do agree with checking a ferritin level as a possible source of fatigue.  I will defer interpretation of the other lab values to her integrative specialist.  - Ferritin; Future  - Homocysteine; Future  - Complement C3A Level; Future  - ZZHCL C4ADES ARG LEVEL  - Transforming Growth Factor B1; Future  - [facility]; MMP9 (Laboratory Miscellaneous Order); Future    Chronic sinusitis, unspecified location  Suspect could be environmental and recommended [place] panel rather than the above labs.  Her integrative provider is also requesting a Nasal swab to rule out MARcoNS which I assume is MRSA test in our system.  Would consider referral to ENT for chronic sinus issue, can recommend at future visit.    - [place] Resp Allergen Panel; Future  - Methicillin Resistant Staph Aureus PCR    Paroxysmal atrial fibrillation (H)  Stable, no new symptoms.  Continue to monitor.       40 minutes spent by me on the date of the encounter doing chart review, history and exam, documentation and further activities per the note           SHAHRIAR Waldrop CNP  [facility]    Subjective   Adalgisa is a 73 year old, presenting for the following health issues:  Recheck Medication      9/11/2023     1:02 PM   Additional Questions   Roomed by Eveline CAI       History of Present Illness       Reason for visit:  Exhaustion    She eats 2-3 servings of fruits and vegetables daily.She consumes 0 sweetened beverage(s) daily.She exercises with  enough effort to increase her heart rate 10 to 19 minutes per day.  She exercises with enough effort to increase her heart rate 4 days per week.   She is taking medications regularly.       Her physician is leaving [facility]. WIll eventually be working with . Teresa Cortez    Has Chronic lyme's and Mold issues per her report.    Working with an integrative health provider who wants labs done, she is hoping by doing them through [facility] she an get them covered through medicare.      Persistent sinus infection, eye pain.  Thinking this is Lyme's issue.  Often happens in the fall, after traveling.  Last time happened after coming back from mission trip in [facility]      Review of Systems   Constitutional, HEENT, cardiovascular, pulmonary, gi and gu systems are negative, except as otherwise noted.      Objective    Vitals - Patient Reported  Pain Score: No Pain (0)        Physical Exam                   Phone call Duration: 25minutes

## 2023-09-14 ENCOUNTER — LAB (OUTPATIENT)
Dept: LAB | Facility: CLINIC | Age: 73
End: 2023-09-14
Payer: COMMERCIAL

## 2023-09-14 DIAGNOSIS — J32.9 CHRONIC SINUSITIS, UNSPECIFIED LOCATION: Primary | ICD-10-CM

## 2023-09-14 DIAGNOSIS — J32.9 CHRONIC SINUSITIS, UNSPECIFIED LOCATION: ICD-10-CM

## 2023-09-14 DIAGNOSIS — R53.82 CHRONIC FATIGUE: ICD-10-CM

## 2023-09-14 DIAGNOSIS — R53.82 CHRONIC FATIGUE: Primary | ICD-10-CM

## 2023-09-14 LAB — FERRITIN SERPL-MCNC: 59 NG/ML (ref 11–328)

## 2023-09-14 PROCEDURE — 83520 IMMUNOASSAY QUANT NOS NONAB: CPT | Mod: 90

## 2023-09-14 PROCEDURE — 86003 ALLG SPEC IGE CRUDE XTRC EA: CPT

## 2023-09-14 PROCEDURE — 99000 SPECIMEN HANDLING OFFICE-LAB: CPT

## 2023-09-14 PROCEDURE — 86160 COMPLEMENT ANTIGEN: CPT | Mod: 90

## 2023-09-14 PROCEDURE — 83090 ASSAY OF HOMOCYSTEINE: CPT

## 2023-09-14 PROCEDURE — 36415 COLL VENOUS BLD VENIPUNCTURE: CPT

## 2023-09-14 PROCEDURE — 82728 ASSAY OF FERRITIN: CPT

## 2023-09-14 PROCEDURE — 82785 ASSAY OF IGE: CPT

## 2023-09-14 PROCEDURE — 87070 CULTURE OTHR SPECIMN AEROBIC: CPT

## 2023-09-16 LAB — BACTERIA SPEC CULT: NORMAL

## 2023-09-18 LAB

## 2023-09-19 ENCOUNTER — MYC MEDICAL ADVICE (OUTPATIENT)
Dept: FAMILY MEDICINE | Facility: CLINIC | Age: 73
End: 2023-09-19
Payer: COMMERCIAL

## 2023-09-20 LAB — HCYS SERPL-SCNC: 6.9 UMOL/L (ref 4–12)

## 2023-09-22 LAB — SCANNED LAB RESULT: NORMAL

## 2023-10-02 DIAGNOSIS — E03.9 ACQUIRED HYPOTHYROIDISM: ICD-10-CM

## 2023-10-03 RX ORDER — LEVOTHYROXINE SODIUM 137 UG/1
137 TABLET ORAL DAILY
Qty: 90 TABLET | Refills: 0 | Status: SHIPPED | OUTPATIENT
Start: 2023-10-03 | End: 2023-12-22

## 2023-10-05 LAB
SCANNED LAB RESULT: NORMAL
SCANNED LAB RESULT: NORMAL

## 2023-10-13 ENCOUNTER — ANCILLARY PROCEDURE (OUTPATIENT)
Dept: MAMMOGRAPHY | Facility: CLINIC | Age: 73
End: 2023-10-13
Attending: FAMILY MEDICINE
Payer: COMMERCIAL

## 2023-10-13 DIAGNOSIS — Z12.31 VISIT FOR SCREENING MAMMOGRAM: ICD-10-CM

## 2023-10-13 PROCEDURE — 77067 SCR MAMMO BI INCL CAD: CPT | Mod: TC | Performed by: RADIOLOGY

## 2023-10-13 PROCEDURE — 77063 BREAST TOMOSYNTHESIS BI: CPT | Mod: TC | Performed by: RADIOLOGY

## 2023-10-19 NOTE — PROGRESS NOTES
"SUBJECTIVE:   Adalgisa Dennis is a 72 year old female who presents for Preventive Visit.      Patient has been advised of split billing requirements and indicates understanding: Yes  Are you in the first 12 months of your Medicare coverage?  No    Healthy Habits:     In general, how would you rate your overall health?  Good    Frequency of exercise:  4-5 days/week    Duration of exercise:  15-30 minutes    Do you usually eat at least 4 servings of fruit and vegetables a day, include whole grains    & fiber and avoid regularly eating high fat or \"junk\" foods?  Yes    Taking medications regularly:  Yes    Medication side effects:  None    Ability to successfully perform activities of daily living:  No assistance needed    Home Safety:  Throw rugs in the hallway and lack of grab bars in the bathroom    Hearing Impairment:  No hearing concerns    In the past 6 months, have you been bothered by leaking of urine?  No    In general, how would you rate your overall mental or emotional health?  Good      PHQ-2 Total Score: 2    Additional concerns today:  No    Do you feel safe in your environment? Yes    Have you ever done Advance Care Planning? (For example, a Health Directive, POLST, or a discussion with a medical provider or your loved ones about your wishes): Yes, patient states has an Advance Care Planning document and will bring a copy to the clinic.       Fall risk  Fallen 2 or more times in the past year?: No  Any fall with injury in the past year?: No    Cognitive Screening   1) Repeat 3 items (Leader, Season, Table) Normal  2) Clock draw: NORMAL  3) 3 item recall: Recalls 3 objects  Results: 3 items recalled: COGNITIVE IMPAIRMENT LESS LIKELY    Mini-CogTM Copyright DEMETRIA Russell. Licensed by the author for use in [facility]; reprinted with permission (ash@.[facility]). All rights reserved.      1) She has been having some attacks of fatigue that come on suddenly.  Her mold toxicity is improving. She sees " What Type Of Note Output Would You Prefer (Optional)?: Bullet Format Dr. Bernarda Zenker for functional medicine.  2) Hypothyroidism  - She has been taking levothyroxine / Cytomel.  Wonders about taking [place] thyroid.    3) She takes Eliquis for A fib.  No bleeding problems.  Uses propranolol PRN.  4) She would like a refill of lorazepam.  Uses for anxiety due to flying.    Reviewed and updated as needed this visit by clinical staff   Tobacco  Allergies  Meds                Reviewed and updated as needed this visit by Provider                   Social History     Tobacco Use     Smoking status: Former Smoker     Types: Cigarettes, Cigarettes     Smokeless tobacco: Never Used     Tobacco comment: in 20's and 30's   Substance Use Topics     Alcohol use: No     Alcohol/week: 0.0 standard drinks     If you drink alcohol do you typically have >3 drinks per day or >7 drinks per week? No    Alcohol Use 5/31/2022   Prescreen: >3 drinks/day or >7 drinks/week? No   Prescreen: >3 drinks/day or >7 drinks/week? -     Current providers sharing in care for this patient include:   Patient Care Team:  Melissa Walsh MD as PCP - General (Family Practice)  Melissa Walsh MD as Assigned PCP    The following health maintenance items are reviewed in Epic and correct as of today:  Health Maintenance Due   Topic Date Due     ANNUAL REVIEW OF  ORDERS  Never done     ZOSTER IMMUNIZATION (1 of 2) Never done     LUNG CANCER SCREENING  Never done     Pneumococcal Vaccine: 65+ Years (1 - PCV) Never done     MEDICARE ANNUAL WELLNESS VISIT  04/12/2022     TSH W/FREE T4 REFLEX  04/12/2022               Review of Systems   Constitutional: Negative for chills and fever.   HENT: Negative for congestion, ear pain, hearing loss and sore throat.    Eyes: Negative for pain and visual disturbance.   Respiratory: Negative for cough and shortness of breath.    Cardiovascular: Negative for chest pain, palpitations and peripheral edema.   Gastrointestinal: Positive for constipation. Negative for  Is The Patient Presenting As Previously Scheduled?: Yes "abdominal pain, diarrhea, heartburn, hematochezia and nausea.   Genitourinary: Negative for dysuria, frequency, genital sores, hematuria and urgency.   Musculoskeletal: Positive for myalgias. Negative for arthralgias and joint swelling.   Skin: Negative for rash.   Neurological: Negative for dizziness, weakness, headaches and paresthesias.   Psychiatric/Behavioral: Negative for mood changes. The patient is nervous/anxious.          OBJECTIVE:   /52 (BP Location: Left arm, Patient Position: Sitting, Cuff Size: Adult Regular)   Pulse 65   Temp 97.8  F (36.6  C) (Oral)   Resp 16   Ht 1.638 m (5' 4.5\")   Wt 63.1 kg (139 lb 1.6 oz)   LMP 09/14/2006   SpO2 98%   BMI 23.51 kg/m   Estimated body mass index is 23.51 kg/m  as calculated from the following:    Height as of this encounter: 1.638 m (5' 4.5\").    Weight as of this encounter: 63.1 kg (139 lb 1.6 oz).  Physical Exam  GENERAL APPEARANCE: healthy, alert and no distress  EYES: Eyes grossly normal to inspection, PERRL and conjunctivae and sclerae normal  HENT: ear canals and TM's normal, nose and mouth without ulcers or lesions, oropharynx clear and oral mucous membranes moist  NECK: no adenopathy, no asymmetry, masses, or scars and thyroid normal to palpation  RESP: lungs clear to auscultation - no rales, rhonchi or wheezes  BREAST: normal without masses, tenderness or nipple discharge and no palpable axillary masses or adenopathy  CV: regular rate and rhythm, normal S1 S2, no S3 or S4, no murmur, click or rub, no peripheral edema and peripheral pulses strong  ABDOMEN: soft, nontender, no hepatosplenomegaly, no masses and bowel sounds normal  MS: no musculoskeletal defects are noted and gait is age appropriate without ataxia  SKIN: no suspicious lesions or rashes  NEURO: Normal strength and tone, sensory exam grossly normal, mentation intact and speech normal  PSYCH: mentation appears normal and affect normal/bright        ASSESSMENT / PLAN: " How Severe Is Your Rash?: moderate "    Adalgisa was seen today for wellness visit.    Diagnoses and all orders for this visit:    Health care maintenance  -     REVIEW OF HEALTH MAINTENANCE PROTOCOL ORDERS  Up to date with mammogram/ colon cancer screening.    Acquired hypothyroidism  -     levothyroxine (SYNTHROID/LEVOTHROID) 137 MCG tablet; TAKE 1 TABLET (137 MCG TOTAL) BY MOUTH DAILY.  -     liothyronine (CYTOMEL) 5 MCG tablet; TAKE 2 TABLETS (10 MCG) BY MOUTH DAILY ON AN EMPTY STOMACH  -     T3 Free; Future  -     T4 free; Future  -     TSH; Future  -     T3 Free  -     T4 free  -     TSH    Anxiety attack-  Comments:  lorazepam only as needed    Flying phobia  -     LORazepam (ATIVAN) 0.5 MG tablet; TAKE 1 TABLET (0.5 MG TOTAL) BY MOUTH DAILY AS NEEDED FOR ANXIETY.    Paroxysmal atrial fibrillation (H)  -     ELIQUIS ANTICOAGULANT 5 MG tablet; Take 1 tablet (5 mg) by mouth 2 times daily  -     propranolol (INDERAL) 20 MG tablet; TAKE 1 TABLET (20 MG) BY MOUTH ONCE DAILY AS NEEDED    Fatigue, unspecified type  -     Comprehensive metabolic panel; Future  -     CBC with platelets; Future  -     C-Reactive Protein, High Sensitivity; Future  -     Comprehensive metabolic panel  -     CBC with platelets  -     C-Reactive Protein, High Sensitivity    Encounter for screening for other metabolic disorders  -     LDL cholesterol direct; Future  -     HDL cholesterol; Future  -     Cholesterol; Future  -     LDL cholesterol direct  -     HDL cholesterol  -     Cholesterol    Vitamin D deficiency  -     Vitamin D Deficiency; Future  -     Vitamin D Deficiency    Other orders  -     PRIMARY CARE FOLLOW-UP SCHEDULING; Future            COUNSELING:  Reviewed preventive health counseling, as reflected in patient instructions       Regular exercise       Healthy diet/nutrition    Estimated body mass index is 23.51 kg/m  as calculated from the following:    Height as of this encounter: 1.638 m (5' 4.5\").    Weight as of this encounter: 63.1 kg (139 lb 1.6 " Is This A New Presentation, Or A Follow-Up?: Rash oz).        She reports that she has quit smoking. Her smoking use included cigarettes and cigarettes. She has never used smokeless tobacco.      Appropriate preventive services were discussed with this patient, including applicable screening as appropriate for cardiovascular disease, diabetes, osteopenia/osteoporosis, and glaucoma.  As appropriate for age/gender, discussed screening for colorectal cancer, prostate cancer, breast cancer, and cervical cancer. Checklist reviewing preventive services available has been given to the patient.    Reviewed patients plan of care and provided an AVS. The Basic Care Plan (routine screening as documented in Health Maintenance) for Adalgisa meets the Care Plan requirement. This Care Plan has been established and reviewed with the Patient.    Counseling Resources:  ATP IV Guidelines  Pooled Cohorts Equation Calculator  Breast Cancer Risk Calculator  Breast Cancer: Medication to Reduce Risk  FRAX Risk Assessment  ICSI Preventive Guidelines  Dietary Guidelines for Americans, 2010  USDA's MyPlate  ASA Prophylaxis  Lung CA Screening    Melissa Walsh MD  [facility]    Identified Health Risks:  Answers for HPI/ROS submitted by the patient on 5/31/2022  If you checked off any problems, how difficult have these problems made it for you to do your work, take care of things at home, or get along with other people?: Not difficult at all  PHQ9 TOTAL SCORE: 2

## 2023-11-08 ENCOUNTER — TRANSFERRED RECORDS (OUTPATIENT)
Dept: HEALTH INFORMATION MANAGEMENT | Facility: CLINIC | Age: 73
End: 2023-11-08
Payer: COMMERCIAL

## 2023-11-29 DIAGNOSIS — I48.0 PAROXYSMAL ATRIAL FIBRILLATION (H): ICD-10-CM

## 2023-11-30 RX ORDER — APIXABAN 5 MG/1
5 TABLET, FILM COATED ORAL 2 TIMES DAILY
Qty: 180 TABLET | Refills: 0 | Status: SHIPPED | OUTPATIENT
Start: 2023-11-30 | End: 2023-12-22

## 2023-12-11 DIAGNOSIS — E03.9 ACQUIRED HYPOTHYROIDISM: ICD-10-CM

## 2023-12-11 RX ORDER — LIOTHYRONINE SODIUM 5 UG/1
TABLET ORAL
Qty: 60 TABLET | Refills: 11 | OUTPATIENT
Start: 2023-12-11

## 2023-12-22 ENCOUNTER — OFFICE VISIT (OUTPATIENT)
Dept: FAMILY MEDICINE | Facility: CLINIC | Age: 73
End: 2023-12-22
Payer: COMMERCIAL

## 2023-12-22 VITALS
DIASTOLIC BLOOD PRESSURE: 64 MMHG | WEIGHT: 150.6 LBS | HEART RATE: 78 BPM | OXYGEN SATURATION: 98 % | HEIGHT: 64 IN | BODY MASS INDEX: 25.71 KG/M2 | TEMPERATURE: 98.3 F | SYSTOLIC BLOOD PRESSURE: 102 MMHG | RESPIRATION RATE: 16 BRPM

## 2023-12-22 DIAGNOSIS — I48.0 PAROXYSMAL ATRIAL FIBRILLATION (H): ICD-10-CM

## 2023-12-22 DIAGNOSIS — J32.9 CHRONIC SINUSITIS, UNSPECIFIED LOCATION: Primary | ICD-10-CM

## 2023-12-22 DIAGNOSIS — E03.9 ACQUIRED HYPOTHYROIDISM: ICD-10-CM

## 2023-12-22 DIAGNOSIS — Z91.018 FOOD ALLERGY: ICD-10-CM

## 2023-12-22 DIAGNOSIS — J30.9 ALLERGIC RHINITIS, UNSPECIFIED SEASONALITY, UNSPECIFIED TRIGGER: ICD-10-CM

## 2023-12-22 PROCEDURE — 36415 COLL VENOUS BLD VENIPUNCTURE: CPT | Performed by: FAMILY MEDICINE

## 2023-12-22 PROCEDURE — 86001 ALLERGEN SPECIFIC IGG: CPT | Mod: 90 | Performed by: FAMILY MEDICINE

## 2023-12-22 PROCEDURE — 86003 ALLG SPEC IGE CRUDE XTRC EA: CPT | Performed by: FAMILY MEDICINE

## 2023-12-22 PROCEDURE — 99000 SPECIMEN HANDLING OFFICE-LAB: CPT | Mod: GA | Performed by: FAMILY MEDICINE

## 2023-12-22 PROCEDURE — 99214 OFFICE O/P EST MOD 30 MIN: CPT | Performed by: FAMILY MEDICINE

## 2023-12-22 RX ORDER — PROPRANOLOL HYDROCHLORIDE 20 MG/1
TABLET ORAL
Qty: 50 TABLET | Refills: 1 | Status: SHIPPED | OUTPATIENT
Start: 2023-12-22

## 2023-12-22 RX ORDER — LIOTHYRONINE SODIUM 5 UG/1
TABLET ORAL
Qty: 180 TABLET | Refills: 4 | Status: SHIPPED | OUTPATIENT
Start: 2023-12-22

## 2023-12-22 RX ORDER — LEVOTHYROXINE SODIUM 137 UG/1
137 TABLET ORAL DAILY
Qty: 90 TABLET | Refills: 4 | Status: SHIPPED | OUTPATIENT
Start: 2023-12-22

## 2023-12-22 RX ORDER — APIXABAN 5 MG/1
5 TABLET, FILM COATED ORAL 2 TIMES DAILY
Qty: 180 TABLET | Refills: 3 | Status: SHIPPED | OUTPATIENT
Start: 2023-12-22 | End: 2024-02-03

## 2023-12-22 ASSESSMENT — PAIN SCALES - GENERAL: PAINLEVEL: NO PAIN (0)

## 2023-12-22 NOTE — COMMUNITY RESOURCES LIST (ENGLISH)
12/22/2023   [facility] VTM  N/A  For questions about this resource list or additional care needs, please contact your primary care clinic or care manager.  Phone: [phone]   Email: N/A   Address: [facility] [place] [address]   Hours: N/A        Financial Stability       Utility payment assistance  1  [employer] [facility] ([facility]) [facility] - Energy Assistance Distance: 2.68 miles      Phone/Virtual   [address] [place] [address]  Language: English, Hmong, Stateless, Belizean  Hours: Mon - Fri 8:00 AM - 4:30 PM  Fees: Free   Phone: (584) 792-4096 Email: info@[employer].org Website: http://www.[employer].org/     2  [facility] Distance: 3.61 miles      In-Person   [address] [place] [address]  Language: English  Hours: Mon - Fri 8:00 AM - 3:00 PM  Fees: Free   Phone: (828) 955-9002 Ext.14 Email: neighborhood@[facility].[employer] Website: http://www.[facility].org          Important Numbers & Websites       Emergency Services   911  City Services   311  Poison Control   (190) 669-3069  Suicide Prevention Lifeline   (946) 974-9296 (TALK)  Child Abuse Hotline   (710) 190-2517 (4-A-Child)  Sexual Assault Hotline   (920) 804-4778 (HOPE)  National Runaway Safeline   (155) 173-9553 (RUNAWAY)  All-Options Talkline   (512) 572-5597  Substance Abuse Referral   (336) 343-4996 (HELP)

## 2023-12-22 NOTE — COMMUNITY RESOURCES LIST (ENGLISH)
12/22/2023   [facility] flaveit  N/A  For questions about this resource list or additional care needs, please contact your primary care clinic or care manager.  Phone: [phone]   Email: N/A   Address: [facility] [place] [address]   Hours: N/A        Financial Stability       Utility payment assistance  1  [employer] [facility] ([facility]) [facility] - Energy Assistance Distance: 2.68 miles      Phone/Virtual   [address] [place] [address]  Language: English, Hmong, Ecuadorean, Albanian  Hours: Mon - Fri 8:00 AM - 4:30 PM  Fees: Free   Phone: (836) 899-1668 Email: info@[employer].org Website: http://www.[employer].org/     2  [facility] Distance: 3.61 miles      In-Person   [address] [place] [address]  Language: English  Hours: Mon - Fri 8:00 AM - 3:00 PM  Fees: Free   Phone: (935) 162-5772 Ext.14 Email: neighborhood@[facility].[employer] Website: http://www.[facility].org          Important Numbers & Websites       Emergency Services   911  City Services   311  Poison Control   (704) 351-2463  Suicide Prevention Lifeline   (812) 668-4573 (TALK)  Child Abuse Hotline   (169) 398-7326 (4-A-Child)  Sexual Assault Hotline   (101) 678-3276 (HOPE)  National Runaway Safeline   (679) 907-6721 (RUNAWAY)  All-Options Talkline   (747) 308-3848  Substance Abuse Referral   (324) 542-4364 (HELP)

## 2023-12-22 NOTE — PROGRESS NOTES
Assessment & Plan     Chronic sinusitis, unspecified location  and   Food allergy  Allergic rhinitis, unspecified seasonality, unspecified trigger  Follows with naturopath and with integrative NP at [facility]  Needs some food allergy panel testing given her chronic allergic symptoms; recommended by naturopath but I'm okay ordering given the chronicity of her difficult to treat symptoms; would be worth ruling out food allergan  Tests below ordered; will be visible on MyChart.  - Allergy adult food panel; Future  - Allergen casein IgG; Future  - Allergen casein IgE; Future  - Allergen whey IgE; Future  - Allergen wheat IgE; Future  - Allergen egg white IgE; Future  - Allergen walnuts IgE; Future  - Allergen peanut IgE; Future  - Allergen almonds IgE; Future  - Other Laboratory; [facility]; Allergen Casein IgG (Laboratory Miscellaneous Order); Future  - Allergy adult food panel  - Allergen casein IgE  - Allergen whey IgE  - Allergen wheat IgE  - Allergen egg white IgE  - Allergen walnuts IgE  - Allergen peanut IgE  - Allergen almonds IgE  - Other Laboratory; [facility]; Allergen Casein IgG (Laboratory Miscellaneous Order)      Paroxysmal atrial fibrillation (H)  Stable, uses propranolol only rarely and on anticoag  Happy to refill for now while she establishes care with new PCP; gave her a few options that I think would be good fits for her here  - propranolol (INDERAL) 20 MG tablet; TAKE 1 TABLET (20 MG) BY MOUTH ONCE DAILY AS NEEDED  - ELIQUIS ANTICOAGULANT 5 MG tablet; Take 1 tablet (5 mg) by mouth 2 times daily    Acquired hypothyroidism  Stable, last TSH in range 4 months ago  Happy to refill for now while she establishes care with new PCP; gave her a few options that I think would be good fits for her here   levothyroxine (SYNTHROID/LEVOTHROID) 137 MCG tablet; Take 1 tablet (137 mcg) by mouth daily 30 minutes prior to breakfast.  - liothyronine (CYTOMEL) 5 MCG tablet; TAKE 2 TABLETS (10 MCG) BY  "MOUTH DAILY ON AN EMPTY STOMACH       BMI:   Estimated body mass index is 25.65 kg/m  as calculated from the following:    Height as of this encounter: 1.632 m (5' 4.25\").    Weight as of this encounter: 68.3 kg (150 lb 9.6 oz).       Teresa Cortez MD  [facility]    Blaze Diana is a 73 year old, presenting for the following health issues:  Recheck Medication and Establish Care        12/22/2023    10:59 AM   Additional Questions   Roomed by Brittani   Accompanied by Self         12/22/2023    10:59 AM   Patient Reported Additional Medications   Patient reports taking the following new medications None       History of Present Illness       Reason for visit:  Connect with new primary care dic    She eats 2-3 servings of fruits and vegetables daily.She consumes 0 sweetened beverage(s) daily.She exercises with enough effort to increase her heart rate 10 to 19 minutes per day.  She exercises with enough effort to increase her heart rate 3 or less days per week.   She is taking medications regularly.     73 year old new to me today; I let her know that unfortunately I'm not taking new patients at this time.    She still wants to keep appointment; has chronic Lyme and mold infestation.  Working with naturopath Roslyn Escamilla and an integrative NP at [facility].    Has been having chronic sinus issues.  And extensive food allergies.    Wonders about food allergy testing?  Specifically: IgG antibodies to casein, whey, wheat, egg, walnuts peanuts and almonds      Review of Systems   Constitutional, HEENT, cardiovascular, pulmonary, gi and gu systems are negative, except as otherwise noted.      Objective    /64 (BP Location: Right arm, Patient Position: Sitting, Cuff Size: Adult Regular)   Pulse 78   Temp 98.3  F (36.8  C) (Temporal)   Resp 16   Ht 1.632 m (5' 4.25\")   Wt 68.3 kg (150 lb 9.6 oz)   LMP  (LMP Unknown)   SpO2 98%   Breastfeeding No   BMI 25.65 kg/m  "   Body mass index is 25.65 kg/m .  Physical Exam   GENERAL: healthy, alert and no distress

## 2023-12-25 LAB
ALMOND IGE QN: <0.1 KU(A)/L
ALMOND IGE QN: <0.1 KU(A)/L
CASEIN IGE QN: <0.1 KU(A)/L
CASHEW NUT IGE QN: <0.1 KU(A)/L
CODFISH IGE QN: <0.1 KU(A)/L
COW MILK IGE QN: <0.1 KU(A)/L
EGG WHITE IGE QN: <0.1 KU(A)/L
EGG WHITE IGE QN: <0.1 KU(A)/L
HAZELNUT IGE QN: <0.1 KU(A)/L
IGE SERPL-ACNC: 63 KU/L (ref 0–114)
PEANUT IGE QN: <0.1 KU(A)/L
PEANUT IGE QN: <0.1 KU(A)/L
SALMON IGE QN: <0.1 KU(A)/L
SCALLOP IGE QN: <0.1 KU(A)/L
SESAME SEED IGE QN: <0.1 KU(A)/L
SHRIMP IGE QN: <0.1 KU(A)/L
SOYBEAN IGE QN: <0.1 KU(A)/L
TUNA IGE QN: <0.1 KU(A)/L
WALNUT IGE QN: <0.1 KU(A)/L
WALNUT IGE QN: <0.1 KU(A)/L
WHEAT IGE QN: <0.1 KU(A)/L
WHEAT IGE QN: <0.1 KU(A)/L

## 2023-12-27 LAB
COW WHEY IGE QN: <0.1 KU(A)/L
SCANNED LAB RESULT: ABNORMAL

## 2024-01-01 NOTE — TELEPHONE ENCOUNTER
Please call referral specialist and see how I can do a referral for integrative medicine for patient.  There is no such referral in [facility].   This note was copied from the mother's chart.     08/25/24 1150   Breasts WDL   Breast WDL WDL   Maternal Feeding Assessment   Maternal Emotional State relaxed   Infant Positioning clutch/football   Signs of Milk Transfer audible swallow;infant jaw motion present   Pain with Feeding no   Comfort Measures Before/During Feeding latch adjusted;infant position adjusted;maternal position adjusted   Latch Assistance yes   Reproductive Interventions   Breast Care: Breastfeeding open to air   Breastfeeding Assistance feeding cue recognition promoted;feeding on demand promoted;assisted with positioning;infant latch-on verified;feeding session observed;infant suck/swallow verified;support offered   Breastfeeding Support maternal rest encouraged;maternal nutrition promoted;maternal hydration promoted;lactation counseling provided;infant-mother separation minimized;encouragement provided;diary/feeding log utilized     Lactation note: lactation rounds. Basic education reviewed. Latch assistance provided. Wide latch with minimal assistance. Football hold. Infant drinking well with breast compression.

## 2024-02-03 ENCOUNTER — MYC REFILL (OUTPATIENT)
Dept: FAMILY MEDICINE | Facility: CLINIC | Age: 74
End: 2024-02-03
Payer: COMMERCIAL

## 2024-02-03 DIAGNOSIS — I48.0 PAROXYSMAL ATRIAL FIBRILLATION (H): ICD-10-CM

## 2024-02-09 RX ORDER — APIXABAN 5 MG/1
5 TABLET, FILM COATED ORAL 2 TIMES DAILY
Qty: 180 TABLET | Refills: 3 | Status: SHIPPED | OUTPATIENT
Start: 2024-02-09

## 2024-03-31 ASSESSMENT — PATIENT HEALTH QUESTIONNAIRE - PHQ9
SUM OF ALL RESPONSES TO PHQ QUESTIONS 1-9: 4
10. IF YOU CHECKED OFF ANY PROBLEMS, HOW DIFFICULT HAVE THESE PROBLEMS MADE IT FOR YOU TO DO YOUR WORK, TAKE CARE OF THINGS AT HOME, OR GET ALONG WITH OTHER PEOPLE: NOT DIFFICULT AT ALL
SUM OF ALL RESPONSES TO PHQ QUESTIONS 1-9: 4

## 2024-04-01 ENCOUNTER — VIRTUAL VISIT (OUTPATIENT)
Dept: FAMILY MEDICINE | Facility: CLINIC | Age: 74
End: 2024-04-01
Payer: COMMERCIAL

## 2024-04-01 DIAGNOSIS — R09.81 NASAL CONGESTION: Primary | ICD-10-CM

## 2024-04-01 PROCEDURE — 99213 OFFICE O/P EST LOW 20 MIN: CPT | Mod: 95 | Performed by: NURSE PRACTITIONER

## 2024-04-01 NOTE — PROGRESS NOTES
"Adalgisa is a 74 year old who is being evaluated via a billable video visit.    How would you like to obtain your AVS? Avosofthart  If the video visit is dropped, the invitation should be resent by: Send to e-mail at: [email or web address]  Will anyone else be joining your video visit? No      Assessment & Plan     Nasal congestion  I offered to repeat [place] allergy panel for the patient.  I also advised caution about expensive tests through naturopath as there are varying credentials and that many of these suggested tests do not change the clinical treatment, which would be to manage symptoms. (See 3/26 MyChart encounter).  I cannot order tests that I am not sure how to interpret and I would ultimately be responsible for those results.  Also discussed that I cannot be certain insurance would even cover these tests as I do not feel a strong indication at this time.  I would advise Flonase or oral allergy medication to treat the symptoms and patient was reluctantly agreeable to an allergy consult.  Patient should also establish care with a new PCP as she was advised last fall to manage ongoing chronic conditions.   - Adult Allergy/Asthma  Referral; Future            BMI  Estimated body mass index is 25.65 kg/m  as calculated from the following:    Height as of 12/22/23: 1.632 m (5' 4.25\").    Weight as of 12/22/23: 68.3 kg (150 lb 9.6 oz).             Subjective   Adalgisa is a 74 year old, presenting for the following health issues:  sinus issues      4/1/2024    11:47 AM   Additional Questions   Roomed by Tanja Beach Start Time:  1300    History of Present Illness       Reason for visit:  Sinus    She eats 2-3 servings of fruits and vegetables daily.She consumes 0 sweetened beverage(s) daily.She exercises with enough effort to increase her heart rate 10 to 19 minutes per day.  She exercises with enough effort to increase her heart rate 3 or less days per week.   She is taking medications regularly.   "   History of lyme's and mold issues  Naturopath wanting to do labs  Complex health issue- has been to multiple specialists.  Naturopath thinks that Lyme and mold are interacting.     Lymes diagnosis was in 2018.    Naturopath wants to see if she is colonized with something that is causing her food sensitivity and sinus problem    Has already done an allergy consult.              Objective           Vitals:  No vitals were obtained today due to virtual visit.    Physical Exam   GENERAL: alert and no distress  EYES: Eyes grossly normal to inspection.  No discharge or erythema, or obvious scleral/conjunctival abnormalities.  RESP: No audible wheeze, cough, or visible cyanosis.    SKIN: Visible skin clear. No significant rash, abnormal pigmentation or lesions.  NEURO: Cranial nerves grossly intact.  Mentation and speech appropriate for age.  PSYCH: Appropriate affect, tone, and pace of words          Video-Visit Details    Type of service:  Video Visit   Video End Time: 1308  Originating Location (pt. Location): Home    Distant Location (provider location):  Off-site  Platform used for Video Visit: Giovanny  Signed Electronically by: SHAHRIAR Waldrop CNP

## 2024-04-14 SDOH — HEALTH STABILITY: PHYSICAL HEALTH: ON AVERAGE, HOW MANY MINUTES DO YOU ENGAGE IN EXERCISE AT THIS LEVEL?: 40 MIN

## 2024-04-14 SDOH — HEALTH STABILITY: PHYSICAL HEALTH: ON AVERAGE, HOW MANY DAYS PER WEEK DO YOU ENGAGE IN MODERATE TO STRENUOUS EXERCISE (LIKE A BRISK WALK)?: 4 DAYS

## 2024-04-14 ASSESSMENT — SOCIAL DETERMINANTS OF HEALTH (SDOH): HOW OFTEN DO YOU GET TOGETHER WITH FRIENDS OR RELATIVES?: TWICE A WEEK

## 2024-04-15 ENCOUNTER — MYC MEDICAL ADVICE (OUTPATIENT)
Dept: FAMILY MEDICINE | Facility: CLINIC | Age: 74
End: 2024-04-15

## 2024-04-15 ENCOUNTER — OFFICE VISIT (OUTPATIENT)
Dept: FAMILY MEDICINE | Facility: CLINIC | Age: 74
End: 2024-04-15
Payer: COMMERCIAL

## 2024-04-15 VITALS
HEART RATE: 66 BPM | TEMPERATURE: 97.4 F | SYSTOLIC BLOOD PRESSURE: 98 MMHG | RESPIRATION RATE: 16 BRPM | DIASTOLIC BLOOD PRESSURE: 63 MMHG | BODY MASS INDEX: 25.73 KG/M2 | OXYGEN SATURATION: 96 % | WEIGHT: 150.7 LBS | HEIGHT: 64 IN

## 2024-04-15 DIAGNOSIS — E03.9 ACQUIRED HYPOTHYROIDISM: ICD-10-CM

## 2024-04-15 DIAGNOSIS — R10.2 VAGINAL PAIN: ICD-10-CM

## 2024-04-15 DIAGNOSIS — I48.0 PAROXYSMAL ATRIAL FIBRILLATION (H): ICD-10-CM

## 2024-04-15 DIAGNOSIS — R73.03 PREDIABETES: ICD-10-CM

## 2024-04-15 DIAGNOSIS — R10.2 VAGINAL PAIN: Primary | ICD-10-CM

## 2024-04-15 LAB
CLUE CELLS: ABNORMAL
TRICHOMONAS, WET PREP: ABNORMAL
WBC'S/HIGH POWER FIELD, WET PREP: ABNORMAL
YEAST, WET PREP: ABNORMAL

## 2024-04-15 PROCEDURE — 87210 SMEAR WET MOUNT SALINE/INK: CPT | Performed by: FAMILY MEDICINE

## 2024-04-15 PROCEDURE — 99214 OFFICE O/P EST MOD 30 MIN: CPT | Performed by: FAMILY MEDICINE

## 2024-04-15 RX ORDER — NALTREXONE HYDROCHLORIDE 50 MG/1
3 TABLET, FILM COATED ORAL DAILY
COMMUNITY

## 2024-04-15 RX ORDER — ESTRADIOL 0.1 MG/G
2 CREAM VAGINAL DAILY
Qty: 42.5 G | Refills: 3 | Status: SHIPPED | OUTPATIENT
Start: 2024-04-15 | End: 2024-04-23

## 2024-04-15 ASSESSMENT — ANXIETY QUESTIONNAIRES
GAD7 TOTAL SCORE: 2
GAD7 TOTAL SCORE: 2
3. WORRYING TOO MUCH ABOUT DIFFERENT THINGS: NOT AT ALL
2. NOT BEING ABLE TO STOP OR CONTROL WORRYING: SEVERAL DAYS
IF YOU CHECKED OFF ANY PROBLEMS ON THIS QUESTIONNAIRE, HOW DIFFICULT HAVE THESE PROBLEMS MADE IT FOR YOU TO DO YOUR WORK, TAKE CARE OF THINGS AT HOME, OR GET ALONG WITH OTHER PEOPLE: SOMEWHAT DIFFICULT
6. BECOMING EASILY ANNOYED OR IRRITABLE: NOT AT ALL
8. IF YOU CHECKED OFF ANY PROBLEMS, HOW DIFFICULT HAVE THESE MADE IT FOR YOU TO DO YOUR WORK, TAKE CARE OF THINGS AT HOME, OR GET ALONG WITH OTHER PEOPLE?: SOMEWHAT DIFFICULT
5. BEING SO RESTLESS THAT IT IS HARD TO SIT STILL: NOT AT ALL
7. FEELING AFRAID AS IF SOMETHING AWFUL MIGHT HAPPEN: NOT AT ALL
4. TROUBLE RELAXING: SEVERAL DAYS
1. FEELING NERVOUS, ANXIOUS, OR ON EDGE: NOT AT ALL
7. FEELING AFRAID AS IF SOMETHING AWFUL MIGHT HAPPEN: NOT AT ALL
GAD7 TOTAL SCORE: 2

## 2024-04-15 ASSESSMENT — PATIENT HEALTH QUESTIONNAIRE - PHQ9
SUM OF ALL RESPONSES TO PHQ QUESTIONS 1-9: 4
SUM OF ALL RESPONSES TO PHQ QUESTIONS 1-9: 4
10. IF YOU CHECKED OFF ANY PROBLEMS, HOW DIFFICULT HAVE THESE PROBLEMS MADE IT FOR YOU TO DO YOUR WORK, TAKE CARE OF THINGS AT HOME, OR GET ALONG WITH OTHER PEOPLE: SOMEWHAT DIFFICULT

## 2024-04-15 ASSESSMENT — PAIN SCALES - GENERAL: PAINLEVEL: NO PAIN (0)

## 2024-04-15 NOTE — RESULT ENCOUNTER NOTE
Hello,    No signs of yeast or bacterial vaginosis however I do feel that your vaginal pain is likely due to vaginal atrophy and dry changes to the skin in this area.  I did send in vaginal estrogen to use on a regular basis I think that this should help.    Alfreda Montgomery MD

## 2024-04-15 NOTE — PATIENT INSTRUCTIONS
Jamila Guzman         Consider:    Pneumonia vaccine    Shingles vaccine - 2  6  month apart    RSV vaccine      See me in [place] for wellness exam - lipids and A1C    OBGYN:  [facility] is great as are the [facility] women's specialist

## 2024-04-15 NOTE — PROGRESS NOTES
Preventive Care Visit  [facility]  Alfreda Montgomery MD, Family Medicine  Apr 15, 2024  {Provider  Link to SmartSet :868321}    {PROVIDER CHARTING PREFERENCE:022496}    Blaze Diana is a 74 year old, presenting for the following:  Physical (AWV)    {(!) Visit Details have not yet been documented.  Please enter Visit Details and then use this list to pull in documentation. (Optional):834738}  {ROOMER if patient is in their first year of Medicare a vision screen is required click here to document the Vison screen and then refresh the note to pull in results  :819828}    Health Care Directive  Patient does not have a Health Care Directive or Living Will: {ADVANCE_DIRECTIVE_STATUS:673889}    HPI  ***  {MA/LPN/RN Pre-Provider Visit Orders- hCG/UA/Strep (Optional):757069}  {SUPERLIST (Optional):796535}  {additonal problems for provider to add (Optional):720410}      4/14/2024   General Health   How would you rate your overall physical health? Good   Feel stress (tense, anxious, or unable to sleep) Only a little   (!) STRESS CONCERN      4/14/2024   Nutrition   Diet: Carbohydrate counting    Gluten-free/reduced    Other   If other, please elaborate: No dairy         4/14/2024   Exercise   Days per week of moderate/strenous exercise 4 days   Average minutes spent exercising at this level 40 min         4/14/2024   Social Factors   Frequency of gathering with friends or relatives Twice a week   Worry food won't last until get money to buy more No   Food not last or not have enough money for food? No   Do you have housing?  Yes   Are you worried about losing your housing? No   Lack of transportation? No   Unable to get utilities (heat,electricity)? No         4/14/2024   Fall Risk   Fallen 2 or more times in the past year? No   Trouble with walking or balance? No          4/14/2024   Activities of Daily Living- Home Safety   Needs help with the following daily activites None of the above    Safety concerns in the home None of the above         2024   Dental   Dentist two times every year? Yes         2024   Hearing Screening   Hearing concerns? None of the above         2024   Driving Risk Screening   Patient/family members have concerns about driving No         2024   General Alertness/Fatigue Screening   Have you been more tired than usual lately? (!) YES         2024   Urinary Incontinence Screening   Bothered by leaking urine in past 6 months No         2024   TB Screening   Were you born outside of the [place]? No       Today's PHQ-9 Score:       4/15/2024     1:05 PM   PHQ-9 SCORE   PHQ-9 Total Score MyChart 4 (Minimal depression)   PHQ-9 Total Score 4         2024   Substance Use   Alcohol more than 3/day or more than 7/wk Not Applicable   Do you have a current opioid prescription? No   How severe/bad is pain from 1 to 10? 0/10 (No Pain)   Do you use any other substances recreationally? (!) BATH SALTS     Social History     Tobacco Use    Smoking status: Former     Current packs/day: 0.00     Average packs/day: 0.1 packs/day for 2.6 years (0.3 ttl pk-yrs)     Types: Cigarettes     Start date: 10/1/1978     Quit date: 5/15/1981     Years since quittin.9     Passive exposure: Past    Smokeless tobacco: Never    Tobacco comments:     in 20's and 30's   Vaping Use    Vaping status: Never Used   Substance Use Topics    Alcohol use: Not Currently    Drug use: No     {Provider  If there are gaps in the social history shown above, please follow the link to update and then refresh the note Link to Social and Substance History :481836}      10/13/2023   LAST FHS-7 RESULTS   1st degree relative breast or ovarian cancer Yes   Any relative bilateral breast cancer No   Any male have breast cancer No   Any ONE woman have BOTH breast AND ovarian cancer No   Any woman with breast cancer before 50yrs No   2 or more relatives with breast AND/OR ovarian cancer No   2 or more  relatives with breast AND/OR bowel cancer No     {If any of the questions to the FHS7 are answered yes, consider referral for genetic counseling.    Additional indications for genetic referral include personal history of breast or ovarian cancer, genetic mutation in 1st degree relative which increases risk of breast cancer including BRCA1, BRCA2, KEIKO, PALB 2, TP53, CHEK2, PTEN, CDH1, STK11 (per ACS) and/or 1st degree relative with history of pancreatic or high-risk prostate cancer (per NCCN):799225}   {Mammogram Decision Support (Optional):579335}    ASCVD Risk   The 10-year ASCVD risk score (Chance IVERSON, et al., 2019) is: 8.4%    Values used to calculate the score:      Age: 74 years      Sex: Female      Is Non- : No      Diabetic: No      Tobacco smoker: No      Systolic Blood Pressure: 102 mmHg      Is BP treated: No      HDL Cholesterol: 70 mg/dL      Total Cholesterol: 195 mg/dL    {Link to Fracture Risk Assessment Tool (Optional):347304}    {Provider  Use the storyboard to review patient history, after sections have been marked as reviewed, refresh note to capture documentation:720848}  {Provider   REQUIRED AWV use this link to review and update sexual activity history  after section has been marked as reviewed, refresh note to capture documentation:339613}  Reviewed and updated as needed this visit by Provider                    {HISTORY OPTIONS (Optional):894493}  Current providers sharing in care for this patient include:  Patient Care Team:  Melissa Walsh MD as PCP - General (Family Practice)  Lizabeth Diez PsyD as Assigned Behavioral Health Provider  Teresa Cortez MD as Assigned PCP    The following health maintenance items are reviewed in Epic and correct as of today:  Health Maintenance   Topic Date Due    Pneumococcal Vaccine: 65+ Years (1 of 2 - PCV) Never done    ZOSTER IMMUNIZATION (1 of 2) Never done    RSV VACCINE (Pregnancy & 60+) (1 -  "1-dose 60+ series) Never done    MEDICARE ANNUAL WELLNESS VISIT  08/18/2024    TSH W/FREE T4 REFLEX  08/18/2024    ANNUAL REVIEW OF HM ORDERS  08/18/2024    PHQ-9  10/15/2024    FALL RISK ASSESSMENT  04/15/2025    COLORECTAL CANCER SCREENING  08/11/2025    MAMMO SCREENING  10/13/2025    GLUCOSE  08/18/2026    LIPID  08/18/2028    ADVANCE CARE PLANNING  08/20/2028    DEXA  01/21/2029    DTAP/TDAP/TD IMMUNIZATION (3 - Td or Tdap) 02/03/2030    HEPATITIS C SCREENING  Completed    DEPRESSION ACTION PLAN  Completed    INFLUENZA VACCINE  Completed    COVID-19 Vaccine  Completed    IPV IMMUNIZATION  Aged Out    HPV IMMUNIZATION  Aged Out    MENINGITIS IMMUNIZATION  Aged Out    RSV MONOCLONAL ANTIBODY  Aged Out       {ROS Picklists (Optional):118971}     Objective    Exam  LMP  (LMP Unknown)    Estimated body mass index is 25.65 kg/m  as calculated from the following:    Height as of 12/22/23: 1.632 m (5' 4.25\").    Weight as of 12/22/23: 68.3 kg (150 lb 9.6 oz).    Physical Exam  {Exam Choices (Optional):826520}  {Provider  The Mini-Cog is incomplete, use link to complete and refresh note Link to Mini-Cog :678922}       No data to display              {A Mini-Cog total score of 0-2 suggests the possibility of dementia, score of 3-5 suggests no dementia:044425}         Signed Electronically by: Alfreda Montgomery MD  {Email feedback regarding this note to [email or web address]   :911143}  Answers submitted by the patient for this visit:  Patient Health Questionnaire (Submitted on 4/15/2024)  If you checked off any problems, how difficult have these problems made it for you to do your work, take care of things at home, or get along with other people?: Somewhat difficult  PHQ9 TOTAL SCORE: 4  KARINA-7 (Submitted on 4/15/2024)  KARINA 7 TOTAL SCORE: 2    "

## 2024-04-15 NOTE — PROGRESS NOTES
"  Assessment & Plan     (R10.2) Vaginal pain  (primary encounter diagnosis)  Comment: No signs of yeast or bacterial vaginosis I think the patient is experiencing vaginal dryness and atrophy will recommend estradiol cream  Plan: Wet prep - Clinic Collect            (R73.03) Prediabetes  Comment: Prediabetes noted on recent labs patient has not yet seen diabetes education discussed importance of this and that this can also affect energy  Plan: Adult Diabetes Education  Referral            (I48.0) Paroxysmal atrial fibrillation (H)  Comment: This is stable patient has no concerns is on blood thinners with DOAC  Plan:     (E03.9) Acquired hypothyroidism  Comment: Has Cytomel and Synthroid patient has been taking recent TSH was at goal repeat 1 year from last check  Plan:       Prescription drug management        BMI  Estimated body mass index is 25.67 kg/m  as calculated from the following:    Height as of this encounter: 1.632 m (5' 4.25\").    Weight as of this encounter: 68.4 kg (150 lb 11.2 oz).       Counseling  Appropriate preventive services were discussed with this patient, including applicable screening as appropriate for fall prevention, nutrition, physical activity, Tobacco-use cessation, weight loss and cognition.  Checklist reviewing preventive services available has been given to the patient.  Reviewed patient's diet, addressing concerns and/or questions.   She is at risk for psychosocial distress and has been provided with information to reduce risk.   Discussed possible causes of fatigue.     See me for a wellness exam in August Subjective   Adalgisa is a 74 year old, presenting for the following health issues:  Establish Care    HPI     Patient is here to establish care is not due for a wellness exam currently    1) P a fib - on eliquis has echo yearly  This is stable    2) Thyroid:  Removed in 2006  Has been on replacement  Has been on 137 mcg replacement and taking 5 mcg cytomel    3) " Lyme infection:  Contracted this in 2018 possibly sooner  Had fevers and typical initial symptoms.  Started seeing a specialist  A few times pe year she gets very tired  Very sensitive to sinus congestion  Tends to get headaches  Had tests run though another provider looking for colonization of sinus  Has not seen ENT in a long time  Concerned about mold and sinus infection  Has had chronic fatigue and sinus illness  Sees naturopath  [place] [facility]    4) prediabetes:  Has slightly elevated A1C of 5.7  Follows low-carb diet not due for recheck yet    5) Stable anxiety situational:  Uses ativan for flights only      Vaginal discomfort wonders about seeing an OB/GYN has not had any evaluation for this.  Would like to evaluate this today.              4/14/2024   General Health   How would you rate your overall physical health? Good   Feel stress (tense, anxious, or unable to sleep) Only a little   (!) STRESS CONCERN      4/14/2024   Nutrition   Diet: Carbohydrate counting    Gluten-free/reduced    Other   If other, please elaborate: No dairy         4/14/2024   Exercise   Days per week of moderate/strenous exercise 4 days   Average minutes spent exercising at this level 40 min         4/14/2024   Social Factors   Frequency of gathering with friends or relatives Twice a week   Worry food won't last until get money to buy more No   Food not last or not have enough money for food? No   Do you have housing?  Yes   Are you worried about losing your housing? No   Lack of transportation? No   Unable to get utilities (heat,electricity)? No         4/14/2024   Fall Risk   Fallen 2 or more times in the past year? No   Trouble with walking or balance? No          4/14/2024   Activities of Daily Living- Home Safety   Needs help with the following daily activites None of the above   Safety concerns in the home None of the above         4/14/2024   Dental   Dentist two times every year? Yes          "2024   Hearing Screening   Hearing concerns? None of the above         2024   Driving Risk Screening   Patient/family members have concerns about driving No         2024   General Alertness/Fatigue Screening   Have you been more tired than usual lately? (!) YES         2024   Urinary Incontinence Screening   Bothered by leaking urine in past 6 months No         2024   TB Screening   Were you born outside of the [place]? No       Today's PHQ-9 Score:       4/15/2024     1:05 PM   PHQ-9 SCORE   PHQ-9 Total Score MyChart 4 (Minimal depression)   PHQ-9 Total Score 4         2024   Substance Use   Alcohol more than 3/day or more than 7/wk Not Applicable   Do you have a current opioid prescription? No   How severe/bad is pain from 1 to 10? 0/10 (No Pain)   Do you use any other substances recreationally? (!) BATH SALTS     Social History     Tobacco Use    Smoking status: Former     Current packs/day: 0.00     Average packs/day: 0.1 packs/day for 2.6 years (0.3 ttl pk-yrs)     Types: Cigarettes     Start date: 10/1/1978     Quit date: 5/15/1981     Years since quittin.9     Passive exposure: Past    Smokeless tobacco: Never    Tobacco comments:     in 20's and 30's   Vaping Use    Vaping status: Never Used   Substance Use Topics    Alcohol use: Not Currently    Drug use: No       Review of Systems  Constitutional, HEENT, cardiovascular, pulmonary, gi and gu systems are negative, except as otherwise noted.      Objective    BP 98/63   Pulse 66   Temp 97.4  F (36.3  C) (Temporal)   Resp 16   Ht 1.632 m (5' 4.25\")   Wt 68.4 kg (150 lb 11.2 oz)   LMP  (LMP Unknown)   SpO2 96%   BMI 25.67 kg/m    Body mass index is 25.67 kg/m .  Physical Exam   GENERAL: alert and no distress   (female) w/bimanual: Vaginal dryness and atrophy noted vaginal external os narrowed and stiff.  Tenderness with bimanual examination no masses felt    Results for orders placed or performed in visit on 04/15/24 " (from the past 24 hour(s))   Wet prep - Clinic Collect    Specimen: Vagina; Swab   Result Value Ref Range    Trichomonas Absent Absent    Yeast Absent Absent    Clue Cells Absent Absent    WBCs/high power field 2+ (A) None           Signed Electronically by: Alfreda Montgomery MD    Answers submitted by the patient for this visit:  Patient Health Questionnaire (Submitted on 4/15/2024)  If you checked off any problems, how difficult have these problems made it for you to do your work, take care of things at home, or get along with other people?: Somewhat difficult  PHQ9 TOTAL SCORE: 4  KARINA-7 (Submitted on 4/15/2024)  KARINA 7 TOTAL SCORE: 2

## 2024-04-26 DIAGNOSIS — R10.2 VAGINAL PAIN: ICD-10-CM

## 2024-04-26 RX ORDER — ESTRADIOL 0.1 MG/G
2 CREAM VAGINAL DAILY
Qty: 42.5 G | Refills: 3 | Status: SHIPPED | OUTPATIENT
Start: 2024-04-26 | End: 2024-04-29

## 2024-04-29 RX ORDER — ESTRADIOL 0.1 MG/G
CREAM VAGINAL
Qty: 42.5 G | Refills: 3 | Status: SHIPPED | OUTPATIENT
Start: 2024-04-29

## 2024-05-22 ENCOUNTER — MYC MEDICAL ADVICE (OUTPATIENT)
Dept: FAMILY MEDICINE | Facility: CLINIC | Age: 74
End: 2024-05-22
Payer: COMMERCIAL

## 2024-05-22 DIAGNOSIS — G89.29 CHRONIC LOW BACK PAIN, UNSPECIFIED BACK PAIN LATERALITY, UNSPECIFIED WHETHER SCIATICA PRESENT: Primary | ICD-10-CM

## 2024-05-22 DIAGNOSIS — M54.50 CHRONIC LOW BACK PAIN, UNSPECIFIED BACK PAIN LATERALITY, UNSPECIFIED WHETHER SCIATICA PRESENT: Primary | ICD-10-CM

## 2024-05-22 RX ORDER — METHYLPREDNISOLONE 4 MG
TABLET, DOSE PACK ORAL
Qty: 21 TABLET | Refills: 0 | Status: SHIPPED | OUTPATIENT
Start: 2024-05-22

## 2024-05-22 NOTE — TELEPHONE ENCOUNTER
SN,  Please see below [employer] message and advise.  Last OV 04/2024  Pended referral if approved - with patient's preferred location entered  Unsure if online referral would be needed  Thanks,  Kari LAEN RN

## 2024-05-23 ENCOUNTER — TRANSFERRED RECORDS (OUTPATIENT)
Dept: HEALTH INFORMATION MANAGEMENT | Facility: CLINIC | Age: 74
End: 2024-05-23
Payer: COMMERCIAL

## 2024-08-09 ENCOUNTER — OFFICE VISIT (OUTPATIENT)
Dept: URGENT CARE | Facility: URGENT CARE | Age: 74
End: 2024-08-09
Payer: COMMERCIAL

## 2024-08-09 VITALS
RESPIRATION RATE: 16 BRPM | OXYGEN SATURATION: 97 % | TEMPERATURE: 97.3 F | BODY MASS INDEX: 25.72 KG/M2 | HEART RATE: 76 BPM | SYSTOLIC BLOOD PRESSURE: 97 MMHG | DIASTOLIC BLOOD PRESSURE: 58 MMHG | WEIGHT: 151 LBS

## 2024-08-09 DIAGNOSIS — R20.0 NUMBNESS AND TINGLING SENSATION OF SKIN: Primary | ICD-10-CM

## 2024-08-09 DIAGNOSIS — R20.2 NUMBNESS AND TINGLING SENSATION OF SKIN: Primary | ICD-10-CM

## 2024-08-09 PROCEDURE — 99213 OFFICE O/P EST LOW 20 MIN: CPT | Performed by: PHYSICIAN ASSISTANT

## 2024-08-09 NOTE — PROGRESS NOTES
SUBJECTIVE:  Adalgisa Dennis is a 74 year old female who presents to the clinic today for senseof numbness on left posterior scalp for 45 moinutes this morning.  Had similar episode last night for a few hours.  Degree of discomfort 4/10. Denies rash, severe headache, neuro deficit.  Some mild nausea she attributes to a large amount of fruit she ate. Is on eliquis. Symptom is currently resolved.     Past Medical History:   Diagnosis Date    Abnormal glandular Papanicolaou smear of cervix 2002    yearly paps since 2003    Atrial fibrillation, unspecified type (H) 2005    Cyst of thyroid     Depressive disorder     Depressive disorder, not elsewhere classified     Genital herpes, unspecified     Leiomyoma of uterus, unspecified     Lyme disease     Uncomplicated asthma 2005     Current Outpatient Medications   Medication Sig Dispense Refill    ascorbic acid (VITAMIN C) 1000 MG TABS Take 3,000 mg by mouth three times a week       ELIQUIS ANTICOAGULANT 5 MG tablet Take 1 tablet (5 mg) by mouth 2 times daily 180 tablet 3    estradiol (ESTRACE) 0.1 MG/GM vaginal cream Start with daily vaginal use of 2 g for 2 weeks then use this twice weekly thereafter 42.5 g 3    levothyroxine (SYNTHROID/LEVOTHROID) 137 MCG tablet Take 1 tablet (137 mcg) by mouth daily 30 minutes prior to breakfast. 90 tablet 4    liothyronine (CYTOMEL) 5 MCG tablet TAKE 2 TABLETS (10 MCG) BY MOUTH DAILY ON AN EMPTY STOMACH 180 tablet 4    LORazepam (ATIVAN) 0.5 MG tablet TAKE 1 TABLET (0.5 MG TOTAL) BY MOUTH DAILY AS NEEDED FOR ANXIETY. 30 tablet 0    melatonin 3 MG tablet Take 3 mg by mouth      methylPREDNISolone (MEDROL DOSEPAK) 4 MG tablet therapy pack Follow Package Directions 21 tablet 0    naltrexone (DEPADE/REVIA) 50 MG tablet Take 3 mg by mouth daily Low dose - taking 3 mg      Omega-3 1000 MG capsule Take 1 g by mouth daily      propranolol (INDERAL) 20 MG tablet TAKE 1 TABLET (20 MG) BY MOUTH ONCE DAILY AS NEEDED 50 tablet 1    UNABLE TO  FIND Alpha lopic acid 400 m mg po daily. qucertin 500 mg po daily. Collagen 6000 mg po daily. Zinc 16 mg po daily. Patient changes her Zinc on occasion.  Taurine 500 mg po 3 times a week. Lithium 4.5 m tablet po daily.  Wholemune (Bakers Yeast 250 mg and Larch Arabinogalactan 85 mg) 1 dose po every other week. resveritral 600 mg po daily. L-serine 500 m mg po daily. minerx magense 1 tablet po daily. GI detox: 12 capsules (6000 mg total) po daily Sodium butyrate 1200 mg po daily. NAC 1.2 grams po daily. Reacted Magnesium 235 mg po daily.      UNABLE TO FIND Twice per week Probiotic      vitamin D3 (CHOLECALCIFEROL) 125 MCG (5000 UT) tablet Take 5,000 Units by mouth       Social History     Tobacco Use    Smoking status: Former     Current packs/day: 0.00     Average packs/day: 0.1 packs/day for 2.6 years (0.3 ttl pk-yrs)     Types: Cigarettes     Start date: 10/1/1978     Quit date: 5/15/1981     Years since quittin.2     Passive exposure: Past    Smokeless tobacco: Never    Tobacco comments:     in 20's and 30's   Substance Use Topics    Alcohol use: Not Currently       ROS:  Review of systems negative except as stated above.    EXAM:   BP 97/58   Pulse 76   Temp 97.3  F (36.3  C) (Temporal)   Resp 16   Wt 68.5 kg (151 lb)   LMP  (LMP Unknown)   SpO2 97%   BMI 25.72 kg/m    GENERAL: alert, no acute distress.  SKIN: WNL  GENERAL APPEARANCE: healthy, alert and no distress  EYES: EOMI,  PERRL, conjunctiva clear  RESP: lungs clear to auscultation - no rales, rhonchi or wheezes  CV: regular rates and rhythm, normal S1 S2, no murmur noted  NEURO: CN 2-12 intact. Normal strength and tone, sensory exam grossly normal,  normal speech and mentation    ASSESSMENT:  (R20.0,  R20.2) Numbness and tingling sensation of skin  (primary encounter diagnosis)  Comment: resolved at this time  Plan: monitor and follow up with changes   Red flags and emergent follow up discussed, and understood by  patient  Follow up with PCP if symptoms worsen or fail to improve

## 2024-09-23 NOTE — LETTER
Letter by Melissa Walsh MD at      Author: Melissa Walsh MD Service: -- Author Type: --    Filed:  Encounter Date: 5/1/2019 Status: (Other)         [facility] FAMILY MEDICINE/OB  05/01/19    Patient: Adalgisa Dennis  YOB: 1950  Medical Record Number: [id number]  CSN: [id number]                                                                              Non-opioid Controlled Substance Agreement    I understand that my care provider has prescribed a controlled substance to help manage my condition(s). I am taking this medicine to help me function or work. I know this is strong medicine, and that it can cause serious side effects. Controlled substances can be sedating, addicting and may cause a dependency on the drug. They can affect my ability to drive or think, and cause depression. They need to be taken exactly as prescribed. Combining controlled substances with certain medicines or chemicals (such as cocaine, sedatives and tranquilizers, sleeping pills, meth) can be dangerous or even fatal. Also, if I stop controlled substances suddenly, I may have severe withdrawal symptoms.  If not helpful, I may be asked to stop them.    The risks, benefits, and side effects of these medicine(s) were explained to me. I agree that:    1. I will take part in other treatments as advised by my care team. This may be psychiatry or counseling, physical therapy, behavioral therapy, group treatment or a referral to a pain clinic. I will reduce or stop my medicine when my care team tells me to do so.  2. I will take my medicines as prescribed. I will not change the dose or schedule unless my care team tells me to. There will be no refills if I run out early.  I may be contactedwithout warning and asked to complete a urine drug test or pill count at any time.   3. I will keep all my appointments, and understand this is part of the monitoring of controlled substances. My care team may require  an office visit for EVERY controlled substance refill. If I miss appointments or dont follow instructions, my care team may stop my medicine.  4. I will not ask other providers to prescribe controlled substances, and I will not accept controlled substances from other people. If I need another prescribed controlled substance for a new reason, I will tell my care team within 1 business day.  5. I will use one pharmacy to fill all of my controlled substance prescriptions, and it is up to me to make sure that I do not run out of my medicines on weekends or holidays. If my care team is willing to refill my controlled substance prescription without a visit, I must request refills only during office hours, refills may take up to 3 days to process, and it may take up to 5 to 7 days for my medicine to be mailed and ready at my pharmacy. Prescriptions will not be mailed anywhere except my pharmacy.    6. I am responsible for my prescriptions. If the medicine/prescription is lost or stolen, it will not be replaced. I also agree not to share controlled substance medicines with anyone.          [facility] [place] FAMILY MEDICINE/OB  05/01/19  Patient:  Adalgisa Dennis  YOB: 1950  Medical Record Number: [id number]  CSN: [id number]    7. I agree to not use ANY illegal or recreational drugs. This includes marijuana, cocaine, bath salts or other drugs. I agree not to use alcohol unless my care team says I may. I agree to give urine samples whenever asked. If I dont give a urine sample, the care team may stop my medicine.    8. If I enroll in the [place] Medical Marijuana program, I will tell my care team. I will also sign an agreement to share my medical records with my care team.    9. I will bring in my list of medicines (or my medicine bottles) each time I come to the clinic.   10. I will tell my care team right away if I become pregnant or have a new medical problem treated outside of my regular clinic.  11. I  understand that this medicine can affect my thinking and judgment. It may be unsafe for me to drive, use machinery and do dangerous tasks. I will not do any of these things until I know how the medicine affects me. If my dose changes, I will wait to see how it affects me. I will contact my care team if I have concerns about medicine side effects.    I understand that if I do not follow any of the conditions above, my prescriptions or treatment may be stopped.      I agree that my provider, clinic care team, and pharmacy may work with any city, state or federal law enforcement agency that investigates the misuse, sale, or other diversion of my controlled medicine. I will allow my provider to discuss my care with or share a copy of this agreement with any other treating provider, pharmacy or emergency room where I receive care. I agree to give up (waive) any right of privacy or confidentiality with respect to these consents.   I have read this agreement and have asked questions about anything I did not understand.    Lorazepam 0.5 mg as needed daily for flying/ anxiety.  #30 tabs for 4 months.  Visits every 6 months.    ___________________________________________________________________________  Patient signature - Date/Time  -Adalgisa Dennis                                      ___________________________________________________________________________  Witness signature                                                                    ___________________________________________________________________________  Provider signature- Melissa Walsh MD            Defer fluid needs to MD/Team.   Ideal Body Weight: 130lbs / 58.9kg +/-10%. IBW used to calculate nutritional estimated needs.   [facility]: 69.4kg (Oct, 2018), 77.1kg (9/15/24). No recent weight hx. Obtain weekly weights to monitor weight trend.

## 2024-10-16 SDOH — HEALTH STABILITY: PHYSICAL HEALTH: ON AVERAGE, HOW MANY MINUTES DO YOU ENGAGE IN EXERCISE AT THIS LEVEL?: 30 MIN

## 2024-10-16 SDOH — HEALTH STABILITY: PHYSICAL HEALTH: ON AVERAGE, HOW MANY DAYS PER WEEK DO YOU ENGAGE IN MODERATE TO STRENUOUS EXERCISE (LIKE A BRISK WALK)?: 4 DAYS

## 2024-10-16 ASSESSMENT — SOCIAL DETERMINANTS OF HEALTH (SDOH): HOW OFTEN DO YOU GET TOGETHER WITH FRIENDS OR RELATIVES?: TWICE A WEEK

## 2024-10-20 ENCOUNTER — HEALTH MAINTENANCE LETTER (OUTPATIENT)
Age: 74
End: 2024-10-20

## 2024-10-21 ENCOUNTER — OFFICE VISIT (OUTPATIENT)
Dept: FAMILY MEDICINE | Facility: CLINIC | Age: 74
End: 2024-10-21
Payer: COMMERCIAL

## 2024-10-21 VITALS
OXYGEN SATURATION: 95 % | RESPIRATION RATE: 16 BRPM | HEART RATE: 72 BPM | WEIGHT: 155.9 LBS | TEMPERATURE: 96.8 F | BODY MASS INDEX: 26.61 KG/M2 | DIASTOLIC BLOOD PRESSURE: 68 MMHG | HEIGHT: 64 IN | SYSTOLIC BLOOD PRESSURE: 101 MMHG

## 2024-10-21 DIAGNOSIS — R32 URINARY INCONTINENCE, UNSPECIFIED TYPE: ICD-10-CM

## 2024-10-21 DIAGNOSIS — F40.243 FLYING PHOBIA: ICD-10-CM

## 2024-10-21 DIAGNOSIS — R79.9 ABNORMAL FINDING OF BLOOD CHEMISTRY, UNSPECIFIED: ICD-10-CM

## 2024-10-21 DIAGNOSIS — I48.0 PAROXYSMAL ATRIAL FIBRILLATION (H): ICD-10-CM

## 2024-10-21 DIAGNOSIS — E55.9 VITAMIN D DEFICIENCY, UNSPECIFIED: ICD-10-CM

## 2024-10-21 DIAGNOSIS — R10.2 VAGINAL PAIN: ICD-10-CM

## 2024-10-21 DIAGNOSIS — E03.9 ACQUIRED HYPOTHYROIDISM: ICD-10-CM

## 2024-10-21 DIAGNOSIS — R53.83 FATIGUE, UNSPECIFIED TYPE: ICD-10-CM

## 2024-10-21 DIAGNOSIS — Z00.00 ENCOUNTER FOR MEDICARE ANNUAL WELLNESS EXAM: Primary | ICD-10-CM

## 2024-10-21 DIAGNOSIS — E28.39 ESTROGEN DEFICIENCY: ICD-10-CM

## 2024-10-21 DIAGNOSIS — Z78.0 ASYMPTOMATIC MENOPAUSAL STATE: ICD-10-CM

## 2024-10-21 LAB
ALBUMIN SERPL BCG-MCNC: 4.3 G/DL (ref 3.5–5.2)
ALP SERPL-CCNC: 69 U/L (ref 40–150)
ALT SERPL W P-5'-P-CCNC: 19 U/L (ref 0–50)
ANION GAP SERPL CALCULATED.3IONS-SCNC: 12 MMOL/L (ref 7–15)
AST SERPL W P-5'-P-CCNC: 26 U/L (ref 0–45)
BASOPHILS # BLD AUTO: 0 10E3/UL (ref 0–0.2)
BASOPHILS NFR BLD AUTO: 0 %
BILIRUB SERPL-MCNC: 0.3 MG/DL
BUN SERPL-MCNC: 34.7 MG/DL (ref 8–23)
CALCIUM SERPL-MCNC: 8.8 MG/DL (ref 8.8–10.4)
CHLORIDE SERPL-SCNC: 103 MMOL/L (ref 98–107)
CHOLEST SERPL-MCNC: 212 MG/DL
CREAT SERPL-MCNC: 0.88 MG/DL (ref 0.51–0.95)
CRP SERPL HS-MCNC: <0.15 MG/L
EGFRCR SERPLBLD CKD-EPI 2021: 69 ML/MIN/1.73M2
EOSINOPHIL # BLD AUTO: 0.2 10E3/UL (ref 0–0.7)
EOSINOPHIL NFR BLD AUTO: 2 %
ERYTHROCYTE [DISTWIDTH] IN BLOOD BY AUTOMATED COUNT: 13.5 % (ref 10–15)
EST. AVERAGE GLUCOSE BLD GHB EST-MCNC: 117 MG/DL
FASTING STATUS PATIENT QL REPORTED: NO
FASTING STATUS PATIENT QL REPORTED: NO
FERRITIN SERPL-MCNC: 90 NG/ML (ref 11–328)
FIBRINOGEN PPP-MCNC: 312 MG/DL (ref 170–510)
GLUCOSE SERPL-MCNC: 102 MG/DL (ref 70–99)
HBA1C MFR BLD: 5.7 % (ref 0–5.6)
HCO3 SERPL-SCNC: 23 MMOL/L (ref 22–29)
HCT VFR BLD AUTO: 38.9 % (ref 35–47)
HCYS SERPL-SCNC: 11 UMOL/L (ref 0–15)
HDLC SERPL-MCNC: 80 MG/DL
HGB BLD-MCNC: 12.8 G/DL (ref 11.7–15.7)
IMM GRANULOCYTES # BLD: 0 10E3/UL
IMM GRANULOCYTES NFR BLD: 0 %
IRON BINDING CAPACITY (ROCHE): 241 UG/DL (ref 240–430)
IRON SATN MFR SERPL: 42 % (ref 15–46)
IRON SERPL-MCNC: 101 UG/DL (ref 37–145)
LDLC SERPL CALC-MCNC: 114 MG/DL
LYMPHOCYTES # BLD AUTO: 5 10E3/UL (ref 0.8–5.3)
LYMPHOCYTES NFR BLD AUTO: 46 %
MCH RBC QN AUTO: 30 PG (ref 26.5–33)
MCHC RBC AUTO-ENTMCNC: 32.9 G/DL (ref 31.5–36.5)
MCV RBC AUTO: 91 FL (ref 78–100)
MONOCYTES # BLD AUTO: 0.8 10E3/UL (ref 0–1.3)
MONOCYTES NFR BLD AUTO: 7 %
NEUTROPHILS # BLD AUTO: 4.9 10E3/UL (ref 1.6–8.3)
NEUTROPHILS NFR BLD AUTO: 44 %
NONHDLC SERPL-MCNC: 132 MG/DL
PLATELET # BLD AUTO: 161 10E3/UL (ref 150–450)
POTASSIUM SERPL-SCNC: 4.4 MMOL/L (ref 3.4–5.3)
PROT SERPL-MCNC: 6.6 G/DL (ref 6.4–8.3)
RBC # BLD AUTO: 4.26 10E6/UL (ref 3.8–5.2)
SODIUM SERPL-SCNC: 138 MMOL/L (ref 135–145)
T3FREE SERPL-MCNC: 3 PG/ML (ref 2–4.4)
T4 FREE SERPL-MCNC: 1.35 NG/DL (ref 0.9–1.7)
TRIGL SERPL-MCNC: 89 MG/DL
TSH SERPL DL<=0.005 MIU/L-ACNC: 0.32 UIU/ML (ref 0.3–4.2)
VIT D+METAB SERPL-MCNC: 54 NG/ML (ref 20–50)
WBC # BLD AUTO: 11 10E3/UL (ref 4–11)

## 2024-10-21 PROCEDURE — 83540 ASSAY OF IRON: CPT | Mod: GZ | Performed by: FAMILY MEDICINE

## 2024-10-21 PROCEDURE — 85384 FIBRINOGEN ACTIVITY: CPT | Performed by: FAMILY MEDICINE

## 2024-10-21 PROCEDURE — 36415 COLL VENOUS BLD VENIPUNCTURE: CPT | Performed by: FAMILY MEDICINE

## 2024-10-21 PROCEDURE — 84443 ASSAY THYROID STIM HORMONE: CPT | Performed by: FAMILY MEDICINE

## 2024-10-21 PROCEDURE — 99214 OFFICE O/P EST MOD 30 MIN: CPT | Mod: 25 | Performed by: FAMILY MEDICINE

## 2024-10-21 PROCEDURE — 82306 VITAMIN D 25 HYDROXY: CPT | Mod: GZ | Performed by: FAMILY MEDICINE

## 2024-10-21 PROCEDURE — 83550 IRON BINDING TEST: CPT | Mod: GZ | Performed by: FAMILY MEDICINE

## 2024-10-21 PROCEDURE — 82728 ASSAY OF FERRITIN: CPT | Mod: GZ | Performed by: FAMILY MEDICINE

## 2024-10-21 PROCEDURE — 83090 ASSAY OF HOMOCYSTEINE: CPT | Performed by: FAMILY MEDICINE

## 2024-10-21 PROCEDURE — G0439 PPPS, SUBSEQ VISIT: HCPCS | Performed by: FAMILY MEDICINE

## 2024-10-21 PROCEDURE — 86141 C-REACTIVE PROTEIN HS: CPT | Performed by: FAMILY MEDICINE

## 2024-10-21 PROCEDURE — 80053 COMPREHEN METABOLIC PANEL: CPT | Performed by: FAMILY MEDICINE

## 2024-10-21 PROCEDURE — 83036 HEMOGLOBIN GLYCOSYLATED A1C: CPT | Performed by: FAMILY MEDICINE

## 2024-10-21 PROCEDURE — 80061 LIPID PANEL: CPT | Performed by: FAMILY MEDICINE

## 2024-10-21 PROCEDURE — 84481 FREE ASSAY (FT-3): CPT | Performed by: FAMILY MEDICINE

## 2024-10-21 PROCEDURE — 84439 ASSAY OF FREE THYROXINE: CPT | Performed by: FAMILY MEDICINE

## 2024-10-21 PROCEDURE — 85025 COMPLETE CBC W/AUTO DIFF WBC: CPT | Performed by: FAMILY MEDICINE

## 2024-10-21 RX ORDER — APIXABAN 5 MG/1
5 TABLET, FILM COATED ORAL 2 TIMES DAILY
Qty: 180 TABLET | Refills: 3 | Status: SHIPPED | OUTPATIENT
Start: 2024-10-21

## 2024-10-21 RX ORDER — LORAZEPAM 0.5 MG/1
TABLET ORAL
Qty: 15 TABLET | Refills: 0 | Status: SHIPPED | OUTPATIENT
Start: 2024-10-21

## 2024-10-21 RX ORDER — ESTRADIOL 0.1 MG/G
CREAM VAGINAL
Qty: 42.5 G | Refills: 3 | Status: SHIPPED | OUTPATIENT
Start: 2024-10-21

## 2024-10-21 RX ORDER — PROPRANOLOL HCL 20 MG
40 TABLET ORAL DAILY PRN
Qty: 60 TABLET | Refills: 1 | Status: SHIPPED | OUTPATIENT
Start: 2024-10-21 | End: 2024-10-23

## 2024-10-21 RX ORDER — LEVOTHYROXINE SODIUM 137 UG/1
137 TABLET ORAL DAILY
Qty: 90 TABLET | Refills: 4 | Status: SHIPPED | OUTPATIENT
Start: 2024-10-21

## 2024-10-21 RX ORDER — LIOTHYRONINE SODIUM 5 UG/1
TABLET ORAL
Qty: 180 TABLET | Refills: 4 | Status: SHIPPED | OUTPATIENT
Start: 2024-10-21

## 2024-10-21 ASSESSMENT — PATIENT HEALTH QUESTIONNAIRE - PHQ9
SUM OF ALL RESPONSES TO PHQ QUESTIONS 1-9: 4
10. IF YOU CHECKED OFF ANY PROBLEMS, HOW DIFFICULT HAVE THESE PROBLEMS MADE IT FOR YOU TO DO YOUR WORK, TAKE CARE OF THINGS AT HOME, OR GET ALONG WITH OTHER PEOPLE: SOMEWHAT DIFFICULT
SUM OF ALL RESPONSES TO PHQ QUESTIONS 1-9: 4

## 2024-10-21 ASSESSMENT — PAIN SCALES - GENERAL: PAINLEVEL: NO PAIN (0)

## 2024-10-21 NOTE — PROGRESS NOTES
Preventive Care Visit  [facility]  Alfreda Montgomery MD, Family Medicine  Oct 21, 2024      Assessment & Plan     (Z00.00) Encounter for Medicare annual wellness exam  (primary encounter diagnosis)  Comment:   Plan: Lipid panel reflex to direct LDL Non-fasting,         CANCELED: Lipid panel reflex to direct LDL         Fasting            (E03.9) Acquired hypothyroidism  Comment:   Plan: TSH WITH FREE T4 REFLEX, levothyroxine         (SYNTHROID/LEVOTHROID) 137 MCG tablet,         liothyronine (CYTOMEL) 5 MCG tablet            (E28.39) Estrogen deficiency  Comment:   Plan: DEXA HIP/PELVIS/SPINE - Future            (Z78.0) Asymptomatic menopausal state  Comment:   Plan: DEXA HIP/PELVIS/SPINE - Future            (R53.83) Fatigue, unspecified type  Comment: Patient has been working with another provider on chronic Lyme issues.  Patient did have Lyme years ago and has had chronic symptoms since then.  Has a list of labs that she would like drawn for this provider it is cheaper to have it done through our office then through theirs.  Plan: CBC with platelets and differential,         Comprehensive metabolic panel (BMP + Alb, Alk         Phos, ALT, AST, Total. Bili, TP), Ferritin,         Iron and iron binding capacity, C-Reactive         Protein, High Sensitivity, TSH, T3, Free, T4,         free, Vitamin D Deficiency, Homocysteine,         Fibrinogen activity, Hemoglobin A1c, Lipid         panel reflex to direct LDL Non-fasting,         CANCELED: Lipid panel reflex to direct LDL         Fasting            (E55.9) Vitamin D deficiency, unspecified  Comment:   Plan: Ferritin, Iron and iron binding capacity,         Vitamin D Deficiency, Hemoglobin A1c            (R79.9) Abnormal finding of blood chemistry, unspecified  Comment:   Plan: Ferritin, Iron and iron binding capacity,         Hemoglobin A1c            (I48.0) Paroxysmal atrial fibrillation (H)  Comment:   Plan: ELIQUIS ANTICOAGULANT 5 MG  "tablet, propranolol         (INDERAL) 20 MG tablet            (R10.2) Vaginal pain  Comment:   Plan: estradiol (ESTRACE) 0.1 MG/GM vaginal cream            (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    Weight as of this encounter: 70.7 kg (155 lb 14.4 oz).       Counseling  Appropriate preventive services were addressed with this patient via screening, questionnaire, or discussion as appropriate for fall prevention, nutrition, physical activity, Tobacco-use cessation, social engagement, weight loss and cognition.  Checklist reviewing preventive services available has been given to the patient.  Reviewed patient's diet, addressing concerns and/or questions.   Discussed possible causes of fatigue. The patient was provided with written information regarding signs of hearing loss.   Information on urinary incontinence and treatment options given to patient.           Blaze Diana is a 74 year old, presenting for the following:  Physical (AWV)      Health Care Directive  Patient does not have a Health Care Directive or Living Will: Discussed advance care planning with patient; information given to patient to review.    [facility]    Wellness Visit Notes:     -Mammogram: Last done 10/13/2023 (impression: normal/benign). Pt scheduled for 10/25/24.  -DEXA: Last done 1/21/2014 (impression: osteopenia). Plan: order placed today . Pt requesting [facility] location  -PAP:  Provider to review PAP recommendations.   -Colon Cancer Screening: Last done via colonoscopy on " 8/11/2020. 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 Due 8/2025.   -Dermatology: Pt verbalized they do not meet with dermatology regularly. .  -Immunizations: Patient is due/able to receive at clinic today: Flu - pt declined today, Covid - pt declined today, and Pneumococcal - pt declined today   Patient is due for the following vaccines: Shingles and RSV. Advised patient to receive at a pharmacy due to Medicare insurance coverage.     Works with another provider and has chronic fatigue present 6 years   Better in the last few years  Had lyme infection in 2018  Feels dietary changes has really helped her fatigue  Has now been avoiding carbs and this helps a lot    History of paroxysmal A-fib and is currently on Eliquis for this.  Tolerating it well is seeing cardiology soon    Hypothyroidism is on levothyroxine 137 mcg and 5 mcg of liothyronine.  Due for recheck of labs today.                10/16/2024   General Health   How would you rate your overall physical health? Good   Feel stress (tense, anxious, or unable to sleep) Rather much      (!) STRESS CONCERN      10/16/2024   Nutrition   Diet: Gluten-free/reduced    Other   If other, please elaborate: dairy allergy - no shrimp       Multiple values from one day are sorted in reverse-chronological order         10/16/2024   Exercise   Days per week of moderate/strenous exercise 4 days   Average minutes spent exercising at this level 30 min            10/16/2024   Social Factors   Frequency of gathering with friends or relatives Twice a week   Worry food won't last until get money to buy more No   Food not last or not have enough money for food? No   Do you have housing? (Housing is defined as stable permanent housing and does not include staying ouside in a car, in a tent, in an abandoned building, in an overnight shelter, or couch-surfing.) Yes   Are you worried about losing your housing? No   Lack of transportation? No   Unable to  get utilities (heat,electricity)? No            10/21/2024   Fall Risk   Gait Speed Test (Document in seconds) 3.2   Gait Speed Test Interpretation Less than or equal to 5.00 seconds - PASS             10/16/2024   Activities of Daily Living- Home Safety   Needs help with the following daily activites None of the above   Safety concerns in the home None of the above            10/16/2024   Dental   Dentist two times every year? Yes            10/16/2024   Hearing Screening   Hearing concerns? (!) IT'S HARD TO FOLLOW A CONVERSATION IN A NOISY RESTAURANT OR CROWDED ROOM.            10/16/2024   Driving Risk Screening   Patient/family members have concerns about driving No            10/16/2024   General Alertness/Fatigue Screening   Have you been more tired than usual lately? (!) YES            10/16/2024   Urinary Incontinence Screening   Bothered by leaking urine in past 6 months Yes            2024   TB Screening   Were you born outside of the [place]? No                    10/16/2024   Substance Use   Alcohol more than 3/day or more than 7/wk Not Applicable   Do you have a current opioid prescription? No   How severe/bad is pain from 1 to 10? 0/10 (No Pain)   Do you use any other substances recreationally? (!) BATH SALTS        Social History     Tobacco Use    Smoking status: Former     Current packs/day: 0.00     Average packs/day: 0.1 packs/day for 2.6 years (0.3 ttl pk-yrs)     Types: Cigarettes     Start date: 10/1/1978     Quit date: 5/15/1981     Years since quittin.4     Passive exposure: Past    Smokeless tobacco: Never    Tobacco comments:     in 20's and 30's   Vaping Use    Vaping status: Never Used   Substance Use Topics    Alcohol use: Not Currently    Drug use: No           10/13/2023   LAST FHS-7 RESULTS   1st degree relative breast or ovarian cancer Yes   Any relative bilateral breast cancer No   Any male have breast cancer No   Any ONE woman have BOTH breast AND ovarian cancer No   Any woman  with breast cancer before 50yrs No   2 or more relatives with breast AND/OR ovarian cancer No   2 or more relatives with breast AND/OR bowel cancer No           Mammogram Screening - Mammogram every 1-2 years updated in Health Maintenance based on mutual decision making    ASCVD Risk   The 10-year ASCVD risk score (Chance IVERSON, et al., 2019) is: 8.2%    Values used to calculate the score:      Age: 74 years      Sex: Female      Is Non- : No      Diabetic: No      Tobacco smoker: No      Systolic Blood Pressure: 101 mmHg      Is BP treated: No      HDL Cholesterol: 70 mg/dL      Total Cholesterol: 195 mg/dL            Reviewed and updated as needed this visit by Provider                      Current providers sharing in care for this patient include:  Patient Care Team:  Alfreda Montgomery MD as PCP - General (Family Medicine)  Lizabeth Diez PsyD as Assigned Behavioral Health Provider  Heaven Hurst DNP as Assigned PCP    The following health maintenance items are reviewed in Epic and correct as of today:  Health Maintenance   Topic Date Due    Pneumococcal Vaccine: 65+ Years (1 of 2 - PCV) Never done    ZOSTER IMMUNIZATION (1 of 2) Never done    RSV VACCINE (1 - Risk 60-74 years 1-dose series) Never done    DEXA  01/21/2017    TSH W/FREE T4 REFLEX  08/18/2024    INFLUENZA VACCINE (1) 09/01/2024    COVID-19 Vaccine (8 - 2024-25 season) 09/01/2024    PHQ-9  04/21/2025    COLORECTAL CANCER SCREENING  08/11/2025    MAMMO SCREENING  10/13/2025    MEDICARE ANNUAL WELLNESS VISIT  10/21/2025    ANNUAL REVIEW OF HM ORDERS  10/21/2025    FALL RISK ASSESSMENT  10/21/2025    GLUCOSE  08/18/2026    LIPID  08/18/2028    ADVANCE CARE PLANNING  10/21/2029    DTAP/TDAP/TD IMMUNIZATION (3 - Td or Tdap) 02/03/2030    HEPATITIS C SCREENING  Completed    DEPRESSION ACTION PLAN  Completed    HPV IMMUNIZATION  Aged Out    MENINGITIS IMMUNIZATION  Aged Out    RSV MONOCLONAL ANTIBODY  Aged Out  "        Review of Systems  Constitutional, HEENT, cardiovascular, pulmonary, gi and gu systems are negative, except as otherwise noted.     Objective    Exam  /68   Pulse 72   Temp 96.8  F (36  C) (Temporal)   Resp 16   Ht 1.632 m (5' 4.25\")   Wt 70.7 kg (155 lb 14.4 oz)   LMP  (LMP Unknown)   SpO2 95%   BMI 26.55 kg/m     Estimated body mass index is 26.55 kg/m  as calculated from the following:    Height as of this encounter: 1.632 m (5' 4.25\").    Weight as of this encounter: 70.7 kg (155 lb 14.4 oz).    Physical Exam  GENERAL: alert and no distress  EYES: Eyes grossly normal to inspection, PERRL and conjunctivae and sclerae normal  HENT: ear canals and TM's normal, nose and mouth without ulcers or lesions  NECK: no adenopathy, no asymmetry, masses, or scars  RESP: lungs clear to auscultation - no rales, rhonchi or wheezes  BREAST: normal without masses, tenderness or nipple discharge and no palpable axillary masses or adenopathy  CV: regular rate and rhythm, normal S1 S2, no S3 or S4, no murmur, click or rub, no peripheral edema  ABDOMEN: soft, nontender, no hepatosplenomegaly, no masses and bowel sounds normal  MS: no gross musculoskeletal defects noted, no edema  SKIN: no suspicious lesions or rashes  NEURO: Normal strength and tone, mentation intact and speech normal  PSYCH: mentation appears normal, affect normal/bright         10/21/2024   Mini Cog   Clock Draw Score 2 Normal   3 Item Recall 3 objects recalled   Mini Cog Total Score 5                 Signed Electronically by: Alfreda Montgomery MD    Answers submitted by the patient for this visit:  Patient Health Questionnaire (Submitted on 10/21/2024)  If you checked off any problems, how difficult have these problems made it for you to do your work, take care of things at home, or get along with other people?: Somewhat difficult  PHQ9 TOTAL SCORE: 4    "

## 2024-10-21 NOTE — PATIENT INSTRUCTIONS
Vaccines to consider: Yearly flu vaccine, COVID-vaccine, shingles/zoster vaccine a total of 2, 6 months apart, pneumococcal 20 1 vaccine and RSV 1 vaccine      Patient Education   Preventive Care Advice   This is general advice given by our system to help you stay healthy. However, your care team may have specific advice just for you. Please talk to your care team about your preventive care needs.  Nutrition  Eat 5 or more servings of fruits and vegetables each day.  Try wheat bread, brown rice and whole grain pasta (instead of white bread, rice, and pasta).  Get enough calcium and vitamin D. Check the label on foods and aim for 100% of the RDA (recommended daily allowance).  Lifestyle  Exercise at least 150 minutes each week  (30 minutes a day, 5 days a week).  Do muscle strengthening activities 2 days a week. These help control your weight and prevent disease.  No smoking.  Wear sunscreen to prevent skin cancer.  Have a dental exam and cleaning every 6 months.  Yearly exams  See your health care team every year to talk about:  Any changes in your health.  Any medicines your care team has prescribed.  Preventive care, family planning, and ways to prevent chronic diseases.  Shots (vaccines)   HPV shots (up to age 26), if you've never had them before.  Hepatitis B shots (up to age 59), if you've never had them before.  COVID-19 shot: Get this shot when it's due.  Flu shot: Get a flu shot every year.  Tetanus shot: Get a tetanus shot every 10 years.  Pneumococcal, hepatitis A, and RSV shots: Ask your care team if you need these based on your risk.  Shingles shot (for age 50 and up)  General health tests  Diabetes screening:  Starting at age 35, Get screened for diabetes at least every 3 years.  If you are younger than age 35, ask your care team if you should be screened for diabetes.  Cholesterol test: At age 39, start having a cholesterol test every 5 years, or more often if advised.  Bone density scan (DEXA): At age  50, ask your care team if you should have this scan for osteoporosis (brittle bones).  Hepatitis C: Get tested at least once in your life.  STIs (sexually transmitted infections)  Before age 24: Ask your care team if you should be screened for STIs.  After age 24: Get screened for STIs if you're at risk. You are at risk for STIs (including HIV) if:  You are sexually active with more than one person.  You don't use condoms every time.  You or a partner was diagnosed with a sexually transmitted infection.  If you are at risk for HIV, ask about PrEP medicine to prevent HIV.  Get tested for HIV at least once in your life, whether you are at risk for HIV or not.  Cancer screening tests  Cervical cancer screening: If you have a cervix, begin getting regular cervical cancer screening tests starting at age 21.  Breast cancer scan (mammogram): If you've ever had breasts, begin having regular mammograms starting at age 40. This is a scan to check for breast cancer.  Colon cancer screening: It is important to start screening for colon cancer at age 45.  Have a colonoscopy test every 10 years (or more often if you're at risk) Or, ask your provider about stool tests like a FIT test every year or Cologuard test every 3 years.  To learn more about your testing options, visit:   .  For help making a decision, visit:   https://bit.ly/cp60512.  Prostate cancer screening test: If you have a prostate, ask your care team if a prostate cancer screening test (PSA) at age 55 is right for you.  Lung cancer screening: If you are a current or former smoker ages 50 to 80, ask your care team if ongoing lung cancer screenings are right for you.  For informational purposes only. Not to replace the advice of your health care provider. Copyright   2023 [employer]. All rights reserved. Clinically reviewed by the [facility] Transitions Program. [employer] [id number] - REV 01/24.  Preventing Falls: Care Instructions  Injuries and  health problems such as trouble walking or poor eyesight can increase your risk of falling. So can some medicines. But there are things you can do to help prevent falls. You can exercise to get stronger. You can also arrange your home to make it safer.    Talk to your doctor about the medicines you take. Ask if any of them increase the risk of falls and whether they can be changed or stopped.   Try to exercise regularly. It can help improve your strength and balance. This can help lower your risk of falling.         Practice fall safety and prevention.   Wear low-heeled shoes that fit well and give your feet good support. Talk to your doctor if you have foot problems that make this hard.  Carry a cellphone or wear a medical alert device that you can use to call for help.  Use stepladders instead of chairs to reach high objects. Don't climb if you're at risk for falls. Ask for help, if needed.  Wear the correct eyeglasses, if you need them.        Make your home safer.   Remove rugs, cords, clutter, and furniture from walkways.  Keep your house well lit. Use night-lights in hallways and bathrooms.  Install and use sturdy handrails on stairways.  Wear nonskid footwear, even inside. Don't walk barefoot or in socks without shoes.        Be safe outside.   Use handrails, curb cuts, and ramps whenever possible.  Keep your hands free by using a shoulder bag or backpack.  Try to walk in well-lit areas. Watch out for uneven ground, changes in pavement, and debris.  Be careful in the winter. Walk on the grass or gravel when sidewalks are slippery. Use de-icer on steps and walkways. Add non-slip devices to shoes.    Put grab bars and nonskid mats in your shower or tub and near the toilet. Try to use a shower chair or bath bench when bathing.   Get into a tub or shower by putting in your weaker leg first. Get out with your strong side first. Have a phone or medical alert device in the bathroom with you.   Where can you learn  "more?  Go to https://www.[employer].net/patiented  Enter G117 in the search box to learn more about \"Preventing Falls: Care Instructions.\"  Current as of: July 17, 2023  Content Version: 14.2 2024 [employer].   Care instructions adapted under license by your healthcare professional. If you have questions about a medical condition or this instruction, always ask your healthcare professional. Health[employer] Inc[employer] disclaims any warranty or liability for your use of this information.    Hearing Loss: Care Instructions  Overview     Hearing loss is a sudden or slow decrease in how well you hear. It can range from slight to profound. Permanent hearing loss can occur with aging. It also can happen when you are exposed long-term to loud noise. Examples include listening to loud music, riding motorcycles, or being around other loud machines.  Hearing loss can affect your work and home life. It can make you feel lonely or depressed. You may feel that you have lost your independence. But hearing aids and other devices can help you hear better and feel connected to others.  Follow-up care is a key part of your treatment and safety. Be sure to make and go to all appointments, and call your doctor if you are having problems. It's also a good idea to know your test results and keep a list of the medicines you take.  How can you care for yourself at home?  Avoid loud noises whenever possible. This helps keep your hearing from getting worse.  Always wear hearing protection around loud noises.  Wear a hearing aid as directed.  A professional can help you pick a hearing aid that will work best for you.  You can also get hearing aids over the counter for mild to moderate hearing loss.  Have hearing tests as your doctor suggests. They can show whether your hearing has changed. Your hearing aid may need to be adjusted.  Use other devices as needed. These may include:  Telephone amplifiers and hearing aids that can " "connect to a television, stereo, radio, or microphone.  Devices that use lights or vibrations. These alert you to the doorbell, a ringing telephone, or a baby monitor.  Television closed-captioning. This shows the words at the bottom of the screen. Most new TVs can do this.  TTY (text telephone). This lets you type messages back and forth on the telephone instead of talking or listening. These devices are also called TDD. When messages are typed on the keyboard, they are sent over the phone line to a receiving TTY. The message is shown on a monitor.  Use text messaging, social media, and email if it is hard for you to communicate by telephone.  Try to learn a listening technique called speechreading. It is not lipreading. You pay attention to people's gestures, expressions, posture, and tone of voice. These clues can help you understand what a person is saying. Face the person you are talking to, and have them face you. Make sure the lighting is good. You need to see the other person's face clearly.  Think about counseling if you need help to adjust to your hearing loss.  When should you call for help?  Watch closely for changes in your health, and be sure to contact your doctor if:    You think your hearing is getting worse.     You have new symptoms, such as dizziness or nausea.   Where can you learn more?  Go to https://www."[employer].net/patiented  Enter R798 in the search box to learn more about \"Hearing Loss: Care Instructions.\"  Current as of: September 27, 2023  Content Version: 14.2 2024 [facility] [employer].   Care instructions adapted under license by your healthcare professional. If you have questions about a medical condition or this instruction, always ask your healthcare professional. Healthwise, Incorporated disclaims any warranty or liability for your use of this information.    Learning About Stress  What is stress?     Stress is your body's response to a hard situation. Your body can have a " physical, emotional, or mental response. Stress is a fact of life for most people, and it affects everyone differently. What causes stress for you may not be stressful for someone else.  A lot of things can cause stress. You may feel stress when you go on a job interview, take a test, or run a race. This kind of short-term stress is normal and even useful. It can help you if you need to work hard or react quickly. For example, stress can help you finish an important job on time.  Long-term stress is caused by ongoing stressful situations or events. Examples of long-term stress include long-term health problems, ongoing problems at work, or conflicts in your family. Long-term stress can harm your health.  How does stress affect your health?  When you are stressed, your body responds as though you are in danger. It makes hormones that speed up your heart, make you breathe faster, and give you a burst of energy. This is called the fight-or-flight stress response. If the stress is over quickly, your body goes back to normal and no harm is done.  But if stress happens too often or lasts too long, it can have bad effects. Long-term stress can make you more likely to get sick, and it can make symptoms of some diseases worse. If you tense up when you are stressed, you may develop neck, shoulder, or low back pain. Stress is linked to high blood pressure and heart disease.  Stress also harms your emotional health. It can make you lynch, tense, or depressed. Your relationships may suffer, and you may not do well at work or school.  What can you do to manage stress?  You can try these things to help manage stress:   Do something active. Exercise or activity can help reduce stress. Walking is a great way to get started. Even everyday activities such as housecleaning or yard work can help.  Try yoga or steven chi. These techniques combine exercise and meditation. You may need some training at first to learn them.  Do something you  "enjoy. For example, listen to music or go to a movie. Practice your hobby or do volunteer work.  Meditate. This can help you relax, because you are not worrying about what happened before or what may happen in the future.  Do guided imagery. Imagine yourself in any setting that helps you feel calm. You can use online videos, books, or a teacher to guide you.  Do breathing exercises. For example:  From a standing position, bend forward from the waist with your knees slightly bent. Let your arms dangle close to the floor.  Breathe in slowly and deeply as you return to a standing position. Roll up slowly and lift your head last.  Hold your breath for just a few seconds in the standing position.  Breathe out slowly and bend forward from the waist.  Let your feelings out. Talk, laugh, cry, and express anger when you need to. Talking with supportive friends or family, a counselor, or a seth leader about your feelings is a healthy way to relieve stress. Avoid discussing your feelings with people who make you feel worse.  Write. It may help to write about things that are bothering you. This helps you find out how much stress you feel and what is causing it. When you know this, you can find better ways to cope.  What can you do to prevent stress?  You might try some of these things to help prevent stress:  Manage your time. This helps you find time to do the things you want and need to do.  Get enough sleep. Your body recovers from the stresses of the day while you are sleeping.  Get support. Your family, friends, and community can make a difference in how you experience stress.  Limit your news feed. Avoid or limit time on social media or news that may make you feel stressed.  Do something active. Exercise or activity can help reduce stress. Walking is a great way to get started.  Where can you learn more?  Go to https://www.healthwise.net/patiented  Enter N032 in the search box to learn more about \"Learning About " "Stress.\"  Current as of: October 24, 2023  Content Version: 14.2 2024 [facility] [employer].   Care instructions adapted under license by your healthcare professional. If you have questions about a medical condition or this instruction, always ask your healthcare professional. Healthwise, Incorporated disclaims any warranty or liability for your use of this information.    Learning About Sleeping Well  What does sleeping well mean?     Sleeping well means getting enough sleep to feel good and stay healthy. How much sleep is enough varies among people.  The number of hours you sleep and how you feel when you wake up are both important. If you do not feel refreshed, you probably need more sleep. Another sign of not getting enough sleep is feeling tired during the day.  Experts recommend that adults get at least 7 or more hours of sleep per day. Children and older adults need more sleep.  Why is getting enough sleep important?  Getting enough quality sleep is a basic part of good health. When your sleep suffers, your physical health, mood, and your thoughts can suffer too. You may find yourself feeling more grumpy or stressed. Not getting enough sleep also can lead to serious problems, including injury, accidents, anxiety, and depression.  What might cause poor sleeping?  Many things can cause sleep problems, including:  Changes to your sleep schedule.  Stress. Stress can be caused by fear about a single event, such as giving a speech. Or you may have ongoing stress, such as worry about work or school.  Depression, anxiety, and other mental or emotional conditions.  Changes in your sleep habits or surroundings. This includes changes that happen where you sleep, such as noise, light, or sleeping in a different bed. It also includes changes in your sleep pattern, such as having jet lag or working a late shift.  Health problems, such as pain, breathing problems, and restless legs syndrome.  Lack of regular " "exercise.  Using alcohol, nicotine, or caffeine before bed.  How can you help yourself?  Here are some tips that may help you sleep more soundly and wake up feeling more refreshed.  Your sleeping area   Use your bedroom only for sleeping and sex. A bit of light reading may help you fall asleep. But if it doesn't, do your reading elsewhere in the house. Try not to use your TV, computer, smartphone, or tablet while you are in bed.  Be sure your bed is big enough to stretch out comfortably, especially if you have a sleep partner.  Keep your bedroom quiet, dark, and cool. Use curtains, blinds, or a sleep mask to block out light. To block out noise, use earplugs, soothing music, or a \"white noise\" machine.  Your evening and bedtime routine   Create a relaxing bedtime routine. You might want to take a warm shower or bath, or listen to soothing music.  Go to bed at the same time every night. And get up at the same time every morning, even if you feel tired.  What to avoid   Limit caffeine (coffee, tea, caffeinated sodas) during the day, and don't have any for at least 6 hours before bedtime.  Avoid drinking alcohol before bedtime. Alcohol can cause you to wake up more often during the night.  Try not to smoke or use tobacco, especially in the evening. Nicotine can keep you awake.  Limit naps during the day, especially close to bedtime.  Avoid lying in bed awake for too long. If you can't fall asleep or if you wake up in the middle of the night and can't get back to sleep within about 20 minutes, get out of bed and go to another room until you feel sleepy.  Avoid taking medicine right before bed that may keep you awake or make you feel hyper or energized. Your doctor can tell you if your medicine may do this and if you can take it earlier in the day.  If you can't sleep   Imagine yourself in a peaceful, pleasant scene. Focus on the details and feelings of being in a place that is relaxing.  Get up and do a quiet or boring " "activity until you feel sleepy.  Avoid drinking any liquids before going to bed to help prevent waking up often to use the bathroom.  Where can you learn more?  Go to https://www.[employer].net/patiented  Enter J942 in the search box to learn more about \"Learning About Sleeping Well.\"  Current as of: July 10, 2023  Content Version: 14.2 2024 [facility].   Care instructions adapted under license by your healthcare professional. If you have questions about a medical condition or this instruction, always ask your healthcare professional. Healthwise, Incorporated disclaims any warranty or liability for your use of this information.    Bladder Training: Care Instructions  Your Care Instructions     Bladder training is used to treat urge incontinence and stress incontinence. Urge incontinence means that the need to urinate comes on so fast that you can't get to a toilet in time. Stress incontinence means that you leak urine because of pressure on your bladder. For example, it may happen when you laugh, cough, or lift something heavy.  Bladder training can increase how long you can wait before you have to urinate. It can also help your bladder hold more urine. And it can give you better control over the urge to urinate.  It is important to remember that bladder training takes a few weeks to a few months to make a difference. You may not see results right away, but don't give up.  Follow-up care is a key part of your treatment and safety. Be sure to make and go to all appointments, and call your doctor if you are having problems. It's also a good idea to know your test results and keep a list of the medicines you take.  How can you care for yourself at home?  Work with your doctor to come up with a bladder training program that is right for you. You may use one or more of the following methods.  Delayed urination  In the beginning, try to keep from urinating for 5 minutes after you first feel the need to " "go.  While you wait, take deep, slow breaths to relax. Kegel exercises can also help you delay the need to go to the bathroom.  After some practice, when you can easily wait 5 minutes to urinate, try to wait 10 minutes before you urinate.  Slowly increase the waiting period until you are able to control when you have to urinate.  Scheduled urination  Empty your bladder when you first wake up in the morning.  Schedule times throughout the day when you will urinate.  Start by going to the bathroom every hour, even if you don't need to go.  Slowly increase the time between trips to the bathroom.  When you have found a schedule that works well for you, keep doing it.  If you wake up during the night and have to urinate, do it. Apply your schedule to waking hours only.  Kegel exercises  These tighten and strengthen pelvic muscles, which can help you control the flow of urine. (If doing these exercises causes pain, stop doing them and talk with your doctor.) To do Kegel exercises:  Squeeze your muscles as if you were trying not to pass gas. Or squeeze your muscles as if you were stopping the flow of urine. Your belly, legs, and buttocks shouldn't move.  Hold the squeeze for 3 seconds, then relax for 5 to 10 seconds.  Start with 3 seconds, then add 1 second each week until you are able to squeeze for 10 seconds.  Repeat the exercise 10 times a session. Do 3 to 8 sessions a day.  When should you call for help?  Watch closely for changes in your health, and be sure to contact your doctor if:    Your incontinence is getting worse.     You do not get better as expected.   Where can you learn more?  Go to https://www.[employer].net/patiented  Enter V684 in the search box to learn more about \"Bladder Training: Care Instructions.\"  Current as of: November 15, 2023  Content Version: 14.2 2024 [employer].   Care instructions adapted under license by your healthcare professional. If you have questions about a medical " condition or this instruction, always ask your healthcare professional. Healthwise, [facility] disclaims any warranty or liability for your use of this information.    Substance Use Disorder: Care Instructions  Overview     You can improve your life and health by stopping your use of alcohol or drugs. When you don't drink or use drugs, you may feel and sleep better. You may get along better with your family, friends, and coworkers. There are medicines and programs that can help with substance use disorder.  How can you care for yourself at home?  Here are some ways to help you stay sober and prevent relapse.  If you have been given medicine to help keep you sober or reduce your cravings, be sure to take it exactly as prescribed.  Talk to your doctor about programs that can help you stop using drugs or drinking alcohol.  Do not keep alcohol or drugs in your home.  Plan ahead. Think about what you'll say if other people ask you to drink or use drugs. Try not to spend time with people who drink or use drugs.  Use the time and money spent on drinking or drugs to do something that's important to you.  Preventing a relapse  Have a plan to deal with relapse. Learn to recognize changes in your thinking that lead you to drink or use drugs. Get help before you start to drink or use drugs again.  Try to stay away from situations, friends, or places that may lead you to drink or use drugs.  If you feel the need to drink alcohol or use drugs again, seek help right away. Call a trusted friend or family member. Some people get support from organizations such as Narcotics Anonymous or [employer] or from treatment facilities.  If you relapse, get help as soon as you can. Some people make a plan with another person that outlines what they want that person to do for them if they relapse. The plan usually includes how to handle the relapse and who to notify in case of relapse.  Don't give up. Remember that a relapse doesn't mean  "that you have failed. Use the experience to learn the triggers that lead you to drink or use drugs. Then quit again. Recovery is a lifelong process. Many people have several relapses before they are able to quit for good.  Follow-up care is a key part of your treatment and safety. Be sure to make and go to all appointments, and call your doctor if you are having problems. It's also a good idea to know your test results and keep a list of the medicines you take.  When should you call for help?   Call 911  anytime you think you may need emergency care. For example, call if you or someone else:    Has overdosed or has withdrawal signs. Be sure to tell the emergency workers that you are or someone else is using or trying to quit using drugs. Overdose or withdrawal signs may include:  Losing consciousness.  Seizure.  Seeing or hearing things that aren't there (hallucinations).     Is thinking or talking about suicide or harming others.   Where to get help 24 hours a day, 7 days a week   If you or someone you know talks about suicide, self-harm, a mental health crisis, a substance use crisis, or any other kind of emotional distress, get help right away. You can:    Call the Suicide and Crisis Lifeline at 416.     Call 2-485-084-TALK (1-669.881.5673).     Text HOME to 451381 to access the Crisis Text Line.   Consider saving these numbers in your phone.  Go to [employer] for more information or to chat online.  Call your doctor now or seek immediate medical care if:    You are having withdrawal symptoms. These may include nausea or vomiting, sweating, shakiness, and anxiety.   Watch closely for changes in your health, and be sure to contact your doctor if:    You have a relapse.     You need more help or support to stop.   Where can you learn more?  Go to https://www.healthwise.net/patiented  Enter H573 in the search box to learn more about \"Substance Use Disorder: Care Instructions.\"  Current as of: November 15, " 2023  Content Version: 14.2 2024 [facility], [facility].   Care instructions adapted under license by your healthcare professional. If you have questions about a medical condition or this instruction, always ask your healthcare professional. Healthwise[employer] Inc[employer] disclaims any warranty or liability for your use of this information.

## 2024-10-22 ENCOUNTER — PATIENT OUTREACH (OUTPATIENT)
Dept: CARE COORDINATION | Facility: CLINIC | Age: 74
End: 2024-10-22
Payer: COMMERCIAL

## 2024-10-23 RX ORDER — PROPRANOLOL HCL 20 MG
40 TABLET ORAL DAILY
Qty: 90 TABLET | Refills: 3 | Status: SHIPPED | OUTPATIENT
Start: 2024-10-23

## 2024-10-23 NOTE — RESULT ENCOUNTER NOTE
Hello,    The vitamin D was a little bit elevated nothing too significant but feel free to reduce the dose of vitamin D that you take or perhaps space it out a little in the week and your number should flatten out and be just fine.    The comprehensive panel showed a tiny elevation of the blood sugar no concerns with this.  The urea nitrogen was also elevated and this went goes up sometimes if were dehydrated see if you can boost hydration.    The cholesterol was excellent the lousy LDL cholesterol was quite low at 114 much better than it had been 3 years ago.  Your good or healthy HDL cholesterol is 80 this shows exercise and activity as a result the total cholesterol is a little bit above 200 but it is largely because of that healthy HDL cholesterol.    The hemoglobin A1c is stable at 5.7 this still is in the prediabetic range but it has not risen higher.  Prediabetes is best managed with a low carbohydrate diet lots of exercise and keeping weight healthy.  Will continue to follow this yearly.    The rest of your labs were all normal.    Alfreda Montgomery MD

## 2024-10-25 ENCOUNTER — ANCILLARY PROCEDURE (OUTPATIENT)
Dept: MAMMOGRAPHY | Facility: CLINIC | Age: 74
End: 2024-10-25
Payer: COMMERCIAL

## 2024-10-25 DIAGNOSIS — Z12.31 VISIT FOR SCREENING MAMMOGRAM: ICD-10-CM

## 2024-10-25 PROCEDURE — 77063 BREAST TOMOSYNTHESIS BI: CPT | Mod: TC | Performed by: STUDENT IN AN ORGANIZED HEALTH CARE EDUCATION/TRAINING PROGRAM

## 2024-10-25 PROCEDURE — 77067 SCR MAMMO BI INCL CAD: CPT | Mod: TC | Performed by: STUDENT IN AN ORGANIZED HEALTH CARE EDUCATION/TRAINING PROGRAM

## 2024-11-07 ENCOUNTER — TRANSFERRED RECORDS (OUTPATIENT)
Dept: MULTI SPECIALTY CLINIC | Facility: CLINIC | Age: 74
End: 2024-11-07

## 2024-12-03 ENCOUNTER — TELEPHONE (OUTPATIENT)
Dept: FAMILY MEDICINE | Facility: CLINIC | Age: 74
End: 2024-12-03
Payer: COMMERCIAL

## 2024-12-03 NOTE — TELEPHONE ENCOUNTER
Test Results        Who ordered the test:  Alfreda Montgomery    Type of test: Lab and Bone Density Scan    Date of test:  11/7/24    Where was the test performed:  [facility]     What are your questions/concerns?:  Would like to go over results.     Could we send this information to you in [employer] or would you prefer to receive a phone call?:   Patient would like to be contacted via [employer]

## 2024-12-04 NOTE — TELEPHONE ENCOUNTER
Dr. Gant,    Please see below tele message and advise.   Pt requesting your interpretation of DEXA scan.  Outside DEXA results found in [facility] dated 11/07/24:     DXA RESULTS:  -Lumbar Spine: L3-L4: BMD: 1.236 g/cm2. T-score: 0.3. Z-score: 1.8. Degenerative change may artifactually increase BMD.  -RIGHT Hip Total: BMD: 0.823 g/cm2. T-score: -1.5. Z-score: 0.0.  -RIGHT Hip Femoral neck: BMD: 805 g/cm2. T-score: -1.7. Z-score: 0.0.  -LEFT Hip Total: BMD: 0.802 g/cm2. T-score: -1.6. Z-score: -0.1.  -LEFT Hip Femoral neck: BMD: 0.795 g/cm2. T-score: -1.7. Z-score: 0.0.     INTERVAL CHANGE  -There has been a 5.1% increase in lumbar spine BMD. Degenerative changes of the lumbar spine may artifactually increase BMD.  -There has been a 1.9% decrease in the right hip BMD.  -There has been a 6.5% decrease in the left hip BMD.     IMPRESSION:  Low bone density (OSTEOPENIA). T score meets the WHO criteria for low bone density (osteopenia) at one or more measured sites. The risk of osteoporotic fracture increases approximately two-fold for each standard deviation decrease in T-score.     Thanks,   Yanet BAHENA RN

## 2024-12-09 NOTE — TELEPHONE ENCOUNTER
Called pt  Relayed message from MD (see below)  Pt expressed understanding.  Pt requested to schedule an appt to discuss further.   Scheduled pt for appt in 2 weeks.   All questions were answered.     Yanet BAHENA RN

## 2024-12-09 NOTE — TELEPHONE ENCOUNTER
Her dexa showed osteopenia or thinning of the bones in the lumbar spine and the hips.    There was an increase in density of her lumbar spine however they also noted that this can be confounded by degenerative joint diseasein the spine which they did see.  This can falsely elevated the reading.    Both hips did seem to get thinner when compared to past dexa scans    I advise ensuring she has 1200mg calcium per day   1000 international unit(s) vit D per day  7000-10,000 steps per day or some weight bearing exercise.  She is eligible for a medication to slow bone thinning called bisphosphonates.  If she is interested in this or if she wouls like to talk more we can set up a VV to go over this    Thank

## 2024-12-18 ENCOUNTER — VIRTUAL VISIT (OUTPATIENT)
Dept: FAMILY MEDICINE | Facility: CLINIC | Age: 74
End: 2024-12-18
Payer: COMMERCIAL

## 2024-12-18 DIAGNOSIS — M85.852 OSTEOPENIA OF BOTH HIPS: Primary | ICD-10-CM

## 2024-12-18 DIAGNOSIS — M85.851 OSTEOPENIA OF BOTH HIPS: Primary | ICD-10-CM

## 2024-12-18 PROCEDURE — 99214 OFFICE O/P EST MOD 30 MIN: CPT | Mod: 95 | Performed by: FAMILY MEDICINE

## 2024-12-18 NOTE — PROGRESS NOTES
"Adalgisa is a 74 year old who is being evaluated via a billable video visit.    How would you like to obtain your AVS? MyChart  If the video visit is dropped, the invitation should be resent by:   Will anyone else be joining your video visit? No      Assessment & Plan     (M85.851,  M85.852) Osteopenia of both hips  (primary encounter diagnosis)  Comment: REVIEWED guidelines and walking weight bearing calcium and vit D advised  Plan: repeat dexa in 3 years      Reviewed her medications - all meds were sent over in October 2024 to [facility]    BMI  Estimated body mass index is 26.55 kg/m  as calculated from the following:    Height as of 10/21/24: 1.632 m (5' 4.25\").    Weight as of 10/21/24: 70.7 kg (155 lb 14.4 oz).             Subjective   Adalgisa is a 74 year old, presenting for the following health issues:  RECHECK        12/18/2024     3:45 PM   Additional Questions   Roomed by CHAR SOUTH   Accompanied by JIMENA         12/18/2024     3:45 PM   Patient Reported Additional Medications   Patient reports taking the following new medications N/A       Via the Health Maintenance questionnaire, the patient has reported the following services have been completed DEXA: annika 2024-10-01, this information has been sent to the abstraction team.      History of Present Illness       Reason for visit:  Learn about bone density results    She eats 2-3 servings of fruits and vegetables daily.She consumes 0 sweetened beverage(s) daily.She exercises with enough effort to increase her heart rate 10 to 19 minutes per day.  She exercises with enough effort to increase her heart rate 4 days per week.   She is taking medications regularly.     Reviewed her bone density from [facility]:  DXA RESULTS   -Lumbar Spine: L3-L4: BMD: 1.236 g/cm2. T-score: 0.3. Z-score: 1.8. Degenerative change may artifactually increase BMD.   -RIGHT Hip Total: BMD: 0.823 g/cm2. T-score: -1.5. Z-score: 0.0.   -RIGHT Hip Femoral neck: BMD: 805 " g/cm2. T-score: -1.7. Z-score: 0.0.   -LEFT Hip Total: BMD: 0.802 g/cm2. T-score: -1.6. Z-score: -0.1.   -LEFT Hip Femoral neck: BMD: 0.795 g/cm2. T-score: -1.7. Z-score: 0.0.     WHO T-SCORE CRITERIA   -Normal: T score at or above -1 SD   -Osteopenia: T score between -1 and -2.5 SD   -Osteoporosis: T score at or below -2.5 SD     The World Health Organization (WHO) criteria is applicable to perimenopausal females, postmenopausal females, and men aged 50 years or older.     INTERVAL CHANGE   -There has been a 5.1% increase in lumbar spine BMD. Degenerative changes of the lumbar spine may artifactually increase BMD.   -There has been a 1.9% decrease in the right hip BMD.   -There has been a 6.5% decrease in the left hip BMD.     FRACTURE RISK   -FRAX Results: The 10 year probability of major osteoporotic fracture is 11.8%, and of hip fracture is 2.5%, based on left femoral neck BMD.           Review of Systems  Constitutional, HEENT, cardiovascular, pulmonary, gi and gu systems are negative, except as otherwise noted.      Objective           Vitals:  No vitals were obtained today due to virtual visit.    Physical Exam   GENERAL: alert and no distress  EYES: Eyes grossly normal to inspection.  No discharge or erythema, or obvious scleral/conjunctival abnormalities.  RESP: No audible wheeze, cough, or visible cyanosis.    SKIN: Visible skin clear. No significant rash, abnormal pigmentation or lesions.  NEURO: Cranial nerves grossly intact.  Mentation and speech appropriate for age.  PSYCH: Appropriate affect, tone, and pace of words     DXA RESULTS   -Lumbar Spine: L3-L4: BMD: 1.236 g/cm2. T-score: 0.3. Z-score: 1.8. Degenerative change may artifactually increase BMD.   -RIGHT Hip Total: BMD: 0.823 g/cm2. T-score: -1.5. Z-score: 0.0.   -RIGHT Hip Femoral neck: BMD: 805 g/cm2. T-score: -1.7. Z-score: 0.0.   -LEFT Hip Total: BMD: 0.802 g/cm2. T-score: -1.6. Z-score: -0.1.   -LEFT Hip Femoral neck: BMD: 0.795 g/cm2.  T-score: -1.7. Z-score: 0.0.     WHO T-SCORE CRITERIA   -Normal: T score at or above -1 SD   -Osteopenia: T score between -1 and -2.5 SD   -Osteoporosis: T score at or below -2.5 SD     The World Health Organization (WHO) criteria is applicable to perimenopausal females, postmenopausal females, and men aged 50 years or older.     INTERVAL CHANGE   -There has been a 5.1% increase in lumbar spine BMD. Degenerative changes of the lumbar spine may artifactually increase BMD.   -There has been a 1.9% decrease in the right hip BMD.   -There has been a 6.5% decrease in the left hip BMD.     FRACTURE RISK   -FRAX Results: The 10 year probability of major osteoporotic fracture is 11.8%, and of hip fracture is 2.5%, based on left femoral neck BMD.         TELEPHONE VISIT: 16 MINUTES      Originating Location (pt. Location): Home    Distant Location (provider location):    Platform used for Video Visit:   Signed Electronically by: Alfreda Montgomery MD

## 2025-01-13 ENCOUNTER — MYC MEDICAL ADVICE (OUTPATIENT)
Dept: FAMILY MEDICINE | Facility: CLINIC | Age: 75
End: 2025-01-13
Payer: COMMERCIAL

## 2025-01-13 DIAGNOSIS — R53.83 FATIGUE, UNSPECIFIED TYPE: Primary | ICD-10-CM

## 2025-01-13 NOTE — TELEPHONE ENCOUNTER
Dr. Gant,    Please see below Dreamise message and advise.  Lab pended, if approved.     Thanks,   Yanet BAHENA RN

## 2025-01-22 ENCOUNTER — ALLIED HEALTH/NURSE VISIT (OUTPATIENT)
Dept: FAMILY MEDICINE | Facility: CLINIC | Age: 75
End: 2025-01-22
Payer: COMMERCIAL

## 2025-01-22 ENCOUNTER — LAB (OUTPATIENT)
Dept: LAB | Facility: CLINIC | Age: 75
End: 2025-01-22
Payer: COMMERCIAL

## 2025-01-22 ENCOUNTER — TELEPHONE (OUTPATIENT)
Dept: FAMILY MEDICINE | Facility: CLINIC | Age: 75
End: 2025-01-22

## 2025-01-22 DIAGNOSIS — F32.A DEPRESSION: Primary | ICD-10-CM

## 2025-01-22 DIAGNOSIS — R53.83 FATIGUE, UNSPECIFIED TYPE: ICD-10-CM

## 2025-01-22 PROCEDURE — 36415 COLL VENOUS BLD VENIPUNCTURE: CPT

## 2025-01-22 PROCEDURE — 84443 ASSAY THYROID STIM HORMONE: CPT

## 2025-01-22 PROCEDURE — 84439 ASSAY OF FREE THYROXINE: CPT

## 2025-01-22 PROCEDURE — 84481 FREE ASSAY (FT-3): CPT

## 2025-01-22 PROCEDURE — 99207 PR NO CHARGE NURSE ONLY: CPT

## 2025-01-22 NOTE — NURSING NOTE
See phone message of 1/22/2025 which has been sent to PCP Dr Esquivel   Pt in lab for blood draw today and anxious about results    Karen GAN RN, BSN  [facility]

## 2025-01-22 NOTE — TELEPHONE ENCOUNTER
S-(situation): pt here at [place] for lab draw only and wanted to talk to nurse  Feels extremely depressed,  (denies suicidal ideation.)  Fatigue and irritability - the depression hit the last 3 days  Hard to do ADLs but she is managing, wondering what happens next ,If it is not the thyroid she doesn't know what it is  Appetite ok  Waking up last 10 days at 4 am and hard to go back to sleep, worse than usual - this started after Luca HAMILTON-(background): on Dec 10 went from 137 to 125 mcg of levothryoxine  In the past she has felt this way when thyroid off  Per [facility] lab, TSH should be resulted tomorrow late morning    Will let DR Montgomery know so this can be factored in with her results tomorrow and pt will be waiting to hear back from primary clinic tomorrow

## 2025-01-22 NOTE — TELEPHONE ENCOUNTER
Dr. Gant,    Are you okay with a DB? Any particular time?    You next available/open slot for VV is Thursday 1/30/25    Thanks,   Yanet BAHENA RN

## 2025-01-23 ENCOUNTER — VIRTUAL VISIT (OUTPATIENT)
Dept: FAMILY MEDICINE | Facility: CLINIC | Age: 75
End: 2025-01-23
Payer: COMMERCIAL

## 2025-01-23 DIAGNOSIS — F33.8 SEASONAL AFFECTIVE DISORDER: ICD-10-CM

## 2025-01-23 DIAGNOSIS — F34.1 DYSTHYMIA: Primary | ICD-10-CM

## 2025-01-23 LAB
T3FREE SERPL-MCNC: 2.9 PG/ML (ref 2–4.4)
T4 FREE SERPL-MCNC: 1.34 NG/DL (ref 0.9–1.7)
TSH SERPL DL<=0.005 MIU/L-ACNC: 0.65 UIU/ML (ref 0.3–4.2)

## 2025-01-23 ASSESSMENT — PATIENT HEALTH QUESTIONNAIRE - PHQ9: SUM OF ALL RESPONSES TO PHQ QUESTIONS 1-9: 5

## 2025-01-23 NOTE — PROGRESS NOTES
"Adalgisa is a 74 year old who is being evaluated via a billable telephone visit.    What phone number would you like to be contacted at? [phone]  How would you like to obtain your AVS? Javon  Originating Location (pt. Location): Home    Distant Location (provider location):  On-site  Telephone visit completed due to the patient did not consent to a video visit.    Assessment & Plan     (F34.1) Dysthymia  (primary encounter diagnosis)  Comment: Discussed options for managing this.  Reviewed talk therapy boosting exercise and the use of a light box also discussed possibility of medications she has not had good response to medications and does not is interested in this.  Plan: Encouraged her to let me know if there is any thoughts of self-harm or worsening feelings.  Patient does feel that boosting exercise and talk therapy will be helpful and will consider the light box.    (F33.8) Seasonal affective disorder  Comment: As noted above  Plan:           BMI  Estimated body mass index is 26.55 kg/m  as calculated from the following:    Height as of 10/21/24: 1.632 m (5' 4.25\").    Weight as of 10/21/24: 70.7 kg (155 lb 14.4 oz).         Follow-up with me in the next few weeks if she feels this is not working or as needed    Subjective   Adalgisa is a 74 year old, presenting for the following health issues:  Depression        1/23/2025     2:21 PM   Additional Questions   Roomed by Chey DORSEY   Since Sunday noted fatigue irritability and tearfulness  Not sure if this is the cold and isolation  Has had deaths in the family    For the past 2 years has not been able to follow her spiritual practice  Has not been able to handle antidepressants    Started 5HTP this started abut few days ago    Seeing spiritual practitioner  Sees a therapist last week    Has thought about a light box    Has thought abut boosting exercise  Currently does see a  and does pilates    No thoughts of hurting herself just dark " thoughts      Depression   How are you doing with your depression since your last visit? Worsened    Are you having other symptoms that might be associated with depression? No  Have you had a significant life event?  No   Are you feeling anxious or having panic attacks?   No  Do you have any concerns with your use of alcohol or other drugs? No    Social History     Tobacco Use    Smoking status: Former     Current packs/day: 0.00     Average packs/day: 0.1 packs/day for 2.6 years (0.3 ttl pk-yrs)     Types: Cigarettes     Start date: 10/1/1978     Quit date: 5/15/1981     Years since quittin.7     Passive exposure: Past    Smokeless tobacco: Never    Tobacco comments:     in 20's and 30's   Vaping Use    Vaping status: Never Used   Substance Use Topics    Alcohol use: Not Currently    Drug use: No         4/15/2024     1:05 PM 10/21/2024    12:32 PM 2025     2:18 PM   PHQ   PHQ-9 Total Score 4 4 5   Q9: Thoughts of better off dead/self-harm past 2 weeks Not at all Not at all  Not at all       Proxy-reported         2016     2:15 PM 4/15/2024     1:06 PM   KARINA-7 SCORE   Total Score  2 (minimal anxiety)   Total Score 3 2         2025     2:18 PM   Last PHQ-9   1.  Little interest or pleasure in doing things 2   2.  Feeling down, depressed, or hopeless 1   3.  Trouble falling or staying asleep, or sleeping too much 1   4.  Feeling tired or having little energy 1   5.  Poor appetite or overeating 0   6.  Feeling bad about yourself 0   7.  Trouble concentrating 0   8.  Moving slowly or restless 0   Q9: Thoughts of better off dead/self-harm past 2 weeks 0   PHQ-9 Total Score 5   Difficulty at work, home, or with people Somewhat difficult         4/15/2024     1:06 PM   KARINA-7    1. Feeling nervous, anxious, or on edge 0   2. Not being able to stop or control worrying 1   3. Worrying too much about different things 0   4. Trouble relaxing 1   5. Being so restless that it is hard to sit still 0   6.  Becoming easily annoyed or irritable 0   7. Feeling afraid, as if something awful might happen 0   KARINA-7 Total Score 2   If you checked any problems, how difficult have they made it for you to do your work, take care of things at home, or get along with other people? Somewhat difficult       Suicide Assessment Five-step Evaluation and Treatment (SAFE-T)            Review of Systems  Constitutional, HEENT, cardiovascular, pulmonary, gi and gu systems are negative, except as otherwise noted.      Objective           Vitals:  No vitals were obtained today due to virtual visit.    Physical Exam   General: Alert and no distress //Respiratory: No audible wheeze, cough, or shortness of breath // Psychiatric:  Appropriate affect, tone, and pace of words    Recent labs do show normal thyroid function normal vitamin D no anemia        Phone call duration: 16 minutes  Signed Electronically by: Alfreda Montgomery MD

## 2025-01-28 ENCOUNTER — TELEPHONE (OUTPATIENT)
Dept: FAMILY MEDICINE | Facility: CLINIC | Age: 75
End: 2025-01-28

## 2025-01-28 ENCOUNTER — E-VISIT (OUTPATIENT)
Dept: FAMILY MEDICINE | Facility: CLINIC | Age: 75
End: 2025-01-28
Payer: COMMERCIAL

## 2025-01-28 DIAGNOSIS — G89.29 CHRONIC SHOULDER PAIN, UNSPECIFIED LATERALITY: Primary | ICD-10-CM

## 2025-01-28 DIAGNOSIS — M25.519 CHRONIC SHOULDER PAIN, UNSPECIFIED LATERALITY: Primary | ICD-10-CM

## 2025-01-28 NOTE — TELEPHONE ENCOUNTER
Order/Referral Request    Who is requesting: Patient    Orders being requested: Referral for PT to be sent to [facility] for shoulder pain (f:[phone]    Reason service is needed/diagnosis: Shoulder pain    When are orders needed by: ASAP    Has this been discussed with Provider: N/A    Does patient have a preference on a Group/Provider/Facility? N/A    Does patient have an appointment scheduled?: No    Where to send orders: Fax    Could we send this information to you in [place] or would you prefer to receive a phone call?:   Patient would prefer a phone call   Okay to leave a detailed message?: Yes at Cell number on file:    Telephone Information:   Mobile [phone]

## 2025-01-28 NOTE — TELEPHONE ENCOUNTER
Spoke to patient  Advised e-visit for topic not previously discussed  Patient verbalized understanding and agreed with plan of care  Kari Mao RN

## 2025-01-29 ENCOUNTER — E-VISIT (OUTPATIENT)
Dept: FAMILY MEDICINE | Facility: CLINIC | Age: 75
End: 2025-01-29
Payer: COMMERCIAL

## 2025-01-29 DIAGNOSIS — Z86.59 HISTORY OF EATING DISORDER: Primary | ICD-10-CM

## 2025-01-29 PROCEDURE — 99207 PR NON-BILLABLE SERV PER CHARTING: CPT | Performed by: FAMILY MEDICINE

## 2025-01-30 NOTE — TELEPHONE ENCOUNTER
Provider E-Visit time total (minutes): 0    Patient wanted a referral for nutritionist but we discovered it is not covered by insurance  Ill have her reach out to them to see who is covered for her history of eating disorder    Alfreda Montgomery MD

## 2025-01-30 NOTE — TELEPHONE ENCOUNTER
Provider E-Visit time total (minutes): 10    Please fax referral to :  [facility] fax number [phone]

## 2025-02-13 ENCOUNTER — DOCUMENTATION ONLY (OUTPATIENT)
Dept: OTHER | Facility: CLINIC | Age: 75
End: 2025-02-13
Payer: COMMERCIAL

## 2025-03-04 DIAGNOSIS — E03.9 ACQUIRED HYPOTHYROIDISM: ICD-10-CM

## 2025-03-04 RX ORDER — LEVOTHYROXINE SODIUM 125 UG/1
125 TABLET ORAL DAILY
Qty: 90 TABLET | Refills: 1 | Status: SHIPPED | OUTPATIENT
Start: 2025-03-04

## 2025-04-10 ENCOUNTER — DOCUMENTATION ONLY (OUTPATIENT)
Dept: OTHER | Facility: CLINIC | Age: 75
End: 2025-04-10
Payer: COMMERCIAL

## 2025-05-03 ENCOUNTER — MYC REFILL (OUTPATIENT)
Dept: FAMILY MEDICINE | Facility: CLINIC | Age: 75
End: 2025-05-03
Payer: COMMERCIAL

## 2025-05-03 DIAGNOSIS — F40.243 FLYING PHOBIA: ICD-10-CM

## 2025-05-05 RX ORDER — LORAZEPAM 0.5 MG/1
TABLET ORAL
Qty: 15 TABLET | Refills: 0 | Status: SHIPPED | OUTPATIENT
Start: 2025-05-05

## 2025-05-07 ENCOUNTER — VIRTUAL VISIT (OUTPATIENT)
Dept: FAMILY MEDICINE | Facility: OTHER | Age: 75
End: 2025-05-07
Payer: COMMERCIAL

## 2025-05-07 ENCOUNTER — MYC MEDICAL ADVICE (OUTPATIENT)
Dept: FAMILY MEDICINE | Facility: CLINIC | Age: 75
End: 2025-05-07
Payer: COMMERCIAL

## 2025-05-07 DIAGNOSIS — F51.04 PSYCHOPHYSIOLOGICAL INSOMNIA: Primary | ICD-10-CM

## 2025-05-07 PROCEDURE — 98014 SYNCH AUDIO-ONLY EST MOD 30: CPT | Performed by: PHYSICIAN ASSISTANT

## 2025-05-07 PROCEDURE — 1126F AMNT PAIN NOTED NONE PRSNT: CPT | Mod: 93 | Performed by: PHYSICIAN ASSISTANT

## 2025-05-07 RX ORDER — TRAZODONE HYDROCHLORIDE 50 MG/1
50-100 TABLET ORAL AT BEDTIME
Qty: 60 TABLET | Refills: 1 | Status: SHIPPED | OUTPATIENT
Start: 2025-05-07

## 2025-05-07 ASSESSMENT — ANXIETY QUESTIONNAIRES
IF YOU CHECKED OFF ANY PROBLEMS ON THIS QUESTIONNAIRE, HOW DIFFICULT HAVE THESE PROBLEMS MADE IT FOR YOU TO DO YOUR WORK, TAKE CARE OF THINGS AT HOME, OR GET ALONG WITH OTHER PEOPLE: NOT DIFFICULT AT ALL
6. BECOMING EASILY ANNOYED OR IRRITABLE: MORE THAN HALF THE DAYS
1. FEELING NERVOUS, ANXIOUS, OR ON EDGE: NOT AT ALL
3. WORRYING TOO MUCH ABOUT DIFFERENT THINGS: SEVERAL DAYS
2. NOT BEING ABLE TO STOP OR CONTROL WORRYING: SEVERAL DAYS
7. FEELING AFRAID AS IF SOMETHING AWFUL MIGHT HAPPEN: NOT AT ALL
GAD7 TOTAL SCORE: 5
GAD7 TOTAL SCORE: 5
5. BEING SO RESTLESS THAT IT IS HARD TO SIT STILL: NOT AT ALL

## 2025-05-07 ASSESSMENT — PATIENT HEALTH QUESTIONNAIRE - PHQ9
5. POOR APPETITE OR OVEREATING: SEVERAL DAYS
SUM OF ALL RESPONSES TO PHQ QUESTIONS 1-9: 5

## 2025-05-07 NOTE — PROGRESS NOTES
Adalgisa is a 75 year old who is being evaluated via a billable telephone visit.    What phone number would you like to be contacted at? [phone]  How would you like to obtain your AVS? Javon  Originating Location (pt. Location): Home  Distant Location (provider location):  Off-site  Telephone visit completed due to the patient did not have access to video, while the distant provider did.    Assessment & Plan     ICD-10-CM    1. Psychophysiological insomnia  F51.04 traZODone (DESYREL) 50 MG tablet        - Patient with on and off history of sleep issues   - Recently having back to back flares due to illnesses   - Was on Lorazepam and Ambien in the past     Lorazepam not helping     Been a long time since on Ambien  - Discussed trial of Hydroxyzine or Trazodone   - Reviewed both use and side effects     Patient elects to try Trazodone   - Recheck 2-4 weeks with provider or PCP     The patient indicates understanding of these issues and agrees with the plan.    Follow up: 2-4 weeks     Reynaldo Guzmán-MAGO Brown  [facility]   Adalgisa is a 75 year old, presenting for the following health issues:  Sleep Problem      5/7/2025    11:49 AM   Additional Questions   Roomed by Liz         5/7/2025    11:49 AM   Patient Reported Additional Medications   Patient reports taking the following new medications lauric select, magnesium     History of Present Illness       Reason for visit:  Insomnia       7  Insomnia  Onset/Duration: about 3 weeks  Description:   Frequency of insomnia:  several times a week  Time to fall asleep (sleep latency): 2 hours  Middle of night awakening:  YES  Early morning awakening:  YES  Progression of Symptoms:  intermittent  Accompanying Signs & Symptoms:  Daytime sleepiness/napping: YES  Excessive snoring/apnea: YES- snoring  Restless legs: No  Waking to urinate: YES- sometimes  Chronic pain:  YES- on and off in shoulder but states it is  "manageagle  Depression symptoms (if yes, do PHQ9): YES  Anxiety symptoms (if yes, do KARINA-7): YES  History:  Prior Insomnia: No  New stressful situation: No  Precipitating factors:   Caffeine intake: almost none  OTC decongestants: YES- benadryl  Any new medications: YES- almaz select but states she ha taken this before  Alleviating factors:  Self medicating (alcohol, etc.):  No  Stress-reduction (exercise, yoga, meditation etc): YES  Therapies tried and outcome: lorazepam -  makes her feel \"doped up\"     - Tried OTC but didn't help   - Back to back boughts lately      Worsening when has virus issues   - Did have Ambien in the past           Review of Systems  Constitutional, neuro, ENT, endocrine, pulmonary, cardiac, gastrointestinal, genitourinary, musculoskeletal, integument and psychiatric systems are negative, except as otherwise noted.      Objective    Vitals - Patient Reported  Pain Score: No Pain (0)    Physical Exam   General: Alert and no distress //Respiratory: No audible wheeze, cough, or shortness of breath // Psychiatric:  Appropriate affect, tone, and pace of words      Diagnostics: none       Phone call duration: 7 minutes and 30 seconds   Signed Electronically by: Tabitha Ulloa PA-C    "

## 2025-05-08 ENCOUNTER — DOCUMENTATION ONLY (OUTPATIENT)
Dept: OTHER | Facility: CLINIC | Age: 75
End: 2025-05-08
Payer: COMMERCIAL

## 2025-05-16 ENCOUNTER — RESULTS FOLLOW-UP (OUTPATIENT)
Dept: URGENT CARE | Facility: URGENT CARE | Age: 75
End: 2025-05-16

## 2025-06-10 ENCOUNTER — TRANSFERRED RECORDS (OUTPATIENT)
Dept: ADMINISTRATIVE | Facility: CLINIC | Age: 75
End: 2025-06-10
Payer: COMMERCIAL

## 2025-06-11 NOTE — PROCEDURES
[facility]   [address] [place] [address], [address], [place] [address]     Patient Name: Adalgisa Dennis  Gender:  Female  Exam Date: 06/10/2025 Visit Number:  [id number]  Age: 75 Years YOB: 1950  Attending MD: Derik Sevilla MD Medical Record#:  [id number]  -----------------------------------------------------------------------------------------------------------------------------   Procedure:    Upper GI Endoscopy   Indications:    Bloating   History of h pylori infection  Provider:        Derik Sevilla MD   Referring MD: Referral Self   Primary MD:      Alfreda Montgomery MD  Medications:   Admitting Medication:   0.9% Normal Saline 500cc 150cc/hour   Intra Procedure Medications:   Patient received monitored anesthesia care.     Complications: No immediate complications  ___________________________________________________________________________________________  Procedure:   An examination of the heart and lungs was performed within acceptable limits.  . The patient was therefore deemed a reasonable candidate for sedation.   The risks and benefits were explained to the patient, who appeared to understand. After obtaining informed consent, the scope was passed under direct vision. Throughout the procedure the patient's blood pressure, pulse and oxygen saturations were monitored.  The scope was introduced through the mouth and advanced to the second portion of duodenum.         Findings:   Esophagus:    Normal esophagus.   The z-line is 40 centimeters from the incisors.  Top of the gastric folds is 40 centimeters from the incisors.  Stomach:    Normal stomach.    H. Pylori biopsies taken.   The diaphragm hiatus is at 40 centimeters from the incisors.  Duodenum:    Normal duodenum.    Celiac Sprue biopsies taken.  Celiac Sprue biopsies taken.  Impression:   Bloating  History of Helicobacter pylori infection    Preliminary Plan:  Antiplatelets/Anticoagulants: Apixaban (Eliquis). Last  dose: Today.   Restart. Date: 06/11/2025  Recommendation Comments:  Follow-up biopsy results.  Will forward results to your clinic provider for review and next steps.  Pathology Results:  A: DUODENUM, BIOPSY:           1. Normal duodenal mucosa           2. Negative for celiac disease and other enteropathy      B: STOMACH, BIOPSY:           1. Normal gastric body mucosa           2. Negative for Helicobacter      MICROSCOPIC  A: Performed   B: Performed     Electronically signed by: Silas Fernandez MD    Interpreted at [facility], [address] [place] [id number]      _Electronically signed by:___________________  Derik Sevilla MD                 06/10/2025    cc: Alfreda Montgomery MD

## 2025-07-05 ENCOUNTER — OFFICE VISIT (OUTPATIENT)
Dept: URGENT CARE | Facility: URGENT CARE | Age: 75
End: 2025-07-05
Payer: COMMERCIAL

## 2025-07-05 VITALS
RESPIRATION RATE: 12 BRPM | OXYGEN SATURATION: 98 % | BODY MASS INDEX: 26.23 KG/M2 | TEMPERATURE: 98.4 F | DIASTOLIC BLOOD PRESSURE: 58 MMHG | WEIGHT: 154 LBS | SYSTOLIC BLOOD PRESSURE: 84 MMHG | HEART RATE: 74 BPM

## 2025-07-05 DIAGNOSIS — Z86.19 HISTORY OF LYME DISEASE: Primary | ICD-10-CM

## 2025-07-05 DIAGNOSIS — L98.9 SKIN LESION: ICD-10-CM
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
## 2025-07-05 DIAGNOSIS — S70.361A INSECT BITE OF RIGHT THIGH, INITIAL ENCOUNTER: ICD-10-CM

## 2025-07-05 PROCEDURE — 3078F DIAST BP <80 MM HG: CPT | Performed by: INTERNAL MEDICINE

## 2025-07-05 PROCEDURE — 3074F SYST BP LT 130 MM HG: CPT | Performed by: INTERNAL MEDICINE

## 2025-07-05 PROCEDURE — 99213 OFFICE O/P EST LOW 20 MIN: CPT | Performed by: INTERNAL MEDICINE

## 2025-07-05 RX ORDER — DOXYCYCLINE 100 MG/1
200 CAPSULE ORAL DAILY
Qty: 2 CAPSULE | Refills: 0 | Status: SHIPPED | OUTPATIENT
Start: 2025-07-05 | End: 2025-07-06

## 2025-07-05 NOTE — PROGRESS NOTES
ASSESSMENT AND PLAN:      ICD-10-CM    1. History of Lyme disease  Z86.19 doxycycline hyclate (VIBRAMYCIN) 100 MG capsule      2. Insect bite of right thigh, initial encounter  S70.361A doxycycline hyclate (VIBRAMYCIN) 100 MG capsule    W57.XXXA       3. Skin lesion  L98.9 doxycycline hyclate (VIBRAMYCIN) 100 MG capsule        With patient's concern of tick bite and her Lyme's disease history she was given doxycycline prophylaxis 200 mg once.  Discussed with her under magnified light I did not see retained tick body/head  Recommended observation and not trying to touch the area as looks irritated.  Potentially it was initially an underlying mole similar to other ones on leg.    Recheck with further concerns of the skin lesion      Sanna Magana MD  [facility]    Subjective     Adalgisa Dennis is a 75 year old who presents for Patient presents with:  Insect Bites: X this morning  Reports did not see tick on there   Right thigh possible tick bite   Site is itchy   Cortisone cream used     an established patient of [facility].    Bug bite    Patient noticed insect bite this morning on her right outer thigh.  Slightly itchy.    She did not see a tick but is concerned about a possible tick bite.    Wonders if retained tick.  She thinks she saw some legs in the lesion.  The area is raised and not smooth  Treatment has included hydrocortisone cream to area      Context: at [facility]  Significant past medical history: hx lymes for 6 years          Review of Systems        Objective    BP (!) 84/58   Pulse 74   Temp 98.4  F (36.9  C) (Temporal)   Resp 12   Wt 69.9 kg (154 lb)   LMP  (LMP Unknown)   SpO2 98%   BMI 26.23 kg/m    Physical Exam  Vitals reviewed.   Constitutional:       Appearance: Normal appearance. She is not ill-appearing.   Skin:     Comments: Examination of area of concern on right outer thigh.  Visualized area under magnified light  No retained tick  noted.  Some discoloration brown noted.  Also 2 black hairs visualized coming out of the lesion    Discussed with patient unclear whether she did have a bite here versus irritated skin mole   Neurological:      Mental Status: She is alert.

## 2025-07-07 ENCOUNTER — TRANSFERRED RECORDS (OUTPATIENT)
Dept: HEALTH INFORMATION MANAGEMENT | Facility: CLINIC | Age: 75
End: 2025-07-07

## 2025-08-06 ENCOUNTER — NURSE TRIAGE (OUTPATIENT)
Dept: FAMILY MEDICINE | Facility: CLINIC | Age: 75
End: 2025-08-06
Payer: COMMERCIAL

## 2025-08-09 DIAGNOSIS — E03.9 ACQUIRED HYPOTHYROIDISM: ICD-10-CM

## 2025-08-11 RX ORDER — LEVOTHYROXINE SODIUM 125 UG/1
125 TABLET ORAL DAILY
Qty: 90 TABLET | Refills: 1 | Status: SHIPPED | OUTPATIENT
Start: 2025-08-11

## 2025-08-26 ENCOUNTER — TELEPHONE (OUTPATIENT)
Dept: FAMILY MEDICINE | Facility: CLINIC | Age: 75
End: 2025-08-26
Payer: COMMERCIAL

## (undated) RX ORDER — FENTANYL CITRATE 50 UG/ML
INJECTION, SOLUTION INTRAMUSCULAR; INTRAVENOUS
Status: DISPENSED
Start: 2020-08-11